# Patient Record
Sex: FEMALE | Race: BLACK OR AFRICAN AMERICAN | NOT HISPANIC OR LATINO | Employment: OTHER | URBAN - NONMETROPOLITAN AREA
[De-identification: names, ages, dates, MRNs, and addresses within clinical notes are randomized per-mention and may not be internally consistent; named-entity substitution may affect disease eponyms.]

---

## 2017-04-19 ENCOUNTER — OFFICE VISIT (OUTPATIENT)
Dept: ORTHOPEDIC SURGERY | Facility: CLINIC | Age: 67
End: 2017-04-19

## 2017-04-19 ENCOUNTER — PREP FOR SURGERY (OUTPATIENT)
Dept: ORTHOPEDIC SURGERY | Facility: CLINIC | Age: 67
End: 2017-04-19

## 2017-04-19 ENCOUNTER — APPOINTMENT (OUTPATIENT)
Dept: PREADMISSION TESTING | Facility: HOSPITAL | Age: 67
End: 2017-04-19

## 2017-04-19 VITALS
BODY MASS INDEX: 32.82 KG/M2 | DIASTOLIC BLOOD PRESSURE: 85 MMHG | SYSTOLIC BLOOD PRESSURE: 127 MMHG | HEART RATE: 86 BPM | WEIGHT: 197 LBS | HEIGHT: 65 IN

## 2017-04-19 DIAGNOSIS — S42.352A CLOSED DISPLACED COMMINUTED FRACTURE OF SHAFT OF LEFT HUMERUS, INITIAL ENCOUNTER: ICD-10-CM

## 2017-04-19 DIAGNOSIS — S42.352A CLOSED DISPLACED COMMINUTED FRACTURE OF SHAFT OF LEFT HUMERUS, INITIAL ENCOUNTER: Primary | ICD-10-CM

## 2017-04-19 PROBLEM — I10 HYPERTENSION: Status: ACTIVE | Noted: 2017-04-19

## 2017-04-19 PROBLEM — M19.90 ARTHRITIS: Status: ACTIVE | Noted: 2017-04-19

## 2017-04-19 PROBLEM — E11.9 DIABETES MELLITUS (HCC): Status: ACTIVE | Noted: 2017-04-19

## 2017-04-19 PROBLEM — M54.50 LOW BACK PAIN: Status: ACTIVE | Noted: 2017-04-19

## 2017-04-19 PROBLEM — E78.00 HIGH CHOLESTEROL: Status: ACTIVE | Noted: 2017-04-19

## 2017-04-19 LAB
ANION GAP SERPL CALCULATED.3IONS-SCNC: 6.8 MMOL/L (ref 3.6–11.2)
BASOPHILS # BLD AUTO: 0.05 10*3/MM3 (ref 0–0.3)
BASOPHILS NFR BLD AUTO: 0.6 % (ref 0–2)
BUN BLD-MCNC: 13 MG/DL (ref 7–21)
BUN/CREAT SERPL: 11.6 (ref 7–25)
CALCIUM SPEC-SCNC: 10.1 MG/DL (ref 7.7–10)
CHLORIDE SERPL-SCNC: 105 MMOL/L (ref 99–112)
CO2 SERPL-SCNC: 24.2 MMOL/L (ref 24.3–31.9)
CREAT BLD-MCNC: 1.12 MG/DL (ref 0.43–1.29)
DEPRECATED RDW RBC AUTO: 50.5 FL (ref 37–54)
EOSINOPHIL # BLD AUTO: 0.02 10*3/MM3 (ref 0–0.7)
EOSINOPHIL NFR BLD AUTO: 0.2 % (ref 0–7)
ERYTHROCYTE [DISTWIDTH] IN BLOOD BY AUTOMATED COUNT: 13.6 % (ref 11.5–14.5)
GFR SERPL CREATININE-BSD FRML MDRD: 59 ML/MIN/1.73
GLUCOSE BLD-MCNC: 150 MG/DL (ref 70–110)
HCT VFR BLD AUTO: 36 % (ref 37–47)
HGB BLD-MCNC: 11.3 G/DL (ref 12–16)
IMM GRANULOCYTES # BLD: 0.03 10*3/MM3 (ref 0–0.03)
IMM GRANULOCYTES NFR BLD: 0.4 % (ref 0–0.5)
LYMPHOCYTES # BLD AUTO: 1.23 10*3/MM3 (ref 1–3)
LYMPHOCYTES NFR BLD AUTO: 14.4 % (ref 16–46)
MCH RBC QN AUTO: 32.7 PG (ref 27–33)
MCHC RBC AUTO-ENTMCNC: 31.4 G/DL (ref 33–37)
MCV RBC AUTO: 104 FL (ref 80–94)
MONOCYTES # BLD AUTO: 0.82 10*3/MM3 (ref 0.1–0.9)
MONOCYTES NFR BLD AUTO: 9.6 % (ref 0–12)
NEUTROPHILS # BLD AUTO: 6.4 10*3/MM3 (ref 1.4–6.5)
NEUTROPHILS NFR BLD AUTO: 74.8 % (ref 40–75)
OSMOLALITY SERPL CALC.SUM OF ELEC: 274.9 MOSM/KG (ref 273–305)
PLATELET # BLD AUTO: 219 10*3/MM3 (ref 130–400)
PMV BLD AUTO: 10.8 FL (ref 6–10)
POTASSIUM BLD-SCNC: 4 MMOL/L (ref 3.5–5.3)
RBC # BLD AUTO: 3.46 10*6/MM3 (ref 4.2–5.4)
SODIUM BLD-SCNC: 136 MMOL/L (ref 135–153)
WBC NRBC COR # BLD: 8.55 10*3/MM3 (ref 4.5–12.5)

## 2017-04-19 PROCEDURE — 93005 ELECTROCARDIOGRAM TRACING: CPT | Performed by: ORTHOPAEDIC SURGERY

## 2017-04-19 PROCEDURE — 99203 OFFICE O/P NEW LOW 30 MIN: CPT | Performed by: ORTHOPAEDIC SURGERY

## 2017-04-19 PROCEDURE — 93010 ELECTROCARDIOGRAM REPORT: CPT | Performed by: INTERNAL MEDICINE

## 2017-04-19 PROCEDURE — 80048 BASIC METABOLIC PNL TOTAL CA: CPT

## 2017-04-19 PROCEDURE — 85025 COMPLETE CBC W/AUTO DIFF WBC: CPT

## 2017-04-19 PROCEDURE — 36415 COLL VENOUS BLD VENIPUNCTURE: CPT

## 2017-04-19 RX ORDER — SODIUM CHLORIDE 0.9 % (FLUSH) 0.9 %
1-10 SYRINGE (ML) INJECTION AS NEEDED
Status: CANCELLED | OUTPATIENT
Start: 2017-04-19

## 2017-04-19 RX ORDER — SODIUM CHLORIDE, SODIUM LACTATE, POTASSIUM CHLORIDE, CALCIUM CHLORIDE 600; 310; 30; 20 MG/100ML; MG/100ML; MG/100ML; MG/100ML
100 INJECTION, SOLUTION INTRAVENOUS CONTINUOUS
Status: CANCELLED | OUTPATIENT
Start: 2017-04-19

## 2017-04-19 NOTE — PROGRESS NOTES
History and Physical    Patient: Nancy Berman  YOB: 1950  Date of encounter: 04/19/2017      History of Present Illness:   Nancy Berman is a 67 y.o. female who is referred here today by [facility] emergency room for evaluation of acute injury to her left shoulder.  She states yesterday she fell at home landing on her left shoulder.  She denies any associated syncope, chest pain, or palpitations with the fall.  She states she simply lost her footing and fell.  Immediately after the fall she had significant pain and proceeded to the emergency room.  X-rays were taken while at ER then she was placed in a sling.  She denies paresthesias to the hand.    PMH:   Patient Active Problem List   Diagnosis   • Hypertension   • High cholesterol   • Diabetes mellitus   • Low back pain   • Arthritis     Past Medical History:   Diagnosis Date   • Arthritis    • Cancer     left Kidney   • Diabetes mellitus    • High cholesterol    • Hypertension    • Low back pain          PSH:  Past Surgical History:   Procedure Laterality Date   • BLADDER SURGERY     • HYSTERECTOMY     • KIDNEY SURGERY Left     Nephrectomy for cancerous mass       Allergies:     Allergies   Allergen Reactions   • Sulfa Antibiotics        Medications:     Current Outpatient Prescriptions:   •  acetaminophen (TYLENOL) 500 MG tablet, Take 500 mg by mouth every 6 (six) hours as needed for mild pain (1-3)., Disp: , Rfl:   •  amLODIPine (NORVASC) 10 MG tablet, Take 10 mg by mouth daily., Disp: , Rfl:   •  aspirin 325 MG tablet, Take 325 mg by mouth daily., Disp: , Rfl:   •  diclofenac (VOLTAREN) 1 % gel gel, Apply 4 g topically 4 (four) times a day as needed., Disp: , Rfl:   •  gabapentin (NEURONTIN) 600 MG tablet, Take 600 mg by mouth 3 (three) times a day., Disp: , Rfl:   •  lisinopril (PRINIVIL,ZESTRIL) 20 MG tablet, Take 20 mg by mouth daily., Disp: , Rfl:   •  metFORMIN XR (GLUCOPHAGE-XR) 500 MG 24 hr tablet, Take 500 mg by mouth daily  "with breakfast., Disp: , Rfl:   •  pioglitazone (ACTOS) 45 MG tablet, Take 45 mg by mouth daily., Disp: , Rfl:     Social History:     Social History     Occupational History   • Not on file.     Social History Main Topics   • Smoking status: Former Smoker   • Smokeless tobacco: Never Used   • Alcohol use Yes      Comment: social   • Drug use: Defer   • Sexual activity: Defer      Social History     Social History Narrative       Family History:     Family History   Problem Relation Age of Onset   • Parkinsonism Mother    • Heart disease Father    • Diabetes Father    • Cancer Brother        Review of Systems:   Review of Systems   Constitutional: Negative.    HENT: Negative.    Eyes: Negative.    Respiratory: Negative.    Cardiovascular: Negative.    Gastrointestinal: Negative.    Genitourinary: Negative.    Musculoskeletal:        Pertinent positives in HPI    Skin: Negative.    Neurological: Negative.    Hematological: Negative.    Psychiatric/Behavioral: Negative.        Physical Exam:   General Appearance:    67 y.o. female  cooperative, in no acute distress.  Alert and oriented x 3,                   Vitals:    04/19/17 0953   BP: 127/85   Pulse: 86   Weight: 197 lb (89.4 kg)   Height: 65\" (165.1 cm)          HEENT: Unremarkable      Neck: Supple without lymphadenopathy.      Chest: Clear to ascultation bilaterally. Normal respiratory effort.      Heart: Regular rate and rhythm. No murmurs noted.      Skin:  Warm and dry without any rash.      Musculoskeletal:     Upper Extremities: Examination reveals her left shoulder in a sling.  She does have moderate swelling and ecchymosis with tenderness along the proximal humeral shaft.  Her range of motion is not tested secondary to known fracture.  Her neurovascular      status is intact.    Lower Extremities: Unremarkable.       Radiology:     AP and lateral views of the shoulder and humerus were reviewed revealing a completely displaced comminuted proximal humeral " shaft fracture    Assessment    ICD-10-CM ICD-9-CM   1. Closed displaced comminuted fracture of shaft of left humerus, initial encounter S42.352A 812.21       Plan:  A 67-year-old female with acute injury to the left humerus.  X-rays were reviewed today revealing a comminuted displaced proximal humeral shaft fracture.  We discussed options including surgical intervention versus conservative treatment.  After a lengthy discussion with her and her daughter's she decided to proceed with surgical intervention with IM rodding of the left humerus.  We discussed the risks, benefits, and future outcomes of surgery.  She accepts these risks and is agreeable to surgery.  We will place her on the OR schedule for tomorrow April 20, 2017.    Written by, Miya LUCAS, acting as a scribe for Dr. Montse WINTER, Jose Willard MD, personally performed the services described in this documentation as scribed by the above named individual in my presence, and it is both accurate and complete.  4/19/2017  12:42 PM     Cc: ANGELA Clark    This document was signed by Jose Willard M.D.  April 19, 2017 12:43 PM

## 2017-04-19 NOTE — DISCHARGE INSTRUCTIONS
Call Dr. Willard's office for surgery arrival time -- surgery date is 4/20/17    TAKE the following medications the morning of surgery:  All heart or blood pressure medications    HOLD all diabetic medications the morning of surgery as ordered by physician.    Please discontinue all blood thinners and anticoagulants (except aspirin) prior to surgery as per your surgeon and cardiologist instructions.  Aspirin may be continued up to the day prior to surgery.     General Instructions:  · Do not eat or drink after midnight: includes water, mints, or gum. You may brush your teeth.  Dental appliances that are removable must be taken out day of surgery.  · Do not smoke, chew tobacco, or drink alcohol.  · Bring medications in original bottles, any inhalers and if applicable your C-PAP/BI-PAP machine.  · Bring any papers given to you in the doctor's office.  · Wear clean comfortable clothes and socks.  · Do not wear contact lenses or make-up. Bring a case for your glasses if applicable.  · Bring crutches or walker if applicable.  · Leave all other valuables and jewelry at home.    If you were given a blood bank ID arm band remember to bring it with you the day of surgery.    Preventing a Surgical Site Infection:  Shower on the morning of surgery using a fresh bar of anti-bacterial soap (such as Dial) and clean washcloth. Dry with a clean towel and dress in clean clothing.  For 2 to 3 days before surgery, avoid shaving with a razor near where you will have surgery because the razor can irritate skin and make it easier to develop an infection. Ask your surgeon if you will be receiving antibiotics prior to surgery.  Make sure you, your family, and all healthcare providers clear their hands with soap and water or an alcohol-based hand  before caring for you or your wound.  If at all possible, quit smoking as many days before surgery as you can.    Day of surgery:  Upon arrival, a Pre-op nurse and Anesthesiologist will  review your health history, obtain vital signs, and answer questions you may have. The only belongings needed at this time will be your home medications and if applicable your C-PAP/BI-PAP machine. If you are staying overnight your family can leave the rest of your belongings in the car and bring them to your room later. A Pre-op nurse will start an IV and you may receive medication in preparation for surgery, including something to help you relax. Your family will be able to see you in the Pre-op area. While you are in surgery your family should notify the waiting room  if they leave the waiting room area and provide a contact phone number.    Please be aware that surgery does come with discomfort. We want to make every effort to control your discomfort so please discuss any uncontrolled symptoms with your nurse. Your doctor will most likely have prescribed pain medications.    If you are going home after surgery you will receive individualized written care instructions before being discharged. A responsible adult must drive you to and from the hospital on the day of surgery and stay with you for 24 hours.    If you are staying overnight following surgery, you will be transported to your hospital room following the recovery period.  [facility] has all private rooms.    If you have any questions please call Pre-Admission Testing at [phone].  Deductibles and co-payments are collected on the day of service. Please be prepared to pay the required co-pay, deductible or deposit on the day of service as defined by your plan.

## 2017-04-19 NOTE — H&P
History and Physical    Patient: Nancy Berman  YOB: 1950  Date of encounter: 04/19/2017      History of Present Illness:   Nancy Berman is a 67 y.o. female who is referred here today by [facility] emergency room for evaluation of acute injury to her left shoulder.  She states yesterday she fell at home landing on her left shoulder.  She denies any associated syncope, chest pain, or palpitations with the fall.  She states she simply lost her footing and fell.  Immediately after the fall she had significant pain and proceeded to the emergency room.  X-rays were taken while at ER then she was placed in a sling.  She denies paresthesias to the hand.    PMH:   Patient Active Problem List   Diagnosis   • Hypertension   • High cholesterol   • Diabetes mellitus   • Low back pain   • Arthritis     Past Medical History:   Diagnosis Date   • Arthritis    • Cancer     left Kidney   • Diabetes mellitus    • High cholesterol    • Hypertension    • Low back pain          PSH:  Past Surgical History:   Procedure Laterality Date   • BLADDER SURGERY     • HYSTERECTOMY     • KIDNEY SURGERY Left     Nephrectomy for cancerous mass       Allergies:     Allergies   Allergen Reactions   • Sulfa Antibiotics        Medications:     Current Outpatient Prescriptions:   •  acetaminophen (TYLENOL) 500 MG tablet, Take 500 mg by mouth every 6 (six) hours as needed for mild pain (1-3)., Disp: , Rfl:   •  amLODIPine (NORVASC) 10 MG tablet, Take 10 mg by mouth daily., Disp: , Rfl:   •  aspirin 325 MG tablet, Take 325 mg by mouth daily., Disp: , Rfl:   •  diclofenac (VOLTAREN) 1 % gel gel, Apply 4 g topically 4 (four) times a day as needed., Disp: , Rfl:   •  gabapentin (NEURONTIN) 600 MG tablet, Take 600 mg by mouth 3 (three) times a day., Disp: , Rfl:   •  lisinopril (PRINIVIL,ZESTRIL) 20 MG tablet, Take 20 mg by mouth daily., Disp: , Rfl:   •  metFORMIN XR (GLUCOPHAGE-XR) 500 MG 24 hr tablet, Take 500 mg by mouth daily  "with breakfast., Disp: , Rfl:   •  pioglitazone (ACTOS) 45 MG tablet, Take 45 mg by mouth daily., Disp: , Rfl:     Social History:     Social History     Occupational History   • Not on file.     Social History Main Topics   • Smoking status: Former Smoker   • Smokeless tobacco: Never Used   • Alcohol use Yes      Comment: social   • Drug use: Defer   • Sexual activity: Defer      Social History     Social History Narrative       Family History:     Family History   Problem Relation Age of Onset   • Parkinsonism Mother    • Heart disease Father    • Diabetes Father    • Cancer Brother        Review of Systems:   Review of Systems   Constitutional: Negative.    HENT: Negative.    Eyes: Negative.    Respiratory: Negative.    Cardiovascular: Negative.    Gastrointestinal: Negative.    Genitourinary: Negative.    Musculoskeletal:        Pertinent positives in HPI    Skin: Negative.    Neurological: Negative.    Hematological: Negative.    Psychiatric/Behavioral: Negative.        Physical Exam:   General Appearance:    67 y.o. female  cooperative, in no acute distress.  Alert and oriented x 3,                   Vitals:    04/19/17 0953   BP: 127/85   Pulse: 86   Weight: 197 lb (89.4 kg)   Height: 65\" (165.1 cm)          HEENT: Unremarkable      Neck: Supple without lymphadenopathy.      Chest: Clear to ascultation bilaterally. Normal respiratory effort.      Heart: Regular rate and rhythm. No murmurs noted.      Skin:  Warm and dry without any rash.      Musculoskeletal:     Upper Extremities: Examination reveals her left shoulder in a sling.  She does have moderate swelling and ecchymosis with tenderness along the proximal humeral shaft.  Her range of motion is not tested secondary to known fracture.  Her neurovascular      status is intact.    Lower Extremities: Unremarkable.       Radiology:     AP and lateral views of the shoulder and humerus were reviewed revealing a completely displaced comminuted proximal humeral " shaft fracture    Assessment    ICD-10-CM ICD-9-CM   1. Closed displaced comminuted fracture of shaft of left humerus, initial encounter S42.352A 812.21       Plan:  A 67-year-old female with acute injury to the left humerus.  X-rays were reviewed today revealing a comminuted displaced proximal humeral shaft fracture.  We discussed options including surgical intervention versus conservative treatment.  After a lengthy discussion with her and her daughter's she decided to proceed with surgical intervention with IM rodding of the left humerus.  We discussed the risks, benefits, and future outcomes of surgery.  She accepts these risks and is agreeable to surgery.  We will place her on the OR schedule for tomorrow April 20, 2017.    Written by, Miya LUCAS, acting as a scribe for Dr. Montse WINTER, Jose Willard MD, personally performed the services described in this documentation as scribed by the above named individual in my presence, and it is both accurate and complete.  4/19/2017  12:42 PM     Cc: ANGELA Clark    This document was signed by Jose Willard M.D.  April 19, 2017 12:43 PM

## 2017-04-20 ENCOUNTER — ANESTHESIA (OUTPATIENT)
Dept: PERIOP | Facility: HOSPITAL | Age: 67
End: 2017-04-20

## 2017-04-20 ENCOUNTER — APPOINTMENT (OUTPATIENT)
Dept: GENERAL RADIOLOGY | Facility: HOSPITAL | Age: 67
End: 2017-04-20

## 2017-04-20 ENCOUNTER — HOSPITAL ENCOUNTER (OUTPATIENT)
Facility: HOSPITAL | Age: 67
Discharge: HOME | End: 2017-04-22
Attending: ORTHOPAEDIC SURGERY | Admitting: ORTHOPAEDIC SURGERY

## 2017-04-20 ENCOUNTER — ANESTHESIA EVENT (OUTPATIENT)
Dept: PERIOP | Facility: HOSPITAL | Age: 67
End: 2017-04-20

## 2017-04-20 DIAGNOSIS — S42.352A CLOSED DISPLACED COMMINUTED FRACTURE OF SHAFT OF LEFT HUMERUS, INITIAL ENCOUNTER: ICD-10-CM

## 2017-04-20 LAB
GLUCOSE BLDC GLUCOMTR-MCNC: 156 MG/DL (ref 70–130)
GLUCOSE BLDC GLUCOMTR-MCNC: 160 MG/DL (ref 70–130)

## 2017-04-20 PROCEDURE — 94799 UNLISTED PULMONARY SVC/PX: CPT

## 2017-04-20 PROCEDURE — 24516 TX HUMRL SHAFT FX IMED IMPLT: CPT | Performed by: ORTHOPAEDIC SURGERY

## 2017-04-20 PROCEDURE — 25010000002 FENTANYL CITRATE (PF) 100 MCG/2ML SOLUTION: Performed by: NURSE ANESTHETIST, CERTIFIED REGISTERED

## 2017-04-20 PROCEDURE — 76000 FLUOROSCOPY <1 HR PHYS/QHP: CPT | Performed by: RADIOLOGY

## 2017-04-20 PROCEDURE — G0378 HOSPITAL OBSERVATION PER HR: HCPCS

## 2017-04-20 PROCEDURE — 25010000002 HYDROMORPHONE PER 4 MG: Performed by: NURSE ANESTHETIST, CERTIFIED REGISTERED

## 2017-04-20 PROCEDURE — C1713 ANCHOR/SCREW BN/BN,TIS/BN: HCPCS | Performed by: ORTHOPAEDIC SURGERY

## 2017-04-20 PROCEDURE — 25010000003 CEFAZOLIN PER 500 MG: Performed by: ORTHOPAEDIC SURGERY

## 2017-04-20 PROCEDURE — 73060 X-RAY EXAM OF HUMERUS: CPT | Performed by: RADIOLOGY

## 2017-04-20 PROCEDURE — 25010000002 ONDANSETRON PER 1 MG: Performed by: NURSE ANESTHETIST, CERTIFIED REGISTERED

## 2017-04-20 PROCEDURE — 73060 X-RAY EXAM OF HUMERUS: CPT

## 2017-04-20 PROCEDURE — 25010000002 HYDROMORPHONE PER 4 MG: Performed by: ORTHOPAEDIC SURGERY

## 2017-04-20 PROCEDURE — 76000 FLUOROSCOPY <1 HR PHYS/QHP: CPT

## 2017-04-20 PROCEDURE — 25010000002 HYDROMORPHONE PER 4 MG: Performed by: ANESTHESIOLOGY

## 2017-04-20 PROCEDURE — 82962 GLUCOSE BLOOD TEST: CPT

## 2017-04-20 PROCEDURE — 25010000002 PROPOFOL 10 MG/ML EMULSION: Performed by: NURSE ANESTHETIST, CERTIFIED REGISTERED

## 2017-04-20 PROCEDURE — 25010000002 MIDAZOLAM PER 1 MG: Performed by: NURSE ANESTHETIST, CERTIFIED REGISTERED

## 2017-04-20 DEVICE — IMPLANTABLE DEVICE: Type: IMPLANTABLE DEVICE | Site: HUMERUS | Status: FUNCTIONAL

## 2017-04-20 DEVICE — SCRW LK STRDRV TI 4X30MM: Type: IMPLANTABLE DEVICE | Site: HUMERUS | Status: FUNCTIONAL

## 2017-04-20 RX ORDER — PROPOFOL 10 MG/ML
VIAL (ML) INTRAVENOUS AS NEEDED
Status: DISCONTINUED | OUTPATIENT
Start: 2017-04-20 | End: 2017-04-20 | Stop reason: SURG

## 2017-04-20 RX ORDER — PIOGLITAZONEHYDROCHLORIDE 15 MG/1
45 TABLET ORAL DAILY
Status: DISCONTINUED | OUTPATIENT
Start: 2017-04-20 | End: 2017-04-21

## 2017-04-20 RX ORDER — SODIUM CHLORIDE, SODIUM LACTATE, POTASSIUM CHLORIDE, CALCIUM CHLORIDE 600; 310; 30; 20 MG/100ML; MG/100ML; MG/100ML; MG/100ML
100 INJECTION, SOLUTION INTRAVENOUS CONTINUOUS
Status: DISCONTINUED | OUTPATIENT
Start: 2017-04-20 | End: 2017-04-22 | Stop reason: HOSPADM

## 2017-04-20 RX ORDER — SODIUM CHLORIDE 0.9 % (FLUSH) 0.9 %
1-10 SYRINGE (ML) INJECTION AS NEEDED
Status: DISCONTINUED | OUTPATIENT
Start: 2017-04-20 | End: 2017-04-20 | Stop reason: HOSPADM

## 2017-04-20 RX ORDER — HYDROCODONE BITARTRATE AND ACETAMINOPHEN 7.5; 325 MG/1; MG/1
1 TABLET ORAL EVERY 6 HOURS PRN
COMMUNITY
End: 2017-05-10 | Stop reason: SDUPTHER

## 2017-04-20 RX ORDER — SODIUM CHLORIDE 0.9 % (FLUSH) 0.9 %
1-10 SYRINGE (ML) INJECTION AS NEEDED
Status: DISCONTINUED | OUTPATIENT
Start: 2017-04-20 | End: 2017-04-22 | Stop reason: HOSPADM

## 2017-04-20 RX ORDER — MEPERIDINE HYDROCHLORIDE 25 MG/ML
12.5 INJECTION INTRAMUSCULAR; INTRAVENOUS; SUBCUTANEOUS
Status: DISCONTINUED | OUTPATIENT
Start: 2017-04-20 | End: 2017-04-20 | Stop reason: HOSPADM

## 2017-04-20 RX ORDER — HYDROCODONE BITARTRATE AND ACETAMINOPHEN 7.5; 325 MG/1; MG/1
1 TABLET ORAL EVERY 6 HOURS PRN
Status: DISCONTINUED | OUTPATIENT
Start: 2017-04-20 | End: 2017-04-21

## 2017-04-20 RX ORDER — ONDANSETRON 2 MG/ML
4 INJECTION INTRAMUSCULAR; INTRAVENOUS ONCE AS NEEDED
Status: DISCONTINUED | OUTPATIENT
Start: 2017-04-20 | End: 2017-04-20 | Stop reason: HOSPADM

## 2017-04-20 RX ORDER — IPRATROPIUM BROMIDE AND ALBUTEROL SULFATE 2.5; .5 MG/3ML; MG/3ML
3 SOLUTION RESPIRATORY (INHALATION) ONCE AS NEEDED
Status: DISCONTINUED | OUTPATIENT
Start: 2017-04-20 | End: 2017-04-20 | Stop reason: HOSPADM

## 2017-04-20 RX ORDER — FAMOTIDINE 10 MG/ML
INJECTION, SOLUTION INTRAVENOUS AS NEEDED
Status: DISCONTINUED | OUTPATIENT
Start: 2017-04-20 | End: 2017-04-20 | Stop reason: SURG

## 2017-04-20 RX ORDER — ASPIRIN 325 MG
325 TABLET ORAL DAILY
Status: DISCONTINUED | OUTPATIENT
Start: 2017-04-20 | End: 2017-04-22 | Stop reason: HOSPADM

## 2017-04-20 RX ORDER — ACETAMINOPHEN 325 MG/1
500 TABLET ORAL EVERY 6 HOURS PRN
Status: DISCONTINUED | OUTPATIENT
Start: 2017-04-20 | End: 2017-04-22 | Stop reason: HOSPADM

## 2017-04-20 RX ORDER — SODIUM CHLORIDE, SODIUM LACTATE, POTASSIUM CHLORIDE, CALCIUM CHLORIDE 600; 310; 30; 20 MG/100ML; MG/100ML; MG/100ML; MG/100ML
125 INJECTION, SOLUTION INTRAVENOUS CONTINUOUS
Status: DISCONTINUED | OUTPATIENT
Start: 2017-04-20 | End: 2017-04-21

## 2017-04-20 RX ORDER — NALOXONE HCL 0.4 MG/ML
0.4 VIAL (ML) INJECTION
Status: DISCONTINUED | OUTPATIENT
Start: 2017-04-20 | End: 2017-04-22 | Stop reason: HOSPADM

## 2017-04-20 RX ORDER — AMLODIPINE BESYLATE 10 MG/1
10 TABLET ORAL DAILY
Status: DISCONTINUED | OUTPATIENT
Start: 2017-04-20 | End: 2017-04-21

## 2017-04-20 RX ORDER — BUPIVACAINE HYDROCHLORIDE 5 MG/ML
INJECTION, SOLUTION PERINEURAL AS NEEDED
Status: DISCONTINUED | OUTPATIENT
Start: 2017-04-20 | End: 2017-04-20 | Stop reason: HOSPADM

## 2017-04-20 RX ORDER — ACETAMINOPHEN 325 MG/1
1000 TABLET ORAL ONCE
Status: DISCONTINUED | OUTPATIENT
Start: 2017-04-20 | End: 2017-04-20 | Stop reason: HOSPADM

## 2017-04-20 RX ORDER — FENTANYL CITRATE 50 UG/ML
INJECTION, SOLUTION INTRAMUSCULAR; INTRAVENOUS AS NEEDED
Status: DISCONTINUED | OUTPATIENT
Start: 2017-04-20 | End: 2017-04-20 | Stop reason: SURG

## 2017-04-20 RX ORDER — LIDOCAINE HYDROCHLORIDE 20 MG/ML
INJECTION, SOLUTION INFILTRATION; PERINEURAL AS NEEDED
Status: DISCONTINUED | OUTPATIENT
Start: 2017-04-20 | End: 2017-04-20 | Stop reason: SURG

## 2017-04-20 RX ORDER — DOCUSATE SODIUM 100 MG/1
100 CAPSULE, LIQUID FILLED ORAL 2 TIMES DAILY
Status: DISCONTINUED | OUTPATIENT
Start: 2017-04-20 | End: 2017-04-22 | Stop reason: HOSPADM

## 2017-04-20 RX ORDER — ONDANSETRON 2 MG/ML
INJECTION INTRAMUSCULAR; INTRAVENOUS AS NEEDED
Status: DISCONTINUED | OUTPATIENT
Start: 2017-04-20 | End: 2017-04-20 | Stop reason: SURG

## 2017-04-20 RX ORDER — FENTANYL CITRATE 50 UG/ML
50 INJECTION, SOLUTION INTRAMUSCULAR; INTRAVENOUS
Status: DISCONTINUED | OUTPATIENT
Start: 2017-04-20 | End: 2017-04-20 | Stop reason: HOSPADM

## 2017-04-20 RX ORDER — OXYCODONE HYDROCHLORIDE AND ACETAMINOPHEN 5; 325 MG/1; MG/1
1 TABLET ORAL EVERY 4 HOURS PRN
Status: DISCONTINUED | OUTPATIENT
Start: 2017-04-20 | End: 2017-04-20 | Stop reason: HOSPADM

## 2017-04-20 RX ORDER — HYDROMORPHONE HCL 110MG/55ML
PATIENT CONTROLLED ANALGESIA SYRINGE INTRAVENOUS AS NEEDED
Status: DISCONTINUED | OUTPATIENT
Start: 2017-04-20 | End: 2017-04-20 | Stop reason: SURG

## 2017-04-20 RX ORDER — MIDAZOLAM HYDROCHLORIDE 1 MG/ML
INJECTION INTRAMUSCULAR; INTRAVENOUS AS NEEDED
Status: DISCONTINUED | OUTPATIENT
Start: 2017-04-20 | End: 2017-04-20 | Stop reason: SURG

## 2017-04-20 RX ORDER — LISINOPRIL 10 MG/1
20 TABLET ORAL DAILY
Status: DISCONTINUED | OUTPATIENT
Start: 2017-04-20 | End: 2017-04-21

## 2017-04-20 RX ORDER — OXYCODONE HYDROCHLORIDE AND ACETAMINOPHEN 5; 325 MG/1; MG/1
1 TABLET ORAL ONCE AS NEEDED
Status: DISCONTINUED | OUTPATIENT
Start: 2017-04-20 | End: 2017-04-20 | Stop reason: HOSPADM

## 2017-04-20 RX ORDER — HYDROMORPHONE HYDROCHLORIDE 1 MG/ML
0.5 INJECTION, SOLUTION INTRAMUSCULAR; INTRAVENOUS; SUBCUTANEOUS EVERY 4 HOURS PRN
Status: DISCONTINUED | OUTPATIENT
Start: 2017-04-20 | End: 2017-04-22 | Stop reason: HOSPADM

## 2017-04-20 RX ADMIN — FENTANYL CITRATE 100 MCG: 50 INJECTION INTRAMUSCULAR; INTRAVENOUS at 12:55

## 2017-04-20 RX ADMIN — EPHEDRINE SULFATE 12.5 MG: 50 INJECTION INTRAMUSCULAR; INTRAVENOUS; SUBCUTANEOUS at 14:50

## 2017-04-20 RX ADMIN — SODIUM CHLORIDE, POTASSIUM CHLORIDE, SODIUM LACTATE AND CALCIUM CHLORIDE: 600; 310; 30; 20 INJECTION, SOLUTION INTRAVENOUS at 12:26

## 2017-04-20 RX ADMIN — HYDROMORPHONE HYDROCHLORIDE 1 MG: 1 INJECTION, SOLUTION INTRAMUSCULAR; INTRAVENOUS; SUBCUTANEOUS at 10:09

## 2017-04-20 RX ADMIN — EPHEDRINE SULFATE 25 MG: 50 INJECTION INTRAMUSCULAR; INTRAVENOUS; SUBCUTANEOUS at 12:41

## 2017-04-20 RX ADMIN — ASPIRIN 325 MG: 325 TABLET ORAL at 19:37

## 2017-04-20 RX ADMIN — AMLODIPINE BESYLATE 10 MG: 10 TABLET ORAL at 19:37

## 2017-04-20 RX ADMIN — LISINOPRIL 20 MG: 10 TABLET ORAL at 19:37

## 2017-04-20 RX ADMIN — FAMOTIDINE 20 MG: 10 INJECTION, SOLUTION INTRAVENOUS at 13:05

## 2017-04-20 RX ADMIN — HYDROMORPHONE HYDROCHLORIDE 0.5 MG: 1 INJECTION, SOLUTION INTRAMUSCULAR; INTRAVENOUS; SUBCUTANEOUS at 21:45

## 2017-04-20 RX ADMIN — MIDAZOLAM HYDROCHLORIDE 2 MG: 1 INJECTION, SOLUTION INTRAMUSCULAR; INTRAVENOUS at 12:25

## 2017-04-20 RX ADMIN — LIDOCAINE HYDROCHLORIDE 5 ML: 20 INJECTION, SOLUTION INFILTRATION; PERINEURAL at 12:33

## 2017-04-20 RX ADMIN — HYDROMORPHONE HYDROCHLORIDE 1 MG: 2 INJECTION, SOLUTION INTRAMUSCULAR; INTRAVENOUS; SUBCUTANEOUS at 13:25

## 2017-04-20 RX ADMIN — SODIUM CHLORIDE, POTASSIUM CHLORIDE, SODIUM LACTATE AND CALCIUM CHLORIDE 125 ML/HR: 600; 310; 30; 20 INJECTION, SOLUTION INTRAVENOUS at 10:08

## 2017-04-20 RX ADMIN — DOCUSATE SODIUM 100 MG: 100 CAPSULE, LIQUID FILLED ORAL at 19:37

## 2017-04-20 RX ADMIN — FENTANYL CITRATE 50 MCG: 50 INJECTION INTRAMUSCULAR; INTRAVENOUS at 16:08

## 2017-04-20 RX ADMIN — ONDANSETRON 4 MG: 2 INJECTION, SOLUTION INTRAMUSCULAR; INTRAVENOUS at 13:05

## 2017-04-20 RX ADMIN — SODIUM CHLORIDE, POTASSIUM CHLORIDE, SODIUM LACTATE AND CALCIUM CHLORIDE: 600; 310; 30; 20 INJECTION, SOLUTION INTRAVENOUS at 14:59

## 2017-04-20 RX ADMIN — OXYCODONE HYDROCHLORIDE AND ACETAMINOPHEN 1 TABLET: 5; 325 TABLET ORAL at 10:12

## 2017-04-20 RX ADMIN — FENTANYL CITRATE 100 MCG: 50 INJECTION INTRAMUSCULAR; INTRAVENOUS at 12:33

## 2017-04-20 RX ADMIN — HYDROMORPHONE HYDROCHLORIDE 1 MG: 2 INJECTION, SOLUTION INTRAMUSCULAR; INTRAVENOUS; SUBCUTANEOUS at 15:00

## 2017-04-20 RX ADMIN — PIOGLITAZONE 45 MG: 15 TABLET ORAL at 19:37

## 2017-04-20 RX ADMIN — HYDROCODONE BITARTRATE AND ACETAMINOPHEN 1 TABLET: 7.5; 325 TABLET ORAL at 19:06

## 2017-04-20 RX ADMIN — CEFAZOLIN SODIUM 2 G: 2 SOLUTION INTRAVENOUS at 12:25

## 2017-04-20 RX ADMIN — SODIUM CHLORIDE, POTASSIUM CHLORIDE, SODIUM LACTATE AND CALCIUM CHLORIDE 100 ML/HR: 600; 310; 30; 20 INJECTION, SOLUTION INTRAVENOUS at 23:23

## 2017-04-20 RX ADMIN — PROPOFOL 150 MG: 10 INJECTION, EMULSION INTRAVENOUS at 12:33

## 2017-04-20 NOTE — ANESTHESIA PREPROCEDURE EVALUATION
Anesthesia Evaluation     Patient summary reviewed and Nursing notes reviewed   no history of anesthetic complications:     Airway   Mallampati: II  TM distance: >3 FB  Neck ROM: full  no difficulty expected  Dental - normal exam     Pulmonary - negative pulmonary ROS and normal exam   Cardiovascular - normal exam  Exercise tolerance: good (4-7 METS)    NYHA Classification: II    (+) hypertension,       Neuro/Psych- negative ROS  GI/Hepatic/Renal/Endo    (+) obesity,  diabetes mellitus,     Musculoskeletal     (+) arthralgias,   Abdominal  - normal exam    Bowel sounds: normal.   Substance History - negative use     OB/GYN negative ob/gyn ROS         Other   (+) arthritis                                 Anesthesia Plan    ASA 3     general     intravenous induction   Anesthetic plan and risks discussed with patient.    Plan discussed with CRNA.

## 2017-04-20 NOTE — ANESTHESIA POSTPROCEDURE EVALUATION
Patient: Nancy Berman    Procedure Summary     Date Anesthesia Start Anesthesia Stop Room / Location    04/20/17 1226 1558 [facility] OR 03 / [facility]       Procedure Diagnosis Surgeon Provider    LEFT HUMERUS INTRAMEDULLARY NAIL/BHAVANA INSERTION (Left Arm Upper) Closed displaced comminuted fracture of shaft of left humerus, initial encounter  (Closed displaced comminuted fracture of shaft of left humerus, initial encounter [S42.352A]) MD Curt Hester,           Anesthesia Type: general  Last vitals  /78 (04/20/17 1628)    Temp 97.4 °F (36.3 °C) (04/20/17 1628)    Pulse 102 (04/20/17 1628)   Resp 16 (04/20/17 1628)    SpO2 99 % (04/20/17 1628)      Post Anesthesia Care and Evaluation    Patient location during evaluation: PHASE II  Patient participation: complete - patient participated  Level of consciousness: awake and alert  Pain score: 1  Pain management: adequate  Airway patency: patent  Anesthetic complications: No anesthetic complications  PONV Status: controlled  Cardiovascular status: acceptable  Respiratory status: acceptable  Hydration status: acceptable

## 2017-04-20 NOTE — ANESTHESIA PROCEDURE NOTES
Airway  Urgency: elective    Date/Time: 4/20/2017 12:34 PM  Airway not difficult    General Information and Staff    Patient location during procedure: OR  CRNA: LUANN MADDEN    Indications and Patient Condition  Indications for airway management: airway protection    Preoxygenated: yes  MILS maintained throughout  Mask difficulty assessment: 0 - not attempted    Final Airway Details  Final airway type: supraglottic airway      Successful airway: unique  Size 4    Number of attempts at approach: 1

## 2017-04-21 ENCOUNTER — APPOINTMENT (OUTPATIENT)
Dept: CT IMAGING | Facility: HOSPITAL | Age: 67
End: 2017-04-21

## 2017-04-21 LAB
ALBUMIN SERPL-MCNC: 4.2 G/DL (ref 3.4–4.8)
ALBUMIN/GLOB SERPL: 1.4 G/DL (ref 1.5–2.5)
ALP SERPL-CCNC: 92 U/L (ref 35–104)
ALT SERPL W P-5'-P-CCNC: 13 U/L (ref 10–36)
ANION GAP SERPL CALCULATED.3IONS-SCNC: 9.7 MMOL/L (ref 3.6–11.2)
AST SERPL-CCNC: 34 U/L (ref 10–30)
BASOPHILS # BLD AUTO: 0.02 10*3/MM3 (ref 0–0.3)
BASOPHILS NFR BLD AUTO: 0.2 % (ref 0–2)
BILIRUB SERPL-MCNC: 0.8 MG/DL (ref 0.2–1.8)
BUN BLD-MCNC: 11 MG/DL (ref 7–21)
BUN/CREAT SERPL: 8.5 (ref 7–25)
CALCIUM SPEC-SCNC: 9.1 MG/DL (ref 7.7–10)
CHLORIDE SERPL-SCNC: 101 MMOL/L (ref 99–112)
CO2 SERPL-SCNC: 20.3 MMOL/L (ref 24.3–31.9)
CORTIS SERPL-MCNC: 28.5 MCG/DL
CREAT BLD-MCNC: 1.29 MG/DL (ref 0.43–1.29)
DEPRECATED RDW RBC AUTO: 50.3 FL (ref 37–54)
EOSINOPHIL # BLD AUTO: 0.02 10*3/MM3 (ref 0–0.7)
EOSINOPHIL NFR BLD AUTO: 0.2 % (ref 0–7)
ERYTHROCYTE [DISTWIDTH] IN BLOOD BY AUTOMATED COUNT: 13.5 % (ref 11.5–14.5)
FOLATE SERPL-MCNC: 12.75 NG/ML (ref 5.4–20)
GFR SERPL CREATININE-BSD FRML MDRD: 50 ML/MIN/1.73
GLOBULIN UR ELPH-MCNC: 3.1 GM/DL
GLUCOSE BLD-MCNC: 149 MG/DL (ref 70–110)
GLUCOSE BLDC GLUCOMTR-MCNC: 129 MG/DL (ref 70–130)
GLUCOSE BLDC GLUCOMTR-MCNC: 134 MG/DL (ref 70–130)
GLUCOSE BLDC GLUCOMTR-MCNC: 137 MG/DL (ref 70–130)
GLUCOSE BLDC GLUCOMTR-MCNC: 149 MG/DL (ref 70–130)
HBA1C MFR BLD: 6 % (ref 4.5–5.7)
HCT VFR BLD AUTO: 26.9 % (ref 37–47)
HCT VFR BLD AUTO: 29.4 % (ref 37–47)
HGB BLD-MCNC: 8.6 G/DL (ref 12–16)
HGB BLD-MCNC: 9.5 G/DL (ref 12–16)
IMM GRANULOCYTES # BLD: 0.02 10*3/MM3 (ref 0–0.03)
IMM GRANULOCYTES NFR BLD: 0.2 % (ref 0–0.5)
LYMPHOCYTES # BLD AUTO: 1.84 10*3/MM3 (ref 1–3)
LYMPHOCYTES NFR BLD AUTO: 19 % (ref 16–46)
MCH RBC QN AUTO: 32.7 PG (ref 27–33)
MCHC RBC AUTO-ENTMCNC: 32 G/DL (ref 33–37)
MCV RBC AUTO: 102.3 FL (ref 80–94)
MONOCYTES # BLD AUTO: 1.3 10*3/MM3 (ref 0.1–0.9)
MONOCYTES NFR BLD AUTO: 13.4 % (ref 0–12)
NEUTROPHILS # BLD AUTO: 6.5 10*3/MM3 (ref 1.4–6.5)
NEUTROPHILS NFR BLD AUTO: 67 % (ref 40–75)
OSMOLALITY SERPL CALC.SUM OF ELEC: 264.9 MOSM/KG (ref 273–305)
PLATELET # BLD AUTO: 213 10*3/MM3 (ref 130–400)
PMV BLD AUTO: 10.8 FL (ref 6–10)
POTASSIUM BLD-SCNC: 3.7 MMOL/L (ref 3.5–5.3)
PROT SERPL-MCNC: 7.3 G/DL (ref 6–8)
RBC # BLD AUTO: 2.63 10*6/MM3 (ref 4.2–5.4)
SODIUM BLD-SCNC: 131 MMOL/L (ref 135–153)
VIT B12 BLD-MCNC: 255 PG/ML (ref 211–911)
WBC NRBC COR # BLD: 9.7 10*3/MM3 (ref 4.5–12.5)

## 2017-04-21 PROCEDURE — 93005 ELECTROCARDIOGRAM TRACING: CPT | Performed by: PHYSICIAN ASSISTANT

## 2017-04-21 PROCEDURE — 82962 GLUCOSE BLOOD TEST: CPT

## 2017-04-21 PROCEDURE — 85025 COMPLETE CBC W/AUTO DIFF WBC: CPT | Performed by: ORTHOPAEDIC SURGERY

## 2017-04-21 PROCEDURE — 80053 COMPREHEN METABOLIC PANEL: CPT | Performed by: PHYSICIAN ASSISTANT

## 2017-04-21 PROCEDURE — 70450 CT HEAD/BRAIN W/O DYE: CPT

## 2017-04-21 PROCEDURE — 82746 ASSAY OF FOLIC ACID SERUM: CPT | Performed by: PHYSICIAN ASSISTANT

## 2017-04-21 PROCEDURE — 70450 CT HEAD/BRAIN W/O DYE: CPT | Performed by: RADIOLOGY

## 2017-04-21 PROCEDURE — 82533 TOTAL CORTISOL: CPT | Performed by: INTERNAL MEDICINE

## 2017-04-21 PROCEDURE — 83036 HEMOGLOBIN GLYCOSYLATED A1C: CPT | Performed by: PHYSICIAN ASSISTANT

## 2017-04-21 PROCEDURE — G0378 HOSPITAL OBSERVATION PER HR: HCPCS

## 2017-04-21 PROCEDURE — 93010 ELECTROCARDIOGRAM REPORT: CPT | Performed by: INTERNAL MEDICINE

## 2017-04-21 PROCEDURE — 85018 HEMOGLOBIN: CPT | Performed by: INTERNAL MEDICINE

## 2017-04-21 PROCEDURE — 99024 POSTOP FOLLOW-UP VISIT: CPT | Performed by: PHYSICIAN ASSISTANT

## 2017-04-21 PROCEDURE — 99220 PR INITIAL OBSERVATION CARE/DAY 70 MINUTES: CPT | Performed by: INTERNAL MEDICINE

## 2017-04-21 PROCEDURE — 85014 HEMATOCRIT: CPT | Performed by: INTERNAL MEDICINE

## 2017-04-21 PROCEDURE — 82607 VITAMIN B-12: CPT | Performed by: PHYSICIAN ASSISTANT

## 2017-04-21 RX ORDER — NYSTATIN 100000 [USP'U]/G
POWDER TOPICAL EVERY 12 HOURS SCHEDULED
Status: DISCONTINUED | OUTPATIENT
Start: 2017-04-21 | End: 2017-04-22 | Stop reason: HOSPADM

## 2017-04-21 RX ORDER — DEXTROSE MONOHYDRATE 25 G/50ML
25 INJECTION, SOLUTION INTRAVENOUS
Status: DISCONTINUED | OUTPATIENT
Start: 2017-04-21 | End: 2017-04-22 | Stop reason: HOSPADM

## 2017-04-21 RX ORDER — NICOTINE POLACRILEX 4 MG
15 LOZENGE BUCCAL
Status: DISCONTINUED | OUTPATIENT
Start: 2017-04-21 | End: 2017-04-22 | Stop reason: HOSPADM

## 2017-04-21 RX ORDER — HYDROCODONE BITARTRATE AND ACETAMINOPHEN 7.5; 325 MG/1; MG/1
1 TABLET ORAL EVERY 4 HOURS PRN
Status: DISCONTINUED | OUTPATIENT
Start: 2017-04-21 | End: 2017-04-22 | Stop reason: HOSPADM

## 2017-04-21 RX ADMIN — SODIUM CHLORIDE 250 ML: 9 INJECTION, SOLUTION INTRAVENOUS at 17:37

## 2017-04-21 RX ADMIN — NYSTATIN: 100000 POWDER TOPICAL at 21:48

## 2017-04-21 RX ADMIN — NYSTATIN: 100000 POWDER TOPICAL at 13:31

## 2017-04-21 RX ADMIN — DOCUSATE SODIUM 100 MG: 100 CAPSULE, LIQUID FILLED ORAL at 09:07

## 2017-04-21 RX ADMIN — SODIUM CHLORIDE, POTASSIUM CHLORIDE, SODIUM LACTATE AND CALCIUM CHLORIDE 100 ML/HR: 600; 310; 30; 20 INJECTION, SOLUTION INTRAVENOUS at 21:48

## 2017-04-21 RX ADMIN — ACETAMINOPHEN 487.5 MG: 325 TABLET ORAL at 11:19

## 2017-04-21 RX ADMIN — HYDROCODONE BITARTRATE AND ACETAMINOPHEN 1 TABLET: 7.5; 325 TABLET ORAL at 13:28

## 2017-04-21 RX ADMIN — HYDROCODONE BITARTRATE AND ACETAMINOPHEN 1 TABLET: 7.5; 325 TABLET ORAL at 01:34

## 2017-04-21 RX ADMIN — PIOGLITAZONE 45 MG: 15 TABLET ORAL at 09:07

## 2017-04-21 RX ADMIN — HYDROCODONE BITARTRATE AND ACETAMINOPHEN 1 TABLET: 7.5; 325 TABLET ORAL at 07:39

## 2017-04-21 RX ADMIN — SODIUM CHLORIDE, POTASSIUM CHLORIDE, SODIUM LACTATE AND CALCIUM CHLORIDE 100 ML/HR: 600; 310; 30; 20 INJECTION, SOLUTION INTRAVENOUS at 18:40

## 2017-04-21 RX ADMIN — AMLODIPINE BESYLATE 10 MG: 10 TABLET ORAL at 09:07

## 2017-04-21 RX ADMIN — SODIUM CHLORIDE, POTASSIUM CHLORIDE, SODIUM LACTATE AND CALCIUM CHLORIDE 100 ML/HR: 600; 310; 30; 20 INJECTION, SOLUTION INTRAVENOUS at 11:07

## 2017-04-21 RX ADMIN — HYDROCODONE BITARTRATE AND ACETAMINOPHEN 1 TABLET: 7.5; 325 TABLET ORAL at 09:07

## 2017-04-21 RX ADMIN — DOCUSATE SODIUM 100 MG: 100 CAPSULE, LIQUID FILLED ORAL at 17:33

## 2017-04-21 RX ADMIN — LISINOPRIL 20 MG: 10 TABLET ORAL at 09:07

## 2017-04-21 RX ADMIN — ASPIRIN 325 MG: 325 TABLET ORAL at 09:07

## 2017-04-22 VITALS
OXYGEN SATURATION: 97 % | WEIGHT: 197 LBS | TEMPERATURE: 97.9 F | HEIGHT: 66 IN | RESPIRATION RATE: 18 BRPM | SYSTOLIC BLOOD PRESSURE: 120 MMHG | BODY MASS INDEX: 31.66 KG/M2 | DIASTOLIC BLOOD PRESSURE: 62 MMHG | HEART RATE: 110 BPM

## 2017-04-22 LAB
ALBUMIN SERPL-MCNC: 3.9 G/DL (ref 3.4–4.8)
ALBUMIN/GLOB SERPL: 1.3 G/DL (ref 1.5–2.5)
ALP SERPL-CCNC: 86 U/L (ref 35–104)
ALT SERPL W P-5'-P-CCNC: 16 U/L (ref 10–36)
ANION GAP SERPL CALCULATED.3IONS-SCNC: 6.7 MMOL/L (ref 3.6–11.2)
AST SERPL-CCNC: 48 U/L (ref 10–30)
BASOPHILS # BLD AUTO: 0.02 10*3/MM3 (ref 0–0.3)
BASOPHILS NFR BLD AUTO: 0.2 % (ref 0–2)
BILIRUB SERPL-MCNC: 0.7 MG/DL (ref 0.2–1.8)
BUN BLD-MCNC: 12 MG/DL (ref 7–21)
BUN/CREAT SERPL: 11.4 (ref 7–25)
CALCIUM SPEC-SCNC: 8.9 MG/DL (ref 7.7–10)
CHLORIDE SERPL-SCNC: 103 MMOL/L (ref 99–112)
CO2 SERPL-SCNC: 24.3 MMOL/L (ref 24.3–31.9)
CREAT BLD-MCNC: 1.05 MG/DL (ref 0.43–1.29)
DEPRECATED RDW RBC AUTO: 51.2 FL (ref 37–54)
EOSINOPHIL # BLD AUTO: 0.04 10*3/MM3 (ref 0–0.7)
EOSINOPHIL NFR BLD AUTO: 0.5 % (ref 0–7)
ERYTHROCYTE [DISTWIDTH] IN BLOOD BY AUTOMATED COUNT: 13.5 % (ref 11.5–14.5)
FERRITIN SERPL-MCNC: 541 NG/ML (ref 10–290.3)
FOLATE SERPL-MCNC: 10.62 NG/ML (ref 5.4–20)
GFR SERPL CREATININE-BSD FRML MDRD: 64 ML/MIN/1.73
GLOBULIN UR ELPH-MCNC: 2.9 GM/DL
GLUCOSE BLD-MCNC: 147 MG/DL (ref 70–110)
GLUCOSE BLDC GLUCOMTR-MCNC: 140 MG/DL (ref 70–130)
GLUCOSE BLDC GLUCOMTR-MCNC: 183 MG/DL (ref 70–130)
HCT VFR BLD AUTO: 26 % (ref 37–47)
HGB BLD-MCNC: 8.4 G/DL (ref 12–16)
IMM GRANULOCYTES # BLD: 0.02 10*3/MM3 (ref 0–0.03)
IMM GRANULOCYTES NFR BLD: 0.2 % (ref 0–0.5)
IRON 24H UR-MRATE: 7 MCG/DL (ref 49–151)
IRON SATN MFR SERPL: 4 % (ref 15–50)
LYMPHOCYTES # BLD AUTO: 1.15 10*3/MM3 (ref 1–3)
LYMPHOCYTES NFR BLD AUTO: 13.6 % (ref 16–46)
MCH RBC QN AUTO: 33.2 PG (ref 27–33)
MCHC RBC AUTO-ENTMCNC: 32.3 G/DL (ref 33–37)
MCV RBC AUTO: 102.8 FL (ref 80–94)
MONOCYTES # BLD AUTO: 1.16 10*3/MM3 (ref 0.1–0.9)
MONOCYTES NFR BLD AUTO: 13.7 % (ref 0–12)
NEUTROPHILS # BLD AUTO: 6.05 10*3/MM3 (ref 1.4–6.5)
NEUTROPHILS NFR BLD AUTO: 71.8 % (ref 40–75)
OSMOLALITY SERPL CALC.SUM OF ELEC: 270.7 MOSM/KG (ref 273–305)
PLATELET # BLD AUTO: 221 10*3/MM3 (ref 130–400)
PMV BLD AUTO: 10.9 FL (ref 6–10)
POTASSIUM BLD-SCNC: 3.8 MMOL/L (ref 3.5–5.3)
PROT SERPL-MCNC: 6.8 G/DL (ref 6–8)
RBC # BLD AUTO: 2.53 10*6/MM3 (ref 4.2–5.4)
RETICS #: 0.06 10*6/MM3 (ref 0.02–0.13)
RETICS/RBC NFR AUTO: 2.28 % (ref 0.5–2)
SODIUM BLD-SCNC: 134 MMOL/L (ref 135–153)
TIBC SERPL-MCNC: 196 MCG/DL (ref 241–421)
VIT B12 BLD-MCNC: 214 PG/ML (ref 211–911)
WBC NRBC COR # BLD: 8.44 10*3/MM3 (ref 4.5–12.5)

## 2017-04-22 PROCEDURE — 82746 ASSAY OF FOLIC ACID SERUM: CPT | Performed by: INTERNAL MEDICINE

## 2017-04-22 PROCEDURE — G0378 HOSPITAL OBSERVATION PER HR: HCPCS

## 2017-04-22 PROCEDURE — 82962 GLUCOSE BLOOD TEST: CPT

## 2017-04-22 PROCEDURE — 85045 AUTOMATED RETICULOCYTE COUNT: CPT | Performed by: INTERNAL MEDICINE

## 2017-04-22 PROCEDURE — 80053 COMPREHEN METABOLIC PANEL: CPT | Performed by: PHYSICIAN ASSISTANT

## 2017-04-22 PROCEDURE — 83550 IRON BINDING TEST: CPT | Performed by: INTERNAL MEDICINE

## 2017-04-22 PROCEDURE — 82728 ASSAY OF FERRITIN: CPT | Performed by: INTERNAL MEDICINE

## 2017-04-22 PROCEDURE — 25010000002 CYANOCOBALAMIN PER 1000 MCG: Performed by: INTERNAL MEDICINE

## 2017-04-22 PROCEDURE — 85025 COMPLETE CBC W/AUTO DIFF WBC: CPT | Performed by: PHYSICIAN ASSISTANT

## 2017-04-22 PROCEDURE — 99225 PR SBSQ OBSERVATION CARE/DAY 25 MINUTES: CPT | Performed by: INTERNAL MEDICINE

## 2017-04-22 PROCEDURE — 83540 ASSAY OF IRON: CPT | Performed by: INTERNAL MEDICINE

## 2017-04-22 PROCEDURE — 82607 VITAMIN B-12: CPT | Performed by: INTERNAL MEDICINE

## 2017-04-22 PROCEDURE — 99024 POSTOP FOLLOW-UP VISIT: CPT | Performed by: PHYSICIAN ASSISTANT

## 2017-04-22 RX ORDER — CYANOCOBALAMIN 1000 UG/ML
1000 INJECTION, SOLUTION INTRAMUSCULAR; SUBCUTANEOUS ONCE
Status: COMPLETED | OUTPATIENT
Start: 2017-04-22 | End: 2017-04-22

## 2017-04-22 RX ADMIN — SODIUM CHLORIDE, POTASSIUM CHLORIDE, SODIUM LACTATE AND CALCIUM CHLORIDE 100 ML/HR: 600; 310; 30; 20 INJECTION, SOLUTION INTRAVENOUS at 05:15

## 2017-04-22 RX ADMIN — CYANOCOBALAMIN 1000 MCG: 1000 INJECTION, SOLUTION INTRAMUSCULAR at 12:35

## 2017-04-22 RX ADMIN — ASPIRIN 325 MG: 325 TABLET ORAL at 09:31

## 2017-04-22 RX ADMIN — HYDROCODONE BITARTRATE AND ACETAMINOPHEN 1 TABLET: 7.5; 325 TABLET ORAL at 05:15

## 2017-04-22 RX ADMIN — DOCUSATE SODIUM 100 MG: 100 CAPSULE, LIQUID FILLED ORAL at 09:31

## 2017-04-22 RX ADMIN — NYSTATIN: 100000 POWDER TOPICAL at 09:31

## 2017-04-22 RX ADMIN — HYDROCODONE BITARTRATE AND ACETAMINOPHEN 1 TABLET: 7.5; 325 TABLET ORAL at 09:31

## 2017-04-25 ENCOUNTER — TELEPHONE (OUTPATIENT)
Dept: ORTHOPEDIC SURGERY | Facility: CLINIC | Age: 67
End: 2017-04-25

## 2017-04-25 NOTE — TELEPHONE ENCOUNTER
Patient called stating she in severe pain, the medication given to her after surgery is not helping, she was given Norco 7.5/325 1 po q6h. Asked if using ice to help with the pain patient stated yes. Patient has been taking the Norco 7.5/325 1 po q4h.  She was a PROCEDURE PERFORMED: Intramedullary rodding locked proximally, left humeral shaft fracture.on 04/20/17.  EVY report        Spoke Dr. Murphy, he instructed to take the Norco 2 po q4-6 hours and to use ice.   Notified patient at phone number [phone], she verbalized understanding.

## 2017-04-26 DIAGNOSIS — S42.352A CLOSED DISPLACED COMMINUTED FRACTURE OF SHAFT OF LEFT HUMERUS, INITIAL ENCOUNTER: Primary | ICD-10-CM

## 2017-05-01 ENCOUNTER — OFFICE VISIT (OUTPATIENT)
Dept: ORTHOPEDIC SURGERY | Facility: CLINIC | Age: 67
End: 2017-05-01

## 2017-05-01 ENCOUNTER — HOSPITAL ENCOUNTER (OUTPATIENT)
Dept: GENERAL RADIOLOGY | Facility: HOSPITAL | Age: 67
Discharge: HOME | End: 2017-05-01
Attending: ORTHOPAEDIC SURGERY | Admitting: ORTHOPAEDIC SURGERY

## 2017-05-01 VITALS — WEIGHT: 197 LBS | HEIGHT: 66 IN | BODY MASS INDEX: 31.66 KG/M2

## 2017-05-01 DIAGNOSIS — S42.352D CLOSED DISPLACED COMMINUTED FRACTURE OF SHAFT OF LEFT HUMERUS WITH ROUTINE HEALING, SUBSEQUENT ENCOUNTER: Primary | ICD-10-CM

## 2017-05-01 DIAGNOSIS — S42.352A CLOSED DISPLACED COMMINUTED FRACTURE OF SHAFT OF LEFT HUMERUS, INITIAL ENCOUNTER: ICD-10-CM

## 2017-05-01 PROCEDURE — 99024 POSTOP FOLLOW-UP VISIT: CPT | Performed by: ORTHOPAEDIC SURGERY

## 2017-05-01 PROCEDURE — 73060 X-RAY EXAM OF HUMERUS: CPT

## 2017-05-01 PROCEDURE — 73060 X-RAY EXAM OF HUMERUS: CPT | Performed by: RADIOLOGY

## 2017-05-09 ENCOUNTER — TELEPHONE (OUTPATIENT)
Dept: ORTHOPEDIC SURGERY | Facility: CLINIC | Age: 67
End: 2017-05-09

## 2017-05-10 ENCOUNTER — OFFICE VISIT (OUTPATIENT)
Dept: ORTHOPEDIC SURGERY | Facility: CLINIC | Age: 67
End: 2017-05-10

## 2017-05-10 ENCOUNTER — HOSPITAL ENCOUNTER (OUTPATIENT)
Dept: GENERAL RADIOLOGY | Facility: HOSPITAL | Age: 67
Discharge: HOME | End: 2017-05-10
Attending: ORTHOPAEDIC SURGERY | Admitting: ORTHOPAEDIC SURGERY

## 2017-05-10 VITALS — WEIGHT: 197 LBS | BODY MASS INDEX: 31.66 KG/M2 | HEIGHT: 66 IN

## 2017-05-10 DIAGNOSIS — S42.215D CLOSED NONDISPLACED FRACTURE OF SURGICAL NECK OF LEFT HUMERUS WITH ROUTINE HEALING, UNSPECIFIED FRACTURE MORPHOLOGY, SUBSEQUENT ENCOUNTER: Primary | ICD-10-CM

## 2017-05-10 DIAGNOSIS — S42.352D CLOSED DISPLACED COMMINUTED FRACTURE OF SHAFT OF LEFT HUMERUS WITH ROUTINE HEALING, SUBSEQUENT ENCOUNTER: Primary | ICD-10-CM

## 2017-05-10 PROCEDURE — 73060 X-RAY EXAM OF HUMERUS: CPT | Performed by: RADIOLOGY

## 2017-05-10 PROCEDURE — 99024 POSTOP FOLLOW-UP VISIT: CPT | Performed by: ORTHOPAEDIC SURGERY

## 2017-05-10 PROCEDURE — 73060 X-RAY EXAM OF HUMERUS: CPT

## 2017-05-10 RX ORDER — HYDROCODONE BITARTRATE AND ACETAMINOPHEN 7.5; 325 MG/1; MG/1
1 TABLET ORAL EVERY 6 HOURS PRN
Qty: 30 TABLET | Refills: 0
Start: 2017-04-27 | End: 2017-06-21

## 2017-05-23 RX ORDER — HYDROCODONE BITARTRATE AND ACETAMINOPHEN 7.5; 325 MG/1; MG/1
1 TABLET ORAL EVERY 6 HOURS PRN
Qty: 60 TABLET | Refills: 0 | Status: CANCELLED
Start: 2017-05-23

## 2017-05-25 DIAGNOSIS — S42.352D CLOSED DISPLACED COMMINUTED FRACTURE OF SHAFT OF LEFT HUMERUS WITH ROUTINE HEALING, SUBSEQUENT ENCOUNTER: Primary | ICD-10-CM

## 2017-05-31 ENCOUNTER — OFFICE VISIT (OUTPATIENT)
Dept: ORTHOPEDIC SURGERY | Facility: CLINIC | Age: 67
End: 2017-05-31

## 2017-05-31 ENCOUNTER — HOSPITAL ENCOUNTER (OUTPATIENT)
Dept: GENERAL RADIOLOGY | Facility: HOSPITAL | Age: 67
Discharge: HOME | End: 2017-05-31
Attending: ORTHOPAEDIC SURGERY | Admitting: ORTHOPAEDIC SURGERY

## 2017-05-31 VITALS — HEIGHT: 66 IN | BODY MASS INDEX: 31.66 KG/M2 | WEIGHT: 197 LBS

## 2017-05-31 DIAGNOSIS — S42.352D CLOSED DISPLACED COMMINUTED FRACTURE OF SHAFT OF LEFT HUMERUS WITH ROUTINE HEALING, SUBSEQUENT ENCOUNTER: ICD-10-CM

## 2017-05-31 DIAGNOSIS — S42.352D CLOSED DISPLACED COMMINUTED FRACTURE OF SHAFT OF LEFT HUMERUS WITH ROUTINE HEALING, SUBSEQUENT ENCOUNTER: Primary | ICD-10-CM

## 2017-05-31 PROCEDURE — 73060 X-RAY EXAM OF HUMERUS: CPT

## 2017-05-31 PROCEDURE — 73060 X-RAY EXAM OF HUMERUS: CPT | Performed by: RADIOLOGY

## 2017-05-31 PROCEDURE — 99024 POSTOP FOLLOW-UP VISIT: CPT | Performed by: ORTHOPAEDIC SURGERY

## 2017-06-05 RX ORDER — HYDROCODONE BITARTRATE AND ACETAMINOPHEN 7.5; 325 MG/1; MG/1
1 TABLET ORAL EVERY 6 HOURS PRN
Qty: 40 TABLET | Refills: 0
Start: 2017-06-05 | End: 2017-06-21 | Stop reason: SDUPTHER

## 2017-06-14 DIAGNOSIS — S42.352D CLOSED DISPLACED COMMINUTED FRACTURE OF SHAFT OF LEFT HUMERUS WITH ROUTINE HEALING, SUBSEQUENT ENCOUNTER: Primary | ICD-10-CM

## 2017-06-21 ENCOUNTER — HOSPITAL ENCOUNTER (OUTPATIENT)
Dept: GENERAL RADIOLOGY | Facility: HOSPITAL | Age: 67
Discharge: HOME | End: 2017-06-21
Attending: ORTHOPAEDIC SURGERY | Admitting: ORTHOPAEDIC SURGERY

## 2017-06-21 ENCOUNTER — OFFICE VISIT (OUTPATIENT)
Dept: ORTHOPEDIC SURGERY | Facility: CLINIC | Age: 67
End: 2017-06-21

## 2017-06-21 VITALS — HEIGHT: 66 IN | WEIGHT: 197 LBS | BODY MASS INDEX: 31.66 KG/M2

## 2017-06-21 DIAGNOSIS — S42.352D CLOSED DISPLACED COMMINUTED FRACTURE OF SHAFT OF LEFT HUMERUS WITH ROUTINE HEALING, SUBSEQUENT ENCOUNTER: Primary | ICD-10-CM

## 2017-06-21 DIAGNOSIS — S42.352D CLOSED DISPLACED COMMINUTED FRACTURE OF SHAFT OF LEFT HUMERUS WITH ROUTINE HEALING, SUBSEQUENT ENCOUNTER: ICD-10-CM

## 2017-06-21 PROCEDURE — 73060 X-RAY EXAM OF HUMERUS: CPT

## 2017-06-21 PROCEDURE — 99024 POSTOP FOLLOW-UP VISIT: CPT | Performed by: ORTHOPAEDIC SURGERY

## 2017-06-21 PROCEDURE — 73060 X-RAY EXAM OF HUMERUS: CPT | Performed by: RADIOLOGY

## 2017-06-21 RX ORDER — HYDROCODONE BITARTRATE AND ACETAMINOPHEN 7.5; 325 MG/1; MG/1
1 TABLET ORAL EVERY 6 HOURS PRN
Qty: 40 TABLET | Refills: 0
Start: 2017-06-21 | End: 2017-11-27 | Stop reason: SDUPTHER

## 2017-06-21 NOTE — PROGRESS NOTES
Nancy Berman   :1950    Date of encounter:2017        HPI:  Nancy Berman is a 67 y.o. female who returns here today 2 months status post IM rodding of the left humerus.  She states that she is removing the sling some and working on gentle range of motion of the elbow and wrist.  She still having difficulty with full extension of the elbow.  She also states that she still has pain in the shoulder with any little movement.  Although she has pain present it is improving since previous visit.  She denies paresthesias.      Exam:   on examination she has no significant swelling or ecchymosis.  She still has some mild tenderness on palpation along the mid shaft of the humerus.  She has stiffness in her shoulder is and elbow.  She had a lacks about 15 of full extension of the elbow.  Her neurovascular status is intact.    Radiology:   2 views of the left shoulder were reviewed revealing the intramedullary brendan in the humerus with unchanged alignment of the comminuted fracture of the humerus.  There is evidence of early callus formation around the fracture site.    Assessment    ICD-10-CM ICD-9-CM   1. Closed displaced comminuted fracture of shaft of left humerus with routine healing, subsequent encounter S42.352D V54.11       Plan:   a 67-year-old female status post IM rodding of the left humerus.  She is now 2 months postop.  X-rays today reveal no change in alignment with evidence of early callus formation around the fracture.  We will get her started in formal physical therapy working on gentle range of motion at the elbow and shoulder.  We will have her return back in 6 weeks for repeat x-rays and evaluation.    Written by, Miya LUCAS, acting as a scribe for Dr. Montse MILLER

## 2017-07-27 DIAGNOSIS — S42.352D CLOSED DISPLACED COMMINUTED FRACTURE OF SHAFT OF LEFT HUMERUS WITH ROUTINE HEALING, SUBSEQUENT ENCOUNTER: Primary | ICD-10-CM

## 2017-08-02 ENCOUNTER — OFFICE VISIT (OUTPATIENT)
Dept: ORTHOPEDIC SURGERY | Facility: CLINIC | Age: 67
End: 2017-08-02

## 2017-08-02 ENCOUNTER — HOSPITAL ENCOUNTER (OUTPATIENT)
Dept: GENERAL RADIOLOGY | Facility: HOSPITAL | Age: 67
Discharge: HOME | End: 2017-08-02
Attending: ORTHOPAEDIC SURGERY | Admitting: ORTHOPAEDIC SURGERY

## 2017-08-02 VITALS — BODY MASS INDEX: 31.66 KG/M2 | HEIGHT: 66 IN | WEIGHT: 197 LBS

## 2017-08-02 DIAGNOSIS — S42.352D CLOSED DISPLACED COMMINUTED FRACTURE OF SHAFT OF LEFT HUMERUS WITH ROUTINE HEALING, SUBSEQUENT ENCOUNTER: ICD-10-CM

## 2017-08-02 DIAGNOSIS — S42.352D CLOSED DISPLACED COMMINUTED FRACTURE OF SHAFT OF LEFT HUMERUS WITH ROUTINE HEALING, SUBSEQUENT ENCOUNTER: Primary | ICD-10-CM

## 2017-08-02 PROCEDURE — 73060 X-RAY EXAM OF HUMERUS: CPT | Performed by: RADIOLOGY

## 2017-08-02 PROCEDURE — 73060 X-RAY EXAM OF HUMERUS: CPT

## 2017-08-02 PROCEDURE — 99213 OFFICE O/P EST LOW 20 MIN: CPT | Performed by: ORTHOPAEDIC SURGERY

## 2017-08-02 NOTE — PROGRESS NOTES
Nancy Berman   :1950    Date of encounter:2017        HPI:  Nancy Berman is a 67 y.o. female who returns here today status post IM rodding of the left humerus.  Date of surgery was 2017.  She states she's been participating in physical therapy for the last 3 weeks.  She states she is beginning to see some improvement with her range of motion in her elbow    And her shoulder.  She states her shoulder is still stiff but it is slowly improving.  She states pain is not as severe as it was at her last visit.  She denies paresthesias.    Exam:   Examination of the left shoulder reveals no significant swelling or ecchymosis.  She still has mild tenderness proximal humerus.  She has normal range of motion now on her elbow and wrist.  She can forward flex the shoulder to about 70°.  Her neurovascular status is intact.    Radiology:   2 views of the left shoulder were reviewed revealing intramedullary brendan in the humerus with unchanged alignment of the comminuted fracture of the humerus.  There is evidence of callus formation and healing    Assessment    ICD-10-CM ICD-9-CM   1. Closed displaced comminuted fracture of shaft of left humerus with routine healing, subsequent encounter S42.352D V54.11       Plan:   A 67-year-old female 3-1/2 months status post intramedullary rodding of the left humerus.  X-rays today reveal no change in alignment with evidence of healing.  She is beginning to improve clinically with pain and motion.  We will continue physical therapy for an additional 6 weeks and she'll return back in 6 her follow-up    Written by, Myia LUCAS, acting as a scribe for Dr. Willard, as    CC Julee MILLER

## 2017-08-30 ENCOUNTER — TELEPHONE (OUTPATIENT)
Dept: ORTHOPEDIC SURGERY | Facility: CLINIC | Age: 67
End: 2017-08-30

## 2017-08-30 NOTE — TELEPHONE ENCOUNTER
Patient call requesting a few more pain medication for the pain in her left arm. She was an IM rodding left humerus on 4/20/17, she is attending PT and is still have some pain. Last Rx was Norco 7.5/325 #40 on 08/02/17     Spoke with Dr. Willard, he has written an Rx for Norco 7.5/325 1 po q6h prn for pain #30 NR. Patient has been notified the proscription is ready for .

## 2017-09-26 DIAGNOSIS — S42.352D CLOSED DISPLACED COMMINUTED FRACTURE OF SHAFT OF LEFT HUMERUS WITH ROUTINE HEALING, SUBSEQUENT ENCOUNTER: Primary | ICD-10-CM

## 2017-09-27 ENCOUNTER — OFFICE VISIT (OUTPATIENT)
Dept: ORTHOPEDIC SURGERY | Facility: CLINIC | Age: 67
End: 2017-09-27

## 2017-09-27 ENCOUNTER — HOSPITAL ENCOUNTER (OUTPATIENT)
Dept: GENERAL RADIOLOGY | Facility: HOSPITAL | Age: 67
Discharge: HOME | End: 2017-09-27
Attending: ORTHOPAEDIC SURGERY | Admitting: ORTHOPAEDIC SURGERY

## 2017-09-27 DIAGNOSIS — S42.352D CLOSED DISPLACED COMMINUTED FRACTURE OF SHAFT OF LEFT HUMERUS WITH ROUTINE HEALING, SUBSEQUENT ENCOUNTER: ICD-10-CM

## 2017-09-27 DIAGNOSIS — S42.352D CLOSED DISPLACED COMMINUTED FRACTURE OF SHAFT OF LEFT HUMERUS WITH ROUTINE HEALING, SUBSEQUENT ENCOUNTER: Primary | ICD-10-CM

## 2017-09-27 PROCEDURE — 99213 OFFICE O/P EST LOW 20 MIN: CPT | Performed by: ORTHOPAEDIC SURGERY

## 2017-09-27 PROCEDURE — 73060 X-RAY EXAM OF HUMERUS: CPT

## 2017-09-27 PROCEDURE — 73060 X-RAY EXAM OF HUMERUS: CPT | Performed by: RADIOLOGY

## 2017-09-27 NOTE — PROGRESS NOTES
Nancy Berman   :1950    Date of encounter:2017        HPI:  Nancy Berman is a 67 y.o. female who returns here today for follow-up of left humerus intramedullary brendan.  Date of surgery was 17.  She's been participating in physical therapy and doing well.  She states she had take off for approximate 2 weeks due to illness but is hopeful to get back started this week.  She states she has begun to see some improvement with her motion and pain with therapy.  She still complains of pain with movement but it is slowly improving..  She denies paresthesias.    PMH:   Patient Active Problem List   Diagnosis   • Hypertension   • High cholesterol   • Diabetes mellitus   • Low back pain   • Arthritis   • Closed displaced comminuted fracture of shaft of left humerus       Exam:  General Appearance:    67 y.o. female  cooperative, in no acute distress.  Alert and oriented x 3,                 There were no vitals filed for this visit.     Examination of the left shoulder reveals well-healed incision.  She has forward flexion and abduction to about 90°.  She still lacks a few degrees of full extension of the elbow.  Her strength is improving.  Her neurovascular status is intact.    Radiology:   2 views of the left shoulder were reviewed revealing intramedullary rodding good position with no change in alignment with continued evidence of healing of the humeral shaft fracture.    Assessment  No diagnosis found.    Plan:   A 67-year-old female 5 months status post IM brendan of the left humerus for approximately humeral shaft fracture.  X-rays today reveal no change in alignment with continued evidence of callus formation.  She is improving with physical therapy we do advise that she continue this.  We will have her return for follow-up in 2 months with repeat x-rays.  She was also given Norco 7.5 #30 for pain.    Written by, Miya LUCAS, acting as a scribe for Dr. Montse MILLER

## 2017-11-20 DIAGNOSIS — S42.352D CLOSED DISPLACED COMMINUTED FRACTURE OF SHAFT OF LEFT HUMERUS WITH ROUTINE HEALING, SUBSEQUENT ENCOUNTER: Primary | ICD-10-CM

## 2017-11-27 ENCOUNTER — OFFICE VISIT (OUTPATIENT)
Dept: ORTHOPEDIC SURGERY | Facility: CLINIC | Age: 67
End: 2017-11-27

## 2017-11-27 ENCOUNTER — HOSPITAL ENCOUNTER (OUTPATIENT)
Dept: GENERAL RADIOLOGY | Facility: HOSPITAL | Age: 67
Discharge: HOME | End: 2017-11-27
Attending: ORTHOPAEDIC SURGERY | Admitting: ORTHOPAEDIC SURGERY

## 2017-11-27 VITALS — HEIGHT: 66 IN | BODY MASS INDEX: 30.86 KG/M2 | WEIGHT: 192 LBS

## 2017-11-27 DIAGNOSIS — S42.352D CLOSED DISPLACED COMMINUTED FRACTURE OF SHAFT OF LEFT HUMERUS WITH ROUTINE HEALING, SUBSEQUENT ENCOUNTER: Primary | ICD-10-CM

## 2017-11-27 DIAGNOSIS — S42.352D CLOSED DISPLACED COMMINUTED FRACTURE OF SHAFT OF LEFT HUMERUS WITH ROUTINE HEALING, SUBSEQUENT ENCOUNTER: ICD-10-CM

## 2017-11-27 PROCEDURE — 73060 X-RAY EXAM OF HUMERUS: CPT | Performed by: RADIOLOGY

## 2017-11-27 PROCEDURE — 99213 OFFICE O/P EST LOW 20 MIN: CPT | Performed by: ORTHOPAEDIC SURGERY

## 2017-11-27 PROCEDURE — 73060 X-RAY EXAM OF HUMERUS: CPT

## 2017-11-27 RX ORDER — HYDROCODONE BITARTRATE AND ACETAMINOPHEN 7.5; 325 MG/1; MG/1
16 TABLET ORAL EVERY 6 HOURS PRN
Qty: 16 TABLET | Refills: 0
Start: 2017-11-27 | End: 2018-07-25 | Stop reason: ALTCHOICE

## 2017-11-27 NOTE — PROGRESS NOTES
"Nancy Berman   :1950    Date of encounter:2017        HPI:  Nancy Berman is a 67 y.o. female seen in follow-up if left humerus fracture sustained 2017, now 6 months post ORIF.  She continues to improve and her functional level has increased but she has some discomfort at times especially with sleeping.  If she lies on her left arm, she has pain and cramping.    PMH:   Patient Active Problem List   Diagnosis   • Hypertension   • High cholesterol   • Diabetes mellitus   • Low back pain   • Arthritis   • Closed displaced comminuted fracture of shaft of left humerus       Exam:  General Appearance:    67 y.o. female  cooperative, in no acute distress.  Alert and oriented x 3,        Left Shoulder: Examination today reveals no significant swelling.  She demonstrates full elbow extension, pronation and supination.  She has intact function of radial median and ulnar nerves.  Shoulder evaluation shows limited abduction to 90° with mild discomfort.             Vitals:    17 0906   Weight: 192 lb (87.1 kg)   Height: 66\" (167.6 cm)          Radiology:  Consolidated fracture of the proximal humerus with intramedullary rodding, good position without evidence of loosening.      Assessment:  67-year-old female with humerus fracture left 6 months ago.  She continues to improve but continues to have some discomfort in her arm.  We will continue to observe.  She will follow-up in 4 months time with repeat x-rays upon return.  She is given Norco 16 one by mouth every 6 when necessary for her nighttime pain.    ICD-10-CM ICD-9-CM   1. Closed displaced comminuted fracture of shaft of left humerus with routine healing, subsequent encounter S42.352D V54.11       Plan:  Today she is given Norco 7.5/325mg #16 with no refills. She will follow-up in 4 months.    This document was signed by Jose Willard MD.  20179:51 AM    Cc: ANGELA Clark                             "

## 2018-03-23 DIAGNOSIS — S42.352D CLOSED DISPLACED COMMINUTED FRACTURE OF SHAFT OF LEFT HUMERUS WITH ROUTINE HEALING, SUBSEQUENT ENCOUNTER: Primary | ICD-10-CM

## 2018-03-26 ENCOUNTER — HOSPITAL ENCOUNTER (OUTPATIENT)
Dept: GENERAL RADIOLOGY | Facility: HOSPITAL | Age: 68
Discharge: HOME | End: 2018-03-26
Attending: ORTHOPAEDIC SURGERY

## 2018-07-25 ENCOUNTER — CONSULT (OUTPATIENT)
Dept: ONCOLOGY | Facility: CLINIC | Age: 68
End: 2018-07-25

## 2018-07-25 VITALS
BODY MASS INDEX: 29.29 KG/M2 | DIASTOLIC BLOOD PRESSURE: 96 MMHG | OXYGEN SATURATION: 98 % | HEART RATE: 72 BPM | HEIGHT: 65 IN | TEMPERATURE: 98.3 F | WEIGHT: 175.8 LBS | RESPIRATION RATE: 18 BRPM | SYSTOLIC BLOOD PRESSURE: 167 MMHG

## 2018-07-25 DIAGNOSIS — C64.9 CARCINOMA OF KIDNEY, UNSPECIFIED LATERALITY (HCC): Primary | ICD-10-CM

## 2018-07-25 PROCEDURE — 99205 OFFICE O/P NEW HI 60 MIN: CPT | Performed by: INTERNAL MEDICINE

## 2018-07-25 RX ORDER — OXYCODONE AND ACETAMINOPHEN 7.5; 325 MG/1; MG/1
1 TABLET ORAL EVERY 6 HOURS PRN
Qty: 60 TABLET | Refills: 0 | Status: SHIPPED | OUTPATIENT
Start: 2018-07-25 | End: 2018-08-23 | Stop reason: SDUPTHER

## 2018-07-25 NOTE — PROGRESS NOTES
"  Name:  Nancy Berman  :  1950  Date:  2018     REFERRING PROVIDER  ANGELA Clark    PRIMARY CARE PROVIDER  ANGELA Clark    REASON FOR CONSULTATION  1. Carcinoma of kidney, unspecified laterality (CMS/HCC)      CHIEF COMPLAINT  Slightly increased back pain compared to many months ago.    Dear Ms. Lerner,    HISTORY OF PRESENT ILLNESS:   Thank you for referring Ms. Berman to our medical oncology clinic. As you are aware, she is a pleasant, 68 y.o.,  female with a history of hypertension, diabetes and chronic kidney disease who was initially diagnosed with, and underwent an uncomplicated resection of, a left-sided kidney cancer in . Adjuvant therapy was not indicated. Ultimately, she had been doing very well from the standpoint of this disease ever since then (for the past ten years). However, in early summer 2018, as part of a routine follow up evaluation for her chronic kidney disease (of her remaining, right kidney), you ordered a renal ultrasound. Unexpectedly, this study identified a retroperitoneal mass; and subsequent CT scans (performed in late 2018) showed \"nodular foci in the retroperitoneum\", a \"heterogeneous lesion inferior in the right lobe of the liver\" and \"small, tiny peripheral densities in the lungs most noticeable on the right\", consistent with more widespread and metastatic disease. She underwent a CT-guided biopsy of a retroperitoneal mass at  on 2018, and the pathology results were consistent with recurrent, renal cell carcinoma. She is now referred to our clinic for further evaluation and management.    INTERIM HISTORY:  Ms. Berman presents to clinic today for initial consultation accompanied by her daughter. They confirm the above history. She has a history of longstanding back pain (which, for years, has had a tendency to worsen when she stands up to wash dishes and forces her to sit down periodically to rest) and " fatigue, both of which may have slowly been getting a little worse recently. However, for the most part, she does not feel much different now than she did last year.    Past Medical History:   Diagnosis Date   • Arthritis    • Cancer (CMS/HCC)     left Kidney   • Chronic kidney disease, stage III (moderate)    • Diabetes mellitus (CMS/HCC)    • High cholesterol    • Hypertension    • Low back pain    • Spinal stenosis        Past Surgical History:   Procedure Laterality Date   • BLADDER SURGERY     • HYSTERECTOMY     • KIDNEY SURGERY Left     Nephrectomy for cancerous mass   • [place] OPEN BHAVANA FIXATN HUMERAL SHAFT FX Left 4/20/2017    Procedure: LEFT HUMERUS INTRAMEDULLARY NAIL/BHAVANA INSERTION;  Surgeon: Jose Willard MD;  Location: [facility];  Service: Orthopedics       Social History     Social History   • Marital status: Legally      Spouse name: N/A   • Number of children: N/A   • Years of education: N/A     Occupational History   • Not on file.     Social History Main Topics   • Smoking status: Never Smoker   • Smokeless tobacco: Never Used   • Alcohol use Yes      Comment: social   • Drug use: Unknown   • Sexual activity: Defer     Other Topics Concern   • Not on file     Social History Narrative   • No narrative on file       Family History   Problem Relation Age of Onset   • Parkinsonism Mother    • Heart disease Father    • Diabetes Father    • Cancer Brother        Allergies   Allergen Reactions   • Sulfa Antibiotics        Current Outpatient Prescriptions   Medication Sig Dispense Refill   • aspirin 325 MG tablet Take 325 mg by mouth daily.     • gabapentin (NEURONTIN) 600 MG tablet Take 600 mg by mouth 3 (three) times a day.     • metFORMIN XR (GLUCOPHAGE-XR) 500 MG 24 hr tablet Take 500 mg by mouth daily with breakfast.     • oxyCODONE-acetaminophen (PERCOCET) 7.5-325 MG per tablet Take 1 tablet by mouth Every 6 (Six) Hours As Needed for Moderate Pain . 60 tablet 0     No current  "facility-administered medications for this visit.      REVIEW OF SYSTEMS  CONSTITUTIONAL:  No fever, chills or night sweats. Chronic fatigue, stable.  EYES:  No blurry vision, diplopia or other vision changes.  ENT:  No hearing loss, nosebleeds or sore throat.  CARDIOVASCULAR:  No palpitations, arrhythmia, syncopal episodes or edema.  PULMONARY:  No hemoptysis, wheezing, chronic cough or shortness of breath.  GASTROINTESTINAL:  No nausea or vomiting.  No constipation or diarrhea.  No abdominal pain.  GENITOURINARY:  No hematuria, kidney stones or frequent urination.  MUSCULOSKELETAL:  Chronic back pain, perhaps slightly worse in recent months.  INTEGUMENTARY: No rashes or pruritus.  ENDOCRINE:  No excessive thirst or hot flashes.  HEMATOLOGIC:  No history of free bleeding, spontaneous bleeding or clotting.  IMMUNOLOGIC:  No allergies or frequent infections.  NEUROLOGIC: No numbness, tingling, seizures or weakness.  PSYCHIATRIC:  Some increased anxiety over her recent diagnosis with recurrent, metastatic cancer.    PHYSICAL EXAMINATION  /96   Pulse 72   Temp 98.3 °F (36.8 °C) (Oral)   Resp 18   Ht 165.1 cm (65\")   Wt 79.7 kg (175 lb 12.8 oz)   SpO2 98%   BMI 29.25 kg/m²     GENERAL:  A well-developed, well-nourished,  female in no acute distress.  HEENT:  Pupils equally round and reactive to light.  Extraocular muscles intact.  CARDIOVASCULAR:  Regular rate and rhythm.  No murmurs, gallops or rubs.  LUNGS:  Clear to auscultation bilaterally.  ABDOMEN:  Soft, nontender, nondistended with positive bowel sounds.  EXTREMITIES:  No clubbing, cyanosis or edema bilaterally.  SKIN:  No rashes or petechiae.  NEURO:  Cranial nerves grossly intact.  No focal deficits.  PSYCH:  Alert and oriented x3.    LABORATORY    Lab Results   Component Value Date    WBC 8.44 04/22/2017    HGB 8.4 (L) 04/22/2017    HCT 26.0 (L) 04/22/2017    .8 (H) 04/22/2017     04/22/2017    NEUTROABS 6.05 " 04/22/2017       Lab Results   Component Value Date     (L) 04/22/2017    K 3.8 04/22/2017     04/22/2017    CO2 24.3 04/22/2017    BUN 12 04/22/2017    CREATININE 1.05 04/22/2017    GLUCOSE 147 (H) 04/22/2017    CALCIUM 8.9 04/22/2017    AST 48 (H) 04/22/2017    ALT 16 04/22/2017    ALKPHOS 86 04/22/2017    BILITOT 0.7 04/22/2017    PROTEINTOT 6.8 04/22/2017    ALBUMIN 3.90 04/22/2017     IMAGING  CT chest, abdomen and pelvis with contrast (06/30/2018, at ):  Impression:  1) Small, tiny peripheral densities in the lungs most noticeable on the right.  2) Heterogeneous lesion inferior in the right lobe of the liver.  3) Heterogeneous enhancing nodular foci in the retroperitoneum.  4) Vascular calcifications with moderate coronary artery disease.  5) Moderate degenerative changes in the spine with moderate spinal canal stenosis.    PATHOLOGY  Right retroperitoneal mass (07/12/2018):  Positive for malignancy; consistent with recurrent renal cell carcinoma. PAX8 is strongly positive in tumor cells, and Inhibin is negative.    Right kidney, radical nephrectomy (11/05/2008, per pathology report; outside slides):  Renal cell carcinoma, clear cell type. Unremarkable adrenal gland.    IMPRESSION AND PLAN  Ms. Berman is a 68 y.o.,  female with:  1. Renal cell carcinoma: Initially diagnosed in 2008 and status post a left-sided nephrectomy, with no signs/symptoms of recurrent disease for the next ten years. Unfortunately, as of June 2018, this is no longer the case. CT scans performed at  on 06/30/2018 have identified multiple foci in the retroperitoneum (along with possible lung lesions), consistent with (and CT-guided FNA confirmed) recurrent, and now metastatic, disease. I had a long discussion with the patient and her daughter in clinic today regarding this diagnosis and, in general terms, its prognosis. In short, they are aware that this disease is no longer curable; but that, particularly  given her good performance status, it is treatable. While there are multiple, systemic options available to us (including PO TKIs, such as pazopanib, and IV immunotherapy, such as qmonthly nivolumab), I suggested that it would also be reasonable to defer starting any specific treatment immediately. Renal cell carcinoma is typically slow growing, and this appears to especially be the case here. She did not relapse for ten years, the CT scans performed in late June 2018 at  showed a (at least at that time) relatively low tumor burden; and she is currently still minimally (if at all) symptomatic. After a long discussion regarding these options, it was agreed that we will start to expectantly monitor her disease for clear signs of significant progression at this time. In general, the longer an elderly patient with multiple comobidities can avoid the potential side effects of palliative treatment, the better. We will see her back in clinic in one month with a repeat CBC, CMP and noncontrasted CT scans. The patient is aware to notify is between now and then if she develops new or worsening symptoms, such as shortness of breath or increasing back pain, as starting treatment (PO pazopanib will likely be offered first) can be reconsidered at any time.  2. Back pain: A chronic issue which issue #1 may be starting to exacerbate. Our clinic has agreed to provide her with her pain medicine from this point. Prn [place] has been minimally effective. A Rx for Percocet 7.5/325 mg q6hr prn was provided today. Continue to monitor.  The patient and her daughter were in agreement with these plans.    ACO Quality Measures:  a) The patient does not currently use tobacco products (she quit smoking a long time ago).  b) The patient's BMI is above normal parameters. Plan includes a referral to primary care.  c) The current outpatient and discharge medications have been reconciled for the patient.    It is a pleasure to participate in Ms.  Berman's care. Please do not hesitate to call with any questions or concerns that you may have.    A total of 60 minutes were spent coordinating this patient’s care in clinic today; more than 50% of this time was face-to-face with the patient and her daughter, reviewing her medical history, discussing the diagnosis and counseling on the current evaluation, treatment and followup plan. All questions were answered to their satisfaction.    FOLLOW UP  Rx for Percocet 7.5 mg PO q6hr prn disp #60 provided today. Return to clinic in 1 month with a CBC, CMP and repeat CTs of the chest, abdomen and pelvis without contrast.        This document was electronically signed by GABY Graham MD July 25, 2018 4:29 PM      CC: ANGELA Clark

## 2018-08-16 ENCOUNTER — HOSPITAL ENCOUNTER (OUTPATIENT)
Dept: CT IMAGING | Facility: HOSPITAL | Age: 68
Discharge: HOME | End: 2018-08-16
Attending: INTERNAL MEDICINE | Admitting: INTERNAL MEDICINE

## 2018-08-16 ENCOUNTER — HOSPITAL ENCOUNTER (OUTPATIENT)
Dept: CT IMAGING | Facility: HOSPITAL | Age: 68
Discharge: HOME | End: 2018-08-16
Attending: INTERNAL MEDICINE

## 2018-08-16 DIAGNOSIS — C64.9 CARCINOMA OF KIDNEY, UNSPECIFIED LATERALITY (HCC): ICD-10-CM

## 2018-08-16 PROCEDURE — 74176 CT ABD & PELVIS W/O CONTRAST: CPT | Performed by: RADIOLOGY

## 2018-08-16 PROCEDURE — 71250 CT THORAX DX C-: CPT

## 2018-08-16 PROCEDURE — 74176 CT ABD & PELVIS W/O CONTRAST: CPT

## 2018-08-16 PROCEDURE — 71250 CT THORAX DX C-: CPT | Performed by: RADIOLOGY

## 2018-08-23 ENCOUNTER — DOCUMENTATION (OUTPATIENT)
Dept: ONCOLOGY | Facility: HOSPITAL | Age: 68
End: 2018-08-23

## 2018-08-23 ENCOUNTER — OFFICE VISIT (OUTPATIENT)
Dept: ONCOLOGY | Facility: CLINIC | Age: 68
End: 2018-08-23

## 2018-08-23 ENCOUNTER — LAB (OUTPATIENT)
Dept: ONCOLOGY | Facility: CLINIC | Age: 68
End: 2018-08-23

## 2018-08-23 VITALS
TEMPERATURE: 97.9 F | OXYGEN SATURATION: 98 % | WEIGHT: 179 LBS | BODY MASS INDEX: 29.79 KG/M2 | DIASTOLIC BLOOD PRESSURE: 95 MMHG | HEART RATE: 97 BPM | SYSTOLIC BLOOD PRESSURE: 139 MMHG | RESPIRATION RATE: 18 BRPM

## 2018-08-23 DIAGNOSIS — C64.9 CARCINOMA OF KIDNEY, UNSPECIFIED LATERALITY (HCC): Primary | ICD-10-CM

## 2018-08-23 DIAGNOSIS — C64.9 CARCINOMA OF KIDNEY, UNSPECIFIED LATERALITY (HCC): ICD-10-CM

## 2018-08-23 LAB
BASOPHILS # BLD AUTO: 0.04 10*3/MM3 (ref 0–0.3)
BASOPHILS NFR BLD AUTO: 0.7 % (ref 0–2)
DEPRECATED RDW RBC AUTO: 51.7 FL (ref 37–54)
EOSINOPHIL # BLD AUTO: 0.05 10*3/MM3 (ref 0–0.7)
EOSINOPHIL NFR BLD AUTO: 0.9 % (ref 0–7)
ERYTHROCYTE [DISTWIDTH] IN BLOOD BY AUTOMATED COUNT: 14.7 % (ref 11.5–14.5)
HCT VFR BLD AUTO: 43.4 % (ref 37–47)
HGB BLD-MCNC: 13.8 G/DL (ref 12–16)
IMM GRANULOCYTES # BLD: 0.01 10*3/MM3 (ref 0–0.03)
IMM GRANULOCYTES NFR BLD: 0.2 % (ref 0–0.5)
LYMPHOCYTES # BLD AUTO: 2.05 10*3/MM3 (ref 1–3)
LYMPHOCYTES NFR BLD AUTO: 35.7 % (ref 16–46)
MCH RBC QN AUTO: 31.2 PG (ref 27–33)
MCHC RBC AUTO-ENTMCNC: 31.8 G/DL (ref 33–37)
MCV RBC AUTO: 98.2 FL (ref 80–94)
MONOCYTES # BLD AUTO: 0.49 10*3/MM3 (ref 0.1–0.9)
MONOCYTES NFR BLD AUTO: 8.5 % (ref 0–12)
NEUTROPHILS # BLD AUTO: 3.1 10*3/MM3 (ref 1.4–6.5)
NEUTROPHILS NFR BLD AUTO: 54 % (ref 40–75)
PLATELET # BLD AUTO: 248 10*3/MM3 (ref 130–400)
PMV BLD AUTO: 12.1 FL (ref 6–10)
RBC # BLD AUTO: 4.42 10*6/MM3 (ref 4.2–5.4)
WBC NRBC COR # BLD: 5.74 10*3/MM3 (ref 4.5–12.5)

## 2018-08-23 PROCEDURE — 85025 COMPLETE CBC W/AUTO DIFF WBC: CPT | Performed by: INTERNAL MEDICINE

## 2018-08-23 PROCEDURE — 99214 OFFICE O/P EST MOD 30 MIN: CPT | Performed by: INTERNAL MEDICINE

## 2018-08-23 RX ORDER — OXYCODONE AND ACETAMINOPHEN 7.5; 325 MG/1; MG/1
1 TABLET ORAL EVERY 6 HOURS PRN
Qty: 60 TABLET | Refills: 0 | Status: SHIPPED | OUTPATIENT
Start: 2018-08-23 | End: 2018-10-04 | Stop reason: SDUPTHER

## 2018-08-23 NOTE — PROGRESS NOTES
SS received referral for SS to arrange Rolator with wheels.  SS contacted [facility] [phone] per Sara to arrange Rolator with wheels.  SS faxed [phone] face sheet, MD order, and Dictation note.  Pt and daughter to stop on their way home to  [place].  Copy of order to be placed in chart.  SS will follow.

## 2018-08-23 NOTE — PROGRESS NOTES
"  Name:  Nancy Berman  :  1950  Date:  2018     REFERRING PROVIDER  ANGELA Clark    PRIMARY CARE PROVIDER  Julee Arechiga APRN    REASON FOR FOLLOWUP  1. Carcinoma of kidney, unspecified laterality (CMS/HCC)      CHIEF COMPLAINT  Slightly increased back pain compared to earlier this year, currently manageable with prn Percocet.    Dear Ms. Lerner,    HISTORY OF PRESENT ILLNESS:   I saw Ms. Berman in follow up today in our medical oncology clinic. As you are aware, she is a pleasant, 68 y.o.,  female with a history of hypertension, diabetes and chronic kidney disease who was initially diagnosed with, and underwent an uncomplicated resection of, a left-sided kidney cancer in . Adjuvant therapy was not indicated. Ultimately, she had been doing very well from the standpoint of this disease ever since then (for the past ten years). However, in early summer 2018, as part of a routine follow up evaluation for her chronic kidney disease (of her remaining, right kidney), you ordered a renal ultrasound. Unexpectedly, this study identified a retroperitoneal mass; and subsequent CT scans (performed in late 2018) showed \"nodular foci in the retroperitoneum\", a \"heterogeneous lesion inferior in the right lobe of the liver\" and \"small, tiny peripheral densities in the lungs most noticeable on the right\", consistent with more widespread and metastatic disease. She underwent a CT-guided biopsy of a retroperitoneal mass at  on 2018, and the pathology results were consistent with recurrent, renal cell carcinoma. She was subsequently referred to our clinic for further evaluation and management.    INTERIM HISTORY:  Ms. Berman returns to clinic today for follow up accompanied by her two daughters. She has a history of longstanding back pain (which, for years, has had a tendency to worsen when she stands up to wash dishes and forces her to sit down periodically " to rest) and fatigue, both of which may have slowly been getting a little worse recently. However, for the most part, she does not feel much different now than she did last year.    Past Medical History:   Diagnosis Date   • Arthritis    • Cancer (CMS/HCC)     left Kidney   • Chronic kidney disease, stage III (moderate)    • Diabetes mellitus (CMS/HCC)    • High cholesterol    • Hypertension    • Low back pain    • Spinal stenosis        Past Surgical History:   Procedure Laterality Date   • BLADDER SURGERY     • HYSTERECTOMY     • KIDNEY SURGERY Left     Nephrectomy for cancerous mass   • [place] OPEN BHAVANA FIXATN HUMERAL SHAFT FX Left 4/20/2017    Procedure: LEFT HUMERUS INTRAMEDULLARY NAIL/BHAVANA INSERTION;  Surgeon: Jose Willard MD;  Location: [facility];  Service: Orthopedics       Social History     Social History   • Marital status: Legally      Spouse name: N/A   • Number of children: N/A   • Years of education: N/A     Occupational History   • Not on file.     Social History Main Topics   • Smoking status: Never Smoker   • Smokeless tobacco: Never Used   • Alcohol use Yes      Comment: social   • Drug use: Unknown   • Sexual activity: Defer     Other Topics Concern   • Not on file     Social History Narrative   • No narrative on file       Family History   Problem Relation Age of Onset   • Parkinsonism Mother    • Heart disease Father    • Diabetes Father    • Cancer Brother        Allergies   Allergen Reactions   • Sulfa Antibiotics        Current Outpatient Prescriptions   Medication Sig Dispense Refill   • aspirin 325 MG tablet Take 325 mg by mouth daily.     • gabapentin (NEURONTIN) 600 MG tablet Take 600 mg by mouth 3 (three) times a day.     • metFORMIN XR (GLUCOPHAGE-XR) 500 MG 24 hr tablet Take 500 mg by mouth daily with breakfast.     • oxyCODONE-acetaminophen (PERCOCET) 7.5-325 MG per tablet Take 1 tablet by mouth Every 6 (Six) Hours As Needed for Moderate Pain . 60 tablet 0     No current  facility-administered medications for this visit.      REVIEW OF SYSTEMS  CONSTITUTIONAL:  No fever, chills or night sweats. Chronic fatigue, stable.  EYES:  No blurry vision, diplopia or other vision changes.  ENT:  No hearing loss, nosebleeds or sore throat.  CARDIOVASCULAR:  No palpitations, arrhythmia, syncopal episodes or edema.  PULMONARY:  No hemoptysis, wheezing, chronic cough or shortness of breath.  GASTROINTESTINAL:  No nausea or vomiting.  No constipation or diarrhea. No abdominal pain.  GENITOURINARY:  No hematuria, kidney stones or frequent urination.  MUSCULOSKELETAL:  Chronic back pain, radiating slightly into her right flank, perhaps slightly worse in recent months.  INTEGUMENTARY: No rashes or pruritus.  ENDOCRINE:  No excessive thirst or hot flashes.  HEMATOLOGIC:  No history of free bleeding, spontaneous bleeding or clotting.  IMMUNOLOGIC:  No allergies or frequent infections.  NEUROLOGIC: No numbness, tingling, seizures or weakness.  PSYCHIATRIC:  Some increased anxiety over her recent diagnosis with recurrent, metastatic cancer.    PHYSICAL EXAMINATION  /95   Pulse 97   Temp 97.9 °F (36.6 °C) (Oral)   Resp 18   Wt 81.2 kg (179 lb)   SpO2 98%   BMI 29.79 kg/m²     GENERAL:  A well-developed, well-nourished,  female in no acute distress.  HEENT:  Pupils equally round and reactive to light.  Extraocular muscles intact.  CARDIOVASCULAR:  Regular rate and rhythm.  No murmurs, gallops or rubs.  LUNGS:  Clear to auscultation bilaterally.  ABDOMEN:  Soft, nontender, nondistended with positive bowel sounds.  EXTREMITIES:  No clubbing, cyanosis or edema bilaterally.  SKIN:  No rashes or petechiae.  NEURO:  Cranial nerves grossly intact.  No focal deficits.  PSYCH:  Alert and oriented x3.    LABORATORY    Lab Results   Component Value Date    WBC 5.74 08/23/2018    HGB 13.8 08/23/2018    HCT 43.4 08/23/2018    MCV 98.2 (H) 08/23/2018     08/23/2018    NEUTROABS 3.10  08/23/2018       Lab Results   Component Value Date     (L) 04/22/2017    K 3.8 04/22/2017     04/22/2017    CO2 24.3 04/22/2017    BUN 12 04/22/2017    CREATININE 1.05 04/22/2017    GLUCOSE 147 (H) 04/22/2017    CALCIUM 8.9 04/22/2017    AST 48 (H) 04/22/2017    ALT 16 04/22/2017    ALKPHOS 86 04/22/2017    BILITOT 0.7 04/22/2017    PROTEINTOT 6.8 04/22/2017    ALBUMIN 3.90 04/22/2017     CBC (08/23/2018): WBCs: 5.7; HgB: 13.8; Hct: 43.4; platelets: 248    IMAGING  CT chest, abdomen and pelvis with contrast (06/30/2018, at ):  Impression:  1) Small, tiny peripheral densities in the lungs most noticeable on the right.  2) Heterogeneous lesion inferior in the right lobe of the liver.  3) Heterogeneous enhancing nodular foci in the retroperitoneum.  4) Vascular calcifications with moderate coronary artery disease.  5) Moderate degenerative changes in the spine with moderate spinal canal stenosis.    CT chest without contrast (08/16/2018):  Impression: Negative CT scan of the thorax. Nothing is seen to suggest metastatic disease in the chest.    CT abdomen and pelvis without contrast (08/16/2018):  Impression: Patient status post right nephrectomy. There is a low density mass in the retroperitoneum that would be consistent with metastatic disease to the retroperitoneal lymph nodes possibly involving the inferior vena cava. It measures approximately 3.8 cm in diameter.    PATHOLOGY  Right retroperitoneal mass (07/12/2018):  Positive for malignancy; consistent with recurrent renal cell carcinoma. PAX8 is strongly positive in tumor cells, and Inhibin is negative.    Right kidney, radical nephrectomy (11/05/2008, per pathology report; outside slides):  Renal cell carcinoma, clear cell type. Unremarkable adrenal gland.    IMPRESSION AND PLAN  Ms. Berman is a 68 y.o.,  female with:  1. Renal cell carcinoma: Initially diagnosed in 2008 and status post a left-sided nephrectomy, with no  signs/symptoms of recurrent disease for the next ten years. Unfortunately, as of June 2018, this is no longer the case. CT scans performed at  on 06/30/2018 identified multiple foci in the retroperitoneum (along with possible lung lesions), consistent with (and CT-guided FNA confirmed) recurrent, and now metastatic, disease. I had another long discussion with the patient and her daughters in clinic today regarding this diagnosis and, in general terms, its prognosis. In short, they are aware that this disease is no longer curable; but that, particularly given her good performance status, it is treatable. While there are multiple, systemic options available to us (including PO TKIs, such as pazopanib, and IV immunotherapy, such as qmonthly nivolumab), it was (and still is) also reasonable to defer starting any specific treatment immediately. Renal cell carcinoma is typically slow growing, and this appears to especially be the case here. She did not relapse for ten years, the CT scans performed in late June 2018 at  showed a relatively low tumor burden (and this continues to be the case on the most recent repeat imaging, CT scans performed on 08/16/2018 and also summarized above); and she was (and remains) minimally (if at all) symptomatic. After another long discussion regarding these options, it remains agreed that we will continue to expectantly monitor her disease for clear signs of significant progression at this time. In general, the longer an elderly patient with multiple comobidities can avoid the potential side effects of palliative treatment, the better. We will see her back in clinic in six weeks with a repeat CBC, CMP and NM bone scan. We will repeat CT scans in three months. The patient is aware to notify is between now and then if she develops new or worsening symptoms, such as shortness of breath or increasing back pain, as starting treatment (PO pazopanib will likely be offered first) can be  reconsidered at any time.  2. Back pain: A chronic issue which issue #1 may be starting to exacerbate. Our clinic has agreed to provide her with her pain medicine from this point. Prn [place] had been minimally effective, but Percocet 7.5/325 mg q6hr prn has been beneficial. A refill of the latter was provided today. Continue to monitor.  The patient and her daughters were in agreement with these plans.    ACO Quality Measures:  a) The patient does not currently use tobacco products (she quit smoking a long time ago).  b) The patient's BMI is above normal parameters. Plan includes a referral to primary care.  c) The current outpatient and discharge medications have been reconciled for the patient.    It is a pleasure to participate in Ms. Berman's care. Please do not hesitate to call with any questions or concerns that you may have.    A total of 30 minutes were spent coordinating this patient’s care in clinic today; more than 50% of this time was face-to-face with the patient and her daughters, reviewing her interim medical history, discussing the results of the recent repeat CT scans and counseling on the current treatment and followup plan. All questions were answered to their satisfaction.    FOLLOW UP  Refill of Percocet 7.5 mg PO q6hr prn disp #60 provided today. Return to clinic in 6 weeks with a CBC, CMP and NM bone scan. Repeat CTs of the chest, abdomen and pelvis without contrast in 3 months.        This document was electronically signed by GABY Graham MD August 23, 2018 12:21 PM      CC: ANGELA Clark

## 2018-09-27 ENCOUNTER — HOSPITAL ENCOUNTER (OUTPATIENT)
Dept: NUCLEAR MEDICINE | Facility: HOSPITAL | Age: 68
Discharge: HOME | End: 2018-09-27
Attending: INTERNAL MEDICINE

## 2018-09-27 DIAGNOSIS — C64.9 RENAL CELL CARCINOMA, UNSPECIFIED LATERALITY (HCC): Primary | ICD-10-CM

## 2018-09-27 DIAGNOSIS — C64.9 CARCINOMA OF KIDNEY, UNSPECIFIED LATERALITY (HCC): ICD-10-CM

## 2018-09-27 PROCEDURE — 78306 BONE IMAGING WHOLE BODY: CPT

## 2018-09-27 PROCEDURE — A9561 TC99M OXIDRONATE: HCPCS | Performed by: INTERNAL MEDICINE

## 2018-09-27 PROCEDURE — 0 TECHNETIUM OXIDRONATE KIT: Performed by: INTERNAL MEDICINE

## 2018-09-27 PROCEDURE — 78306 BONE IMAGING WHOLE BODY: CPT | Performed by: RADIOLOGY

## 2018-09-27 RX ADMIN — TECHNETIUM TC 99M OXIDRONATE 1 DOSE: 3.15 INJECTION, POWDER, LYOPHILIZED, FOR SOLUTION INTRAVENOUS at 09:54

## 2018-10-04 ENCOUNTER — LAB (OUTPATIENT)
Dept: ONCOLOGY | Facility: CLINIC | Age: 68
End: 2018-10-04

## 2018-10-04 ENCOUNTER — OFFICE VISIT (OUTPATIENT)
Dept: ONCOLOGY | Facility: CLINIC | Age: 68
End: 2018-10-04

## 2018-10-04 VITALS
WEIGHT: 179.4 LBS | SYSTOLIC BLOOD PRESSURE: 174 MMHG | HEART RATE: 67 BPM | DIASTOLIC BLOOD PRESSURE: 91 MMHG | BODY MASS INDEX: 29.85 KG/M2 | RESPIRATION RATE: 18 BRPM | TEMPERATURE: 97.9 F | OXYGEN SATURATION: 99 %

## 2018-10-04 DIAGNOSIS — C64.9 CARCINOMA OF KIDNEY, UNSPECIFIED LATERALITY (HCC): Primary | ICD-10-CM

## 2018-10-04 DIAGNOSIS — C64.9 RENAL CELL CARCINOMA, UNSPECIFIED LATERALITY (HCC): ICD-10-CM

## 2018-10-04 PROCEDURE — 99214 OFFICE O/P EST MOD 30 MIN: CPT | Performed by: INTERNAL MEDICINE

## 2018-10-04 RX ORDER — OXYCODONE AND ACETAMINOPHEN 7.5; 325 MG/1; MG/1
1 TABLET ORAL EVERY 6 HOURS PRN
Qty: 60 TABLET | Refills: 0 | Status: SHIPPED | OUTPATIENT
Start: 2018-10-04 | End: 2018-10-25 | Stop reason: SDUPTHER

## 2018-10-04 NOTE — PROGRESS NOTES
"  Name:  Nancy Berman  :  1950  Date:  10/4/2018     REFERRING PROVIDER  ANGELA Clark    PRIMARY CARE PROVIDER  Julee Arechiga APRN    REASON FOR FOLLOWUP  1. Carcinoma of kidney, unspecified laterality (CMS/HCC)      CHIEF COMPLAINT  Slightly increased back pain compared to earlier this year, currently still manageable with prn Percocet (although she has been out of them for the past week or so).    Dear Ms. Lerner,    HISTORY OF PRESENT ILLNESS:   I saw Ms. Berman in follow up today in our medical oncology clinic. As you are aware, she is a pleasant, 68 y.o.,  female with a history of hypertension, diabetes and chronic kidney disease who was initially diagnosed with, and underwent an uncomplicated resection of, a left-sided kidney cancer in . Adjuvant therapy was not indicated. Ultimately, she had been doing very well from the standpoint of this disease ever since then (for the past ten years). However, in early summer 2018, as part of a routine follow up evaluation for her chronic kidney disease (of her remaining, right kidney), you ordered a renal ultrasound. Unexpectedly, this study identified a retroperitoneal mass; and subsequent CT scans (performed in late 2018) showed \"nodular foci in the retroperitoneum\", a \"heterogeneous lesion inferior in the right lobe of the liver\" and \"small, tiny peripheral densities in the lungs most noticeable on the right\", consistent with more widespread and metastatic disease. She underwent a CT-guided biopsy of a retroperitoneal mass at  on 2018, and the pathology results were consistent with recurrent, renal cell carcinoma. She was subsequently referred to our clinic for further evaluation and management.    INTERIM HISTORY:  Ms. Berman returns to clinic today for follow up accompanied by one of her daughters. She has a history of longstanding back pain (which, for years, has had a tendency to worsen when " she stands up to wash dishes and forces her to sit down periodically to rest) and fatigue, both of which may have slowly been getting a little worse recently. However, for the most part, she does not feel much different now than she did last year. She has no new or other specific complaints today.    Past Medical History:   Diagnosis Date   • Arthritis    • Cancer (CMS/HCC)     left Kidney   • Chronic kidney disease, stage III (moderate) (CMS/HCC)    • Diabetes mellitus (CMS/HCC)    • High cholesterol    • Hypertension    • Low back pain    • Spinal stenosis        Past Surgical History:   Procedure Laterality Date   • BLADDER SURGERY     • HYSTERECTOMY     • KIDNEY SURGERY Left     Nephrectomy for cancerous mass   • MT OPEN BHAVANA FIXATN HUMERAL SHAFT FX Left 4/20/2017    Procedure: LEFT HUMERUS INTRAMEDULLARY NAIL/BHAVANA INSERTION;  Surgeon: Jose Willard MD;  Location: [facility];  Service: Orthopedics       Social History     Social History   • Marital status: Legally      Spouse name: N/A   • Number of children: N/A   • Years of education: N/A     Occupational History   • Not on file.     Social History Main Topics   • Smoking status: Never Smoker   • Smokeless tobacco: Never Used   • Alcohol use Yes      Comment: social   • Drug use: Unknown   • Sexual activity: Defer     Other Topics Concern   • Not on file     Social History Narrative   • No narrative on file       Family History   Problem Relation Age of Onset   • Parkinsonism Mother    • Heart disease Father    • Diabetes Father    • Cancer Brother        Allergies   Allergen Reactions   • Sulfa Antibiotics        Current Outpatient Prescriptions   Medication Sig Dispense Refill   • aspirin 325 MG tablet Take 325 mg by mouth daily.     • gabapentin (NEURONTIN) 600 MG tablet Take 600 mg by mouth 3 (three) times a day.     • metFORMIN XR (GLUCOPHAGE-XR) 500 MG 24 hr tablet Take 500 mg by mouth daily with breakfast.     • oxyCODONE-acetaminophen  (PERCOCET) 7.5-325 MG per tablet Take 1 tablet by mouth Every 6 (Six) Hours As Needed for Moderate Pain . 60 tablet 0     No current facility-administered medications for this visit.      REVIEW OF SYSTEMS  CONSTITUTIONAL:  No fever, chills or night sweats. Chronic fatigue, stable.  EYES:  No blurry vision, diplopia or other vision changes.  ENT:  No hearing loss, nosebleeds or sore throat.  CARDIOVASCULAR:  No palpitations, arrhythmia, syncopal episodes or edema.  PULMONARY:  No hemoptysis, wheezing, chronic cough or shortness of breath.  GASTROINTESTINAL:  No nausea or vomiting.  No constipation or diarrhea. No abdominal pain.  GENITOURINARY:  No hematuria, kidney stones or frequent urination.  MUSCULOSKELETAL:  Chronic back pain, radiating slightly into her right flank, perhaps slightly worse in recent months.  INTEGUMENTARY: No rashes or pruritus.  ENDOCRINE:  No excessive thirst or hot flashes.  HEMATOLOGIC:  No history of free bleeding, spontaneous bleeding or clotting.  IMMUNOLOGIC:  No allergies or frequent infections.  NEUROLOGIC: No numbness, tingling, seizures or weakness.  PSYCHIATRIC:  Some increased anxiety over her recent diagnosis with recurrent, metastatic cancer.    PHYSICAL EXAMINATION  /91   Pulse 67   Temp 97.9 °F (36.6 °C) (Oral)   Resp 18   Wt 81.4 kg (179 lb 6.4 oz)   SpO2 99%   BMI 29.85 kg/m²     GENERAL:  A well-developed, well-nourished, elderly,  female in no acute distress.  HEENT:  Pupils equally round and reactive to light.  Extraocular muscles intact.  CARDIOVASCULAR:  Regular rate and rhythm.  No murmurs, gallops or rubs.  LUNGS:  Clear to auscultation bilaterally.  ABDOMEN:  Soft, nontender, nondistended with positive bowel sounds.  EXTREMITIES:  No clubbing, cyanosis or edema bilaterally.  SKIN:  No rashes or petechiae.  NEURO:  Cranial nerves grossly intact.  No focal deficits.  PSYCH:  Alert and oriented x3.    LABORATORY    Lab Results   Component  Value Date    WBC 5.74 08/23/2018    HGB 13.8 08/23/2018    HCT 43.4 08/23/2018    MCV 98.2 (H) 08/23/2018     08/23/2018    NEUTROABS 3.10 08/23/2018       Lab Results   Component Value Date     (L) 04/22/2017    K 3.8 04/22/2017     04/22/2017    CO2 24.3 04/22/2017    BUN 12 04/22/2017    CREATININE 1.05 04/22/2017    GLUCOSE 147 (H) 04/22/2017    CALCIUM 8.9 04/22/2017    AST 48 (H) 04/22/2017    ALT 16 04/22/2017    ALKPHOS 86 04/22/2017    BILITOT 0.7 04/22/2017    PROTEINTOT 6.8 04/22/2017    ALBUMIN 3.90 04/22/2017     CBC (08/23/2018): WBCs: 5.7; HgB: 13.8; Hct: 43.4; platelets: 248    IMAGING  CT chest, abdomen and pelvis with contrast (06/30/2018, at ):  Impression:  1) Small, tiny peripheral densities in the lungs most noticeable on the right.  2) Heterogeneous lesion inferior in the right lobe of the liver.  3) Heterogeneous enhancing nodular foci in the retroperitoneum.  4) Vascular calcifications with moderate coronary artery disease.  5) Moderate degenerative changes in the spine with moderate spinal canal stenosis.    CT chest without contrast (08/16/2018):  Impression: Negative CT scan of the thorax. Nothing is seen to suggest metastatic disease in the chest.    CT abdomen and pelvis without contrast (08/16/2018):  Impression: Patient status post right nephrectomy. There is a low density mass in the retroperitoneum that would be consistent with metastatic disease to the retroperitoneal lymph nodes possibly involving the inferior vena cava. It measures approximately 3.8 cm in diameter.    NM bone scan, whole body (09/27/2018):  Impression: There are no findings to suggest metastatic disease or to explain the patient's symptoms.    PATHOLOGY  Right retroperitoneal mass (07/12/2018):  Positive for malignancy; consistent with recurrent renal cell carcinoma. PAX8 is strongly positive in tumor cells, and Inhibin is negative.    Right kidney, radical nephrectomy (11/05/2008, per  pathology report; outside slides):  Renal cell carcinoma, clear cell type. Unremarkable adrenal gland.    IMPRESSION AND PLAN  Ms. Berman is a 68 y.o.,  female with:  1. Renal cell carcinoma: Initially diagnosed in 2008 and status post a left-sided nephrectomy, with no signs/symptoms of recurrent disease for the next ten years. Unfortunately, as of June 2018, this is no longer the case. CT scans performed at  on 06/30/2018 identified multiple foci in the retroperitoneum (along with possible lung lesions), consistent with (and CT-guided FNA confirmed) recurrent, and now metastatic, disease. I have had several long discussions with the patient and her daughters since the time of her initial presentation in our clinic (on 07/25/2018) regarding this diagnosis and, in general terms, its prognosis. In short, they remain aware that this disease is no longer curable; but that, particularly given her good performance status, it is treatable. While there are multiple, systemic options available to us (including PO TKIs, such as pazopanib, and IV immunotherapy, such as qmonthly nivolumab), it was (and still is) also reasonable to defer starting any specific treatment immediately. Renal cell carcinoma is typically slow growing, and this appears to especially be the case here. She did not relapse for ten years, the CT scans performed in late June 2018 at  showed a relatively low tumor burden (and this continues to be the case on the most recent repeat imaging, CT scans performed on 08/16/2018 and a NM bone scan performed on 09/27/2018, all summarized above; the latter shows no evidence of skeletal mets); and she was (and remains) minimally (if at all) symptomatic. It remains agreed that we will continue to expectantly monitor her disease for clear signs of significant progression at this time. In general, the longer an elderly patient with multiple comobidities can avoid the potential side effects of  palliative treatment, the better. We will see her back in clinic in six weeks with a repeat CBC, CMP and CT scans. The patient is aware to notify us between now and then if she develops new or worsening symptoms, such as shortness of breath or increasing back pain, as starting treatment (PO pazopanib will likely be offered first) can be reconsidered at any time.  2. Back pain: A chronic issue which the retroperitoneal foci from issue #1 may be exacerbating. Our clinic has agreed to provide her with her pain medicine from this point. Prn [place] had been minimally effective, but Percocet 7.5/325 mg q6hr prn has been beneficial. A refill of the latter was provided today. Continue to monitor.  The patient and her daughter were in agreement with these plans.    [facility] Quality Measures:  a) The patient does not currently use tobacco products (she quit smoking a long time ago).  b) The patient's BMI is above normal parameters. Plan includes a referral to primary care.  c) The current outpatient and discharge medications have been reconciled for the patient.    It is a pleasure to participate in Ms. Berman's care. Please do not hesitate to call with any questions or concerns that you may have.    A total of 30 minutes were spent coordinating this patient’s care in clinic today; more than 50% of this time was face-to-face with the patient and her daughter, reviewing her interim medical history, discussing the results of the recent NM bone scan and counseling on the current treatment and followup plan. All questions were answered to their satisfaction.    FOLLOW UP  Refill of Percocet 7.5 mg PO q6hr prn disp #60 provided today. Return to clinic in 6 weeks with a CBC, CMP and CTs of the chest, abdomen and pelvis without contrast.        This document was electronically signed by GABY Graham MD October 4, 2018 2:58 PM      CC: ANGELA Clark

## 2018-10-25 ENCOUNTER — TELEPHONE (OUTPATIENT)
Dept: ONCOLOGY | Facility: HOSPITAL | Age: 68
End: 2018-10-25

## 2018-10-25 RX ORDER — OXYCODONE AND ACETAMINOPHEN 7.5; 325 MG/1; MG/1
1 TABLET ORAL EVERY 6 HOURS PRN
Qty: 120 TABLET | Refills: 0 | Status: SHIPPED | OUTPATIENT
Start: 2018-10-25 | End: 2018-11-20 | Stop reason: SDUPTHER

## 2018-10-25 NOTE — TELEPHONE ENCOUNTER
----- Message from GABY Graham MD sent at 10/25/2018  9:34 AM EDT -----  Contact: [phone]  Yes give her 120 thanks    ----- Message -----  From: Any Sal RN  Sent: 10/25/2018   9:28 AM  To: GABY Graham MD    Hey, looks like you wrote for the Percocet 7.5's 1 tab q6hr #60 on 10/4/18.  Do you want a repeat of the exact same script?  Want to increase the quantity?        ----- Message -----  From: Palmira Trevino MA  Sent: 10/25/2018   9:06 AM  To:  [facility]    Nancy is needing a prescription for her pain medication.    Thank you

## 2018-10-25 NOTE — TELEPHONE ENCOUNTER
Percocet prescription refill approved by Dr. Graham (with increased quantity), and printed prescription placed at  window for .  Called back to patient and informed her of script adjustment, and that it is ready for .  Pt verbalized understanding.

## 2018-11-15 ENCOUNTER — APPOINTMENT (OUTPATIENT)
Dept: CT IMAGING | Facility: HOSPITAL | Age: 68
End: 2018-11-15
Attending: INTERNAL MEDICINE

## 2018-11-20 ENCOUNTER — HOSPITAL ENCOUNTER (OUTPATIENT)
Dept: CT IMAGING | Facility: HOSPITAL | Age: 68
Discharge: HOME | End: 2018-11-20
Attending: INTERNAL MEDICINE

## 2018-11-20 ENCOUNTER — LAB (OUTPATIENT)
Dept: ONCOLOGY | Facility: CLINIC | Age: 68
End: 2018-11-20

## 2018-11-20 ENCOUNTER — OFFICE VISIT (OUTPATIENT)
Dept: ONCOLOGY | Facility: CLINIC | Age: 68
End: 2018-11-20

## 2018-11-20 ENCOUNTER — HOSPITAL ENCOUNTER (OUTPATIENT)
Dept: CT IMAGING | Facility: HOSPITAL | Age: 68
Discharge: HOME | End: 2018-11-20
Attending: INTERNAL MEDICINE | Admitting: INTERNAL MEDICINE

## 2018-11-20 VITALS
BODY MASS INDEX: 29.79 KG/M2 | OXYGEN SATURATION: 98 % | DIASTOLIC BLOOD PRESSURE: 95 MMHG | WEIGHT: 179 LBS | HEART RATE: 83 BPM | RESPIRATION RATE: 18 BRPM | SYSTOLIC BLOOD PRESSURE: 142 MMHG | TEMPERATURE: 97.5 F

## 2018-11-20 DIAGNOSIS — C64.9 CARCINOMA OF KIDNEY, UNSPECIFIED LATERALITY (HCC): ICD-10-CM

## 2018-11-20 DIAGNOSIS — C64.9 CARCINOMA OF KIDNEY, UNSPECIFIED LATERALITY (HCC): Primary | ICD-10-CM

## 2018-11-20 LAB
ALBUMIN SERPL-MCNC: 4.5 G/DL (ref 3.4–4.8)
ALBUMIN/GLOB SERPL: 1.3 G/DL (ref 1.5–2.5)
ALP SERPL-CCNC: 88 U/L (ref 35–104)
ALT SERPL W P-5'-P-CCNC: 3 U/L (ref 10–36)
ANION GAP SERPL CALCULATED.3IONS-SCNC: 12.7 MMOL/L (ref 3.6–11.2)
AST SERPL-CCNC: 17 U/L (ref 10–30)
BASOPHILS # BLD AUTO: 0.03 10*3/MM3 (ref 0–0.3)
BASOPHILS NFR BLD AUTO: 0.6 % (ref 0–2)
BILIRUB SERPL-MCNC: 0.5 MG/DL (ref 0.2–1.8)
BUN BLD-MCNC: 19 MG/DL (ref 7–21)
BUN/CREAT SERPL: 14.6 (ref 7–25)
CALCIUM SPEC-SCNC: 9.7 MG/DL (ref 7.7–10)
CHLORIDE SERPL-SCNC: 106 MMOL/L (ref 99–112)
CO2 SERPL-SCNC: 19.3 MMOL/L (ref 24.3–31.9)
CREAT BLD-MCNC: 1.3 MG/DL (ref 0.43–1.29)
DEPRECATED RDW RBC AUTO: 54.4 FL (ref 37–54)
EOSINOPHIL # BLD AUTO: 0.03 10*3/MM3 (ref 0–0.7)
EOSINOPHIL NFR BLD AUTO: 0.6 % (ref 0–7)
ERYTHROCYTE [DISTWIDTH] IN BLOOD BY AUTOMATED COUNT: 15.3 % (ref 11.5–14.5)
GFR SERPL CREATININE-BSD FRML MDRD: 50 ML/MIN/1.73
GLOBULIN UR ELPH-MCNC: 3.5 GM/DL
GLUCOSE BLD-MCNC: 106 MG/DL (ref 70–110)
HCT VFR BLD AUTO: 40.4 % (ref 37–47)
HGB BLD-MCNC: 13 G/DL (ref 12–16)
IMM GRANULOCYTES # BLD: 0.01 10*3/MM3 (ref 0–0.03)
IMM GRANULOCYTES NFR BLD: 0.2 % (ref 0–0.5)
LYMPHOCYTES # BLD AUTO: 2.04 10*3/MM3 (ref 1–3)
LYMPHOCYTES NFR BLD AUTO: 38.3 % (ref 16–46)
MCH RBC QN AUTO: 31.2 PG (ref 27–33)
MCHC RBC AUTO-ENTMCNC: 32.2 G/DL (ref 33–37)
MCV RBC AUTO: 96.9 FL (ref 80–94)
MONOCYTES # BLD AUTO: 0.55 10*3/MM3 (ref 0.1–0.9)
MONOCYTES NFR BLD AUTO: 10.3 % (ref 0–12)
NEUTROPHILS # BLD AUTO: 2.67 10*3/MM3 (ref 1.4–6.5)
NEUTROPHILS NFR BLD AUTO: 50 % (ref 40–75)
OSMOLALITY SERPL CALC.SUM OF ELEC: 278.4 MOSM/KG (ref 273–305)
PLATELET # BLD AUTO: 243 10*3/MM3 (ref 130–400)
PMV BLD AUTO: 11.5 FL (ref 6–10)
POTASSIUM BLD-SCNC: 4.1 MMOL/L (ref 3.5–5.3)
PROT SERPL-MCNC: 8 G/DL (ref 6–8)
RBC # BLD AUTO: 4.17 10*6/MM3 (ref 4.2–5.4)
SODIUM BLD-SCNC: 138 MMOL/L (ref 135–153)
WBC NRBC COR # BLD: 5.33 10*3/MM3 (ref 4.5–12.5)

## 2018-11-20 PROCEDURE — 71250 CT THORAX DX C-: CPT | Performed by: RADIOLOGY

## 2018-11-20 PROCEDURE — 80053 COMPREHEN METABOLIC PANEL: CPT | Performed by: INTERNAL MEDICINE

## 2018-11-20 PROCEDURE — 71250 CT THORAX DX C-: CPT

## 2018-11-20 PROCEDURE — 74176 CT ABD & PELVIS W/O CONTRAST: CPT

## 2018-11-20 PROCEDURE — 85025 COMPLETE CBC W/AUTO DIFF WBC: CPT | Performed by: INTERNAL MEDICINE

## 2018-11-20 PROCEDURE — 99214 OFFICE O/P EST MOD 30 MIN: CPT | Performed by: INTERNAL MEDICINE

## 2018-11-20 PROCEDURE — 74176 CT ABD & PELVIS W/O CONTRAST: CPT | Performed by: RADIOLOGY

## 2018-11-20 RX ORDER — OXYCODONE AND ACETAMINOPHEN 7.5; 325 MG/1; MG/1
1 TABLET ORAL EVERY 6 HOURS PRN
Qty: 120 TABLET | Refills: 0 | Status: SHIPPED | OUTPATIENT
Start: 2018-11-20 | End: 2018-12-26 | Stop reason: SDUPTHER

## 2018-11-20 NOTE — PROGRESS NOTES
"  Name:  Nancy Berman  :  1950  Date:  2018     REFERRING PROVIDER  ANGELA Clark    PRIMARY CARE PROVIDER  Julee Arechiga APRN    REASON FOR FOLLOWUP  1. Carcinoma of kidney, unspecified laterality (CMS/HCC)      CHIEF COMPLAINT  Slightly increased back pain compared to earlier this year, currently still manageable with prn Percocet.    Dear Ms. Lerner,    HISTORY OF PRESENT ILLNESS:   I saw Ms. Berman in follow up today in our medical oncology clinic. As you are aware, she is a pleasant, 68 y.o.,  female with a history of hypertension, diabetes and chronic kidney disease who was initially diagnosed with, and underwent an uncomplicated resection of, a left-sided kidney cancer in . Adjuvant therapy was not indicated. Ultimately, she had been doing very well from the standpoint of this disease ever since then (for the past ten years). However, in early summer 2018, as part of a routine follow up evaluation for her chronic kidney disease (of her remaining, right kidney), you ordered a renal ultrasound. Unexpectedly, this study identified a retroperitoneal mass; and subsequent CT scans (performed in late 2018) showed \"nodular foci in the retroperitoneum\", a \"heterogeneous lesion inferior in the right lobe of the liver\" and \"small, tiny peripheral densities in the lungs most noticeable on the right\", consistent with more widespread and metastatic disease. She underwent a CT-guided biopsy of a retroperitoneal mass at  on 2018, and the pathology results were consistent with recurrent, renal cell carcinoma. She was subsequently referred to our clinic for further evaluation and management.    INTERIM HISTORY:  Ms. Berman returns to clinic today for follow up accompanied by one of her daughters. She has a history of longstanding back pain (which, for years, has had a tendency to worsen when she stands up to wash dishes and forces her to sit down " periodically to rest) and fatigue, both of which may have slowly been getting a little worse recently. Her back pain remains well controlled on her current schedule of prn Percocet. For the most part, she does not feel much different now than she has for years. She again has no new or other specific complaints today.    Past Medical History:   Diagnosis Date   • Arthritis    • Cancer (CMS/HCC)     left Kidney   • Chronic kidney disease, stage III (moderate) (CMS/HCC)    • Diabetes mellitus (CMS/HCC)    • High cholesterol    • Hypertension    • Low back pain    • Spinal stenosis        Past Surgical History:   Procedure Laterality Date   • BLADDER SURGERY     • HYSTERECTOMY     • KIDNEY SURGERY Left     Nephrectomy for cancerous mass       Social History     Socioeconomic History   • Marital status: Legally      Spouse name: Not on file   • Number of children: Not on file   • Years of education: Not on file   • Highest education level: Not on file   Social Needs   • Financial resource strain: Not on file   • Food insecurity - worry: Not on file   • Food insecurity - inability: Not on file   • Transportation needs - medical: Not on file   • Transportation needs - non-medical: Not on file   Occupational History   • Not on file   Tobacco Use   • Smoking status: Never Smoker   • Smokeless tobacco: Never Used   Substance and Sexual Activity   • Alcohol use: Yes     Comment: social   • Drug use: Defer   • Sexual activity: Defer   Other Topics Concern   • Not on file   Social History Narrative   • Not on file       Family History   Problem Relation Age of Onset   • Parkinsonism Mother    • Heart disease Father    • Diabetes Father    • Cancer Brother        Allergies   Allergen Reactions   • Sulfa Antibiotics        Current Outpatient Medications   Medication Sig Dispense Refill   • aspirin 325 MG tablet Take 325 mg by mouth daily.     • gabapentin (NEURONTIN) 600 MG tablet Take 600 mg by mouth 3 (three) times a  day.     • metFORMIN XR (GLUCOPHAGE-XR) 500 MG 24 hr tablet Take 500 mg by mouth daily with breakfast.     • oxyCODONE-acetaminophen (PERCOCET) 7.5-325 MG per tablet Take 1 tablet by mouth Every 6 (Six) Hours As Needed for Moderate Pain . 120 tablet 0     No current facility-administered medications for this visit.      REVIEW OF SYSTEMS  CONSTITUTIONAL:  No fever, chills or night sweats. Chronic fatigue, stable.  EYES:  No blurry vision, diplopia or other vision changes.  ENT:  No hearing loss, nosebleeds or sore throat.  CARDIOVASCULAR:  No palpitations, arrhythmia, syncopal episodes or edema.  PULMONARY:  No hemoptysis, wheezing, chronic cough or shortness of breath.  GASTROINTESTINAL:  No nausea or vomiting.  No constipation or diarrhea. No abdominal pain.  GENITOURINARY:  No hematuria, kidney stones or frequent urination.  MUSCULOSKELETAL:  Chronic back pain, radiating slightly into her right flank, currently still well controlled, as per the HPI above.  INTEGUMENTARY: No rashes or pruritus.  ENDOCRINE:  No excessive thirst or hot flashes.  HEMATOLOGIC:  No history of free bleeding, spontaneous bleeding or clotting.  IMMUNOLOGIC:  No allergies or frequent infections.  NEUROLOGIC: No numbness, tingling, seizures or weakness.  PSYCHIATRIC:  Some increased anxiety over her recent diagnosis with recurrent, metastatic cancer, recently better.    PHYSICAL EXAMINATION  /95   Pulse 83   Temp 97.5 °F (36.4 °C) (Oral)   Resp 18   Wt 81.2 kg (179 lb)   SpO2 98%   BMI 29.79 kg/m²     GENERAL:  A well-developed, well-nourished, elderly,  female in no acute distress.  HEENT:  Pupils equally round and reactive to light.  Extraocular muscles intact.  CARDIOVASCULAR:  Regular rate and rhythm.  No murmurs, gallops or rubs.  LUNGS:  Clear to auscultation bilaterally.  ABDOMEN:  Soft, nontender, nondistended with positive bowel sounds.  EXTREMITIES:  No clubbing, cyanosis or edema bilaterally.  SKIN:   No rashes or petechiae.  NEURO:  Cranial nerves grossly intact.  No focal deficits.  PSYCH:  Alert and oriented x3.    LABORATORY    Lab Results   Component Value Date    WBC 5.33 11/20/2018    HGB 13.0 11/20/2018    HCT 40.4 11/20/2018    MCV 96.9 (H) 11/20/2018     11/20/2018    NEUTROABS 2.67 11/20/2018       Lab Results   Component Value Date     (L) 04/22/2017    K 3.8 04/22/2017     04/22/2017    CO2 24.3 04/22/2017    BUN 12 04/22/2017    CREATININE 1.05 04/22/2017    GLUCOSE 147 (H) 04/22/2017    CALCIUM 8.9 04/22/2017    AST 48 (H) 04/22/2017    ALT 16 04/22/2017    ALKPHOS 86 04/22/2017    BILITOT 0.7 04/22/2017    PROTEINTOT 6.8 04/22/2017    ALBUMIN 3.90 04/22/2017     CBC (11/20/2018): WBCs: 5.3; HgB: 13.0; Hct: 40.4; platelets: 243; MCV: 96.9  CBC (08/23/2018): WBCs: 5.7; HgB: 13.8; Hct: 43.4; platelets: 248    IMAGING  CT chest, abdomen and pelvis with contrast (06/30/2018, at ):  Impression:  1) Small, tiny peripheral densities in the lungs most noticeable on the right.  2) Heterogeneous lesion inferior in the right lobe of the liver.  3) Heterogeneous enhancing nodular foci in the retroperitoneum.  4) Vascular calcifications with moderate coronary artery disease.  5) Moderate degenerative changes in the spine with moderate spinal canal stenosis.    CT chest without contrast (08/16/2018):  Impression: Negative CT scan of the thorax. Nothing is seen to suggest metastatic disease in the chest.    CT abdomen and pelvis without contrast (08/16/2018):  Impression: Patient status post right nephrectomy. There is a low density mass in the retroperitoneum that would be consistent with metastatic disease to the retroperitoneal lymph nodes possibly involving the inferior vena cava. It measures approximately 3.8 cm in diameter.    NM bone scan, whole body (09/27/2018):  Impression: There are no findings to suggest metastatic disease or to explain the patient's symptoms.    CT chest without  contrast (11/20/2018):  Impression: Stable CT scan of the thorax. Nothing seen to suggest metastatic disease. There is fairly heavy coronary artery calcification. There were no pulmonary parenchymal lung nodules or pleural effusions. No enlarged lymph nodes were demonstrated.    CT abdomen and pelvis without contrast (11/20/2018):  Impression:  Patient status post right nephrectomy. There continues to be some abnormal soft tissue density in the retroperitoneum in the pericaval area concerning for retroperitoneal lymphadenopathy. This is completely unchanged, however, from 08/2018 exam.    PATHOLOGY  Right retroperitoneal mass (07/12/2018):  Positive for malignancy; consistent with recurrent renal cell carcinoma. PAX8 is strongly positive in tumor cells, and Inhibin is negative.    Right kidney, radical nephrectomy (11/05/2008, per pathology report; outside slides):  Renal cell carcinoma, clear cell type. Unremarkable adrenal gland.    IMPRESSION AND PLAN  Ms. Berman is a 68 y.o.,  female with:  1. Renal cell carcinoma: Initially diagnosed in 2008 and status post a left-sided nephrectomy, with no signs/symptoms of recurrent disease for the next ten years. Unfortunately, as of June 2018, this was/is no longer the case. CT scans performed at  on 06/30/2018 identified multiple foci in the retroperitoneum (along with possible lung lesions), consistent with (and CT-guided FNA confirmed) recurrent, and now metastatic, disease. I have had several long discussions with the patient and her daughters since the time of her initial presentation in our clinic (on 07/25/2018) regarding this diagnosis and, in general terms, its prognosis. In short, they remain aware that this disease is no longer curable; but that, particularly given her good performance status, it is treatable. While there are multiple, systemic options available to us (including PO TKIs, such as pazopanib, and IV immunotherapy, such as  qmonthly nivolumab), it was (and still is) also reasonable to defer starting any specific treatment immediately. Renal cell carcinoma is typically slow growing, and this appears to especially be the case here. She did not relapse for ten years, the CT scans performed in late June 2018 at  showed a relatively low, tumor burden (and this continues to be the case on the most recent repeat imaging, with extremely stable disease seen on the CT scans performed on 11/20/2018 and the NM bone scan performed on 09/27/2018, all summarized above; the latter shows no evidence of skeletal mets); and she was (and remains) minimally (if at all) symptomatic. It remains agreed that we will continue to expectantly monitor her disease for clear signs of significant progression at this time. In general, the longer an elderly patient with multiple comobidities can avoid the potential side effects of palliative treatment, the better. We will see her back in clinic in three months with a repeat CBC, CMP and CT scans. The patient is aware to notify us between now and then if she develops new or worsening symptoms, such as shortness of breath or increasing back pain, as starting treatment (PO pazopanib will likely be offered first) can be reconsidered at any time.  2. Back pain: A chronic issue which the retroperitoneal foci from issue #1 may be exacerbating. Our clinic has agreed to provide her with her pain medicine from this point. Prn Mcarthur had been minimally effective, but Percocet 7.5/325 mg q6hr prn has been beneficial. A refill of the latter was provided today. Continue to monitor.  The patient and her daughter were in agreement with these plans.    [facility] Quality Measures:  a) The patient does not currently use tobacco products (she quit smoking a long time ago).  b) The patient's BMI is above normal parameters. Plan includes a referral to primary care.  c) The current outpatient and discharge medications have been reconciled for the  patient.    It is a pleasure to participate in Ms. Berman's care. Please do not hesitate to call with any questions or concerns that you may have.    A total of 30 minutes were spent coordinating this patient’s care in clinic today; more than 50% of this time was face-to-face with the patient and her daughter, reviewing her interim medical history, discussing the results of today's repeat CT scans and counseling on the current treatment and followup plan. All questions were answered to their satisfaction.    FOLLOW UP  Refill of Percocet 7.5 mg PO q6hr prn disp #60 provided today. Return to clinic in 3 months with a CBC, CMP and CTs of the chest, abdomen and pelvis without contrast.        This document was electronically signed by GABY Graham MD November 20, 2018 2:38 PM      CC: ANGELA Clark

## 2018-12-26 RX ORDER — OXYCODONE AND ACETAMINOPHEN 7.5; 325 MG/1; MG/1
1 TABLET ORAL EVERY 6 HOURS PRN
Qty: 120 TABLET | Refills: 0 | Status: SHIPPED | OUTPATIENT
Start: 2018-12-26 | End: 2019-02-08 | Stop reason: SDUPTHER

## 2019-02-08 RX ORDER — OXYCODONE AND ACETAMINOPHEN 7.5; 325 MG/1; MG/1
1 TABLET ORAL EVERY 6 HOURS PRN
Qty: 120 TABLET | Refills: 0 | Status: SHIPPED | OUTPATIENT
Start: 2019-02-08 | End: 2019-03-05 | Stop reason: SDUPTHER

## 2019-02-22 ENCOUNTER — HOSPITAL ENCOUNTER (OUTPATIENT)
Dept: CT IMAGING | Facility: HOSPITAL | Age: 69
Discharge: HOME | End: 2019-02-22
Admitting: INTERNAL MEDICINE

## 2019-02-22 DIAGNOSIS — C64.9 CARCINOMA OF KIDNEY, UNSPECIFIED LATERALITY (HCC): ICD-10-CM

## 2019-02-22 PROCEDURE — 74176 CT ABD & PELVIS W/O CONTRAST: CPT | Performed by: RADIOLOGY

## 2019-02-22 PROCEDURE — 74176 CT ABD & PELVIS W/O CONTRAST: CPT

## 2019-03-05 ENCOUNTER — OFFICE VISIT (OUTPATIENT)
Dept: ONCOLOGY | Facility: CLINIC | Age: 69
End: 2019-03-05

## 2019-03-05 ENCOUNTER — LAB (OUTPATIENT)
Dept: ONCOLOGY | Facility: CLINIC | Age: 69
End: 2019-03-05

## 2019-03-05 VITALS
RESPIRATION RATE: 20 BRPM | TEMPERATURE: 98.1 F | SYSTOLIC BLOOD PRESSURE: 114 MMHG | HEART RATE: 93 BPM | BODY MASS INDEX: 29.64 KG/M2 | WEIGHT: 178.1 LBS | OXYGEN SATURATION: 97 % | DIASTOLIC BLOOD PRESSURE: 82 MMHG

## 2019-03-05 DIAGNOSIS — C64.9 CARCINOMA OF KIDNEY, UNSPECIFIED LATERALITY (HCC): Primary | ICD-10-CM

## 2019-03-05 DIAGNOSIS — C64.9 CARCINOMA OF KIDNEY, UNSPECIFIED LATERALITY (HCC): ICD-10-CM

## 2019-03-05 LAB
ALBUMIN SERPL-MCNC: 4.7 G/DL (ref 3.4–4.8)
ALBUMIN/GLOB SERPL: 1.3 G/DL (ref 1.5–2.5)
ALP SERPL-CCNC: 95 U/L (ref 35–104)
ALT SERPL W P-5'-P-CCNC: 5 U/L (ref 10–36)
ANION GAP SERPL CALCULATED.3IONS-SCNC: 8.7 MMOL/L (ref 3.6–11.2)
AST SERPL-CCNC: 17 U/L (ref 10–30)
BASOPHILS # BLD AUTO: 0.04 10*3/MM3 (ref 0–0.3)
BASOPHILS NFR BLD AUTO: 0.7 % (ref 0–2)
BILIRUB SERPL-MCNC: 0.5 MG/DL (ref 0.2–1.8)
BUN BLD-MCNC: 21 MG/DL (ref 7–21)
BUN/CREAT SERPL: 16 (ref 7–25)
CALCIUM SPEC-SCNC: 9.8 MG/DL (ref 7.7–10)
CHLORIDE SERPL-SCNC: 108 MMOL/L (ref 99–112)
CO2 SERPL-SCNC: 20.3 MMOL/L (ref 24.3–31.9)
CREAT BLD-MCNC: 1.31 MG/DL (ref 0.43–1.29)
DEPRECATED RDW RBC AUTO: 51.8 FL (ref 37–54)
EOSINOPHIL # BLD AUTO: 0.04 10*3/MM3 (ref 0–0.7)
EOSINOPHIL NFR BLD AUTO: 0.7 % (ref 0–7)
ERYTHROCYTE [DISTWIDTH] IN BLOOD BY AUTOMATED COUNT: 14.6 % (ref 11.5–14.5)
GFR SERPL CREATININE-BSD FRML MDRD: 49 ML/MIN/1.73
GLOBULIN UR ELPH-MCNC: 3.5 GM/DL
GLUCOSE BLD-MCNC: 124 MG/DL (ref 70–110)
HCT VFR BLD AUTO: 41.4 % (ref 37–47)
HGB BLD-MCNC: 13.6 G/DL (ref 12–16)
IMM GRANULOCYTES # BLD AUTO: 0.01 10*3/MM3 (ref 0–0.03)
IMM GRANULOCYTES NFR BLD AUTO: 0.2 % (ref 0–0.5)
LYMPHOCYTES # BLD AUTO: 2.76 10*3/MM3 (ref 1–3)
LYMPHOCYTES NFR BLD AUTO: 46.1 % (ref 16–46)
MCH RBC QN AUTO: 32.5 PG (ref 27–33)
MCHC RBC AUTO-ENTMCNC: 32.9 G/DL (ref 33–37)
MCV RBC AUTO: 99 FL (ref 80–94)
MONOCYTES # BLD AUTO: 0.3 10*3/MM3 (ref 0.1–0.9)
MONOCYTES NFR BLD AUTO: 5 % (ref 0–12)
NEUTROPHILS # BLD AUTO: 2.84 10*3/MM3 (ref 1.4–6.5)
NEUTROPHILS NFR BLD AUTO: 47.3 % (ref 40–75)
OSMOLALITY SERPL CALC.SUM OF ELEC: 278.2 MOSM/KG (ref 273–305)
PLATELET # BLD AUTO: 264 10*3/MM3 (ref 130–400)
PMV BLD AUTO: 11 FL (ref 6–10)
POTASSIUM BLD-SCNC: 4 MMOL/L (ref 3.5–5.3)
PROT SERPL-MCNC: 8.2 G/DL (ref 6–8)
RBC # BLD AUTO: 4.18 10*6/MM3 (ref 4.2–5.4)
SODIUM BLD-SCNC: 137 MMOL/L (ref 135–153)
WBC NRBC COR # BLD: 5.99 10*3/MM3 (ref 4.5–12.5)

## 2019-03-05 PROCEDURE — 99214 OFFICE O/P EST MOD 30 MIN: CPT | Performed by: INTERNAL MEDICINE

## 2019-03-05 PROCEDURE — 85025 COMPLETE CBC W/AUTO DIFF WBC: CPT | Performed by: INTERNAL MEDICINE

## 2019-03-05 PROCEDURE — 36415 COLL VENOUS BLD VENIPUNCTURE: CPT

## 2019-03-05 PROCEDURE — 80053 COMPREHEN METABOLIC PANEL: CPT | Performed by: INTERNAL MEDICINE

## 2019-03-05 RX ORDER — CARBIDOPA/LEVODOPA 25MG-250MG
1 TABLET ORAL 3 TIMES DAILY
COMMUNITY

## 2019-03-05 RX ORDER — OXYCODONE AND ACETAMINOPHEN 7.5; 325 MG/1; MG/1
1 TABLET ORAL EVERY 6 HOURS PRN
Qty: 120 TABLET | Refills: 0 | Status: SHIPPED | OUTPATIENT
Start: 2019-03-05 | End: 2019-05-08 | Stop reason: SDUPTHER

## 2019-03-05 RX ORDER — AMLODIPINE BESYLATE 5 MG/1
5 TABLET ORAL DAILY
COMMUNITY

## 2019-03-05 NOTE — PROGRESS NOTES
"  Name:  Nancy Berman  :  1950  Date:  3/5/2019     REFERRING PROVIDER  ANGELA Clark    PRIMARY CARE PROVIDER  Julee Arechiga APRN    REASON FOR FOLLOWUP  1. Carcinoma of kidney, unspecified laterality (CMS/HCC)      CHIEF COMPLAINT  Increased back pain compared to last year, currently still manageable with prn Percocet.    Dear Ms. Michelsnut,    HISTORY OF PRESENT ILLNESS:   I saw Ms. Berman in follow up today in our medical oncology clinic. As you are aware, she is a pleasant, 69 y.o.,  female with a history of hypertension, diabetes and chronic kidney disease who was initially diagnosed with, and underwent an uncomplicated resection of, a left-sided kidney cancer in . Adjuvant therapy was not indicated. Ultimately, she had been doing very well from the standpoint of this disease ever since then (for the past ten years). However, in early summer 2018, as part of a routine follow up evaluation for her chronic kidney disease (of her remaining, right kidney), you ordered a renal ultrasound. Unexpectedly, this study identified a retroperitoneal mass; and subsequent CT scans (performed in late 2018) showed \"nodular foci in the retroperitoneum\", a \"heterogeneous lesion inferior in the right lobe of the liver\" and \"small, tiny peripheral densities in the lungs most noticeable on the right\", consistent with more widespread and metastatic disease. She underwent a CT-guided biopsy of a retroperitoneal mass at  on 2018, and the pathology results were consistent with recurrent, renal cell carcinoma. She was subsequently referred to our clinic for further evaluation and management.    INTERIM HISTORY:  Ms. Berman returns to clinic today for follow up by herself. She has a history of longstanding back pain (which, for years, has had a tendency to worsen when she stands up to wash dishes and forces her to sit down periodically to rest) and fatigue, both of which " may have slowly been getting a little worse recently. Her back pain remains controlled on her current schedule of prn Percocet. For the most part, she does not feel much different now than she has for years. She again has no new or other specific complaints today.    Past Medical History:   Diagnosis Date   • Arthritis    • Cancer (CMS/HCC)     left Kidney   • Chronic kidney disease, stage III (moderate) (CMS/HCC)    • Diabetes mellitus (CMS/HCC)    • High cholesterol    • Hypertension    • Low back pain    • Spinal stenosis        Past Surgical History:   Procedure Laterality Date   • BLADDER SURGERY     • HYSTERECTOMY     • KIDNEY SURGERY Left     Nephrectomy for cancerous mass   • PA OPEN BHAVANA FIXATN HUMERAL SHAFT FX Left 4/20/2017    Procedure: LEFT HUMERUS INTRAMEDULLARY NAIL/BHAVANA INSERTION;  Surgeon: Jose Willard MD;  Location: [facility];  Service: Orthopedics       Social History     Socioeconomic History   • Marital status: Legally      Spouse name: Not on file   • Number of children: Not on file   • Years of education: Not on file   • Highest education level: Not on file   Social Needs   • Financial resource strain: Not on file   • Food insecurity - worry: Not on file   • Food insecurity - inability: Not on file   • Transportation needs - medical: Not on file   • Transportation needs - non-medical: Not on file   Occupational History   • Not on file   Tobacco Use   • Smoking status: Never Smoker   • Smokeless tobacco: Never Used   Substance and Sexual Activity   • Alcohol use: Yes     Comment: social   • Drug use: Defer   • Sexual activity: Defer   Other Topics Concern   • Not on file   Social History Narrative   • Not on file       Family History   Problem Relation Age of Onset   • Parkinsonism Mother    • Heart disease Father    • Diabetes Father    • Cancer Brother        Allergies   Allergen Reactions   • Sulfa Antibiotics        Current Outpatient Medications   Medication Sig Dispense  Refill   • amLODIPine (NORVASC) 5 MG tablet Take 5 mg by mouth Daily.     • aspirin 325 MG tablet Take 325 mg by mouth daily.     • carbidopa-levodopa (SINEMET)  MG per tablet Take 1 tablet by mouth 3 (Three) Times a Day.     • gabapentin (NEURONTIN) 600 MG tablet Take 600 mg by mouth 3 (three) times a day.     • metFORMIN XR (GLUCOPHAGE-XR) 500 MG 24 hr tablet Take 500 mg by mouth daily with breakfast.     • oxyCODONE-acetaminophen (PERCOCET) 7.5-325 MG per tablet Take 1 tablet by mouth Every 6 (Six) Hours As Needed for Moderate Pain . 120 tablet 0     No current facility-administered medications for this visit.      REVIEW OF SYSTEMS  CONSTITUTIONAL:  No fever, chills or night sweats. Chronic fatigue, stable.  EYES:  No blurry vision, diplopia or other vision changes.  ENT:  No hearing loss, nosebleeds or sore throat.  CARDIOVASCULAR:  No palpitations, arrhythmia, syncopal episodes or edema.  PULMONARY:  No hemoptysis, wheezing, chronic cough or shortness of breath.  GASTROINTESTINAL:  No nausea or vomiting.  No constipation or diarrhea. No abdominal pain.  GENITOURINARY:  No hematuria, kidney stones or frequent urination.  MUSCULOSKELETAL:  Chronic back pain, radiating slightly into her right flank, currently still well controlled, as per the HPI above.  INTEGUMENTARY: No rashes or pruritus.  ENDOCRINE:  No excessive thirst or hot flashes.  HEMATOLOGIC:  No history of free bleeding, spontaneous bleeding or clotting.  IMMUNOLOGIC:  No allergies or frequent infections.  NEUROLOGIC: No numbness, tingling, seizures or weakness.  PSYCHIATRIC:  Some increased anxiety over her recent diagnosis with recurrent, metastatic cancer, recently better.    PHYSICAL EXAMINATION  /82   Pulse 93   Temp 98.1 °F (36.7 °C) (Oral)   Resp 20   Wt 80.8 kg (178 lb 1.6 oz)   SpO2 97%   BMI 29.64 kg/m²     GENERAL:  A well-developed, well-nourished, elderly,  female in no acute distress.  HEENT:  Pupils  equally round and reactive to light.  Extraocular muscles intact.  CARDIOVASCULAR:  Regular rate and rhythm.  No murmurs, gallops or rubs.  LUNGS:  Clear to auscultation bilaterally.  ABDOMEN:  Soft, nontender, nondistended with positive bowel sounds.  EXTREMITIES:  No clubbing, cyanosis or edema bilaterally.  SKIN:  No rashes or petechiae.  NEURO:  Cranial nerves grossly intact.  No focal deficits.  PSYCH:  Alert and oriented x3.    LABORATORY    Lab Results   Component Value Date    WBC 5.99 03/05/2019    HGB 13.6 03/05/2019    HCT 41.4 03/05/2019    MCV 99.0 (H) 03/05/2019     03/05/2019    NEUTROABS 2.84 03/05/2019       Lab Results   Component Value Date     11/20/2018    K 4.1 11/20/2018     11/20/2018    CO2 19.3 (L) 11/20/2018    BUN 19 11/20/2018    CREATININE 1.30 (H) 11/20/2018    GLUCOSE 106 11/20/2018    CALCIUM 9.7 11/20/2018    AST 17 11/20/2018    ALT 3 (L) 11/20/2018    ALKPHOS 88 11/20/2018    BILITOT 0.5 11/20/2018    PROTEINTOT 8.0 11/20/2018    ALBUMIN 4.50 11/20/2018     CBC (03/05/2019): WBCs: 5.9; HgB: 13.6; Hct: 41.4; platelets: 264; MCV: 99.0  CBC (11/20/2018): WBCs: 5.3; HgB: 13.0; Hct: 40.4; platelets: 243; MCV: 96.9  CBC (08/23/2018): WBCs: 5.7; HgB: 13.8; Hct: 43.4; platelets: 248    IMAGING  CT chest, abdomen and pelvis with contrast (06/30/2018, at ):  Impression:  1) Small, tiny peripheral densities in the lungs most noticeable on the right.  2) Heterogeneous lesion inferior in the right lobe of the liver.  3) Heterogeneous enhancing nodular foci in the retroperitoneum.  4) Vascular calcifications with moderate coronary artery disease.  5) Moderate degenerative changes in the spine with moderate spinal canal stenosis.    CT chest without contrast (08/16/2018):  Impression: Negative CT scan of the thorax. Nothing is seen to suggest metastatic disease in the chest.    CT abdomen and pelvis without contrast (08/16/2018):  Impression: Patient status post right  nephrectomy. There is a low density mass in the retroperitoneum that would be consistent with metastatic disease to the retroperitoneal lymph nodes possibly involving the inferior vena cava. It measures approximately 3.8 cm in diameter.    NM bone scan, whole body (09/27/2018):  Impression: There are no findings to suggest metastatic disease or to explain the patient's symptoms.    CT chest without contrast (11/20/2018):  Impression: Stable CT scan of the thorax. Nothing seen to suggest metastatic disease. There is fairly heavy coronary artery calcification. There were no pulmonary parenchymal lung nodules or pleural effusions. No enlarged lymph nodes were demonstrated.    CT abdomen and pelvis without contrast (11/20/2018):  Impression:  Patient status post right nephrectomy. There continues to be some abnormal soft tissue density in the retroperitoneum in the pericaval area concerning for retroperitoneal lymphadenopathy. This is completely unchanged, however, from 08/2018 exam.    CT abdomen and pelvis without contrast (03/05/2019):  Impression: Stable appearance of the abdomen including soft tissue density mass in the retroperitoneal region. There continues to be some abnormal nearly 7 cm soft tissue density in the retroperitoneum in the pericaval area that remains stable.    PATHOLOGY  Right retroperitoneal mass (07/12/2018):  Positive for malignancy; consistent with recurrent renal cell carcinoma. PAX8 is strongly positive in tumor cells, and Inhibin is negative.    Right kidney, radical nephrectomy (11/05/2008, per pathology report; outside slides):  Renal cell carcinoma, clear cell type. Unremarkable adrenal gland.    IMPRESSION AND PLAN  Ms. Berman is a 69 y.o.,  female with:  1. Renal cell carcinoma: Initially diagnosed in 2008 and status post a left-sided nephrectomy, with no signs/symptoms of recurrent disease for the next ten years. Unfortunately, as of June 2018, this was/is no longer  the case. CT scans performed at  on 06/30/2018 identified multiple foci in the retroperitoneum (along with possible lung lesions; although lung imaging since then has been less concerning), consistent with (and CT-guided FNA confirmed) recurrent, and now metastatic, disease. I have had several long discussions with the patient and her daughters since the time of her initial presentation in our clinic (on 07/25/2018) regarding this diagnosis and, in general terms, its prognosis. In short, they remain aware that this disease is likely no longer curable; but that, particularly given her good performance status, it is treatable. While there are have always been multiple, systemic options available to us (including PO TKIs, such as pazopanib, and IV immunotherapy, such as qmonthly nivolumab), so far, it has been reasonable to defer starting any specific, systemic treatment immediately. Renal cell carcinoma is typically slow growing, and this appears to especially be the case here. She did not relapse for ten years, the CT scans performed in late June 2018 at  showed a low, tumor burden (and this continues to be the case on the most recent repeat imaging, with extremely stable disease seen on the CT scans performed on both 02/22/2019 and 11/20/2018 and the NM bone scan performed on 09/27/2018, all summarized above; the latter showed no evidence of skeletal mets); and she was (and remains) minimally (if at all) symptomatic. In general, the longer an elderly patient with multiple comobidities can avoid the potential side effects of palliative treatment, the better. However, at this time, given her very stable, oligometastatic disease, it now seems worthwhile sending her back to urology for reevaluation (to consider resecting the retroperitoneal, soft tissue mass?). We will see her back in clinic in six weeks with a CBC and CMP.  2. Back pain: A chronic issue which the retroperitoneal foci from issue #1 may be  exacerbating. Our clinic has agreed to provide her with her pain medicine. Prn Norco had been minimally effective, but Percocet 7.5/325 mg q6hr prn has been beneficial. A refill of the latter was provided today. Continue to monitor.  The patient was in agreement with these plans.    [facility] Quality Measures:  a) The patient does not currently use tobacco products (she quit smoking a long time ago).  b) The patient's BMI is above normal parameters. Plan includes a referral to primary care.  c) The current outpatient and discharge medications have been reconciled for the patient.    It is a pleasure to participate in Ms. Berman's care. Please do not hesitate to call with any questions or concerns that you may have.    A total of 30 minutes were spent coordinating this patient’s care in clinic today; more than 50% of this time was face-to-face with the patient, reviewing her interim medical history, discussing the results of the recent repeat CT scans and counseling on the current evaluation, treatment and followup plan. All questions were answered to her satisfaction.    FOLLOW UP  Refill of Percocet 7.5 mg PO q6hr prn disp #60 provided today. Referral made to [facility] to consider resection of stable, retroperitoneal mass. Return to our clinic in 6 weeks with a CBC and CMP.        This document was electronically signed by GABY Graham MD March 5, 2019 3:47 PM      CC: ANGELA Clark MD

## 2019-04-15 ENCOUNTER — TRANSCRIBE ORDERS (OUTPATIENT)
Dept: ADMINISTRATIVE | Facility: HOSPITAL | Age: 69
End: 2019-04-15

## 2019-04-15 DIAGNOSIS — C64.2 KIDNEY CANCER, PRIMARY, WITH METASTASIS FROM KIDNEY TO OTHER SITE, LEFT (HCC): Primary | ICD-10-CM

## 2019-04-25 ENCOUNTER — APPOINTMENT (OUTPATIENT)
Dept: CT IMAGING | Facility: HOSPITAL | Age: 69
End: 2019-04-25

## 2019-05-08 RX ORDER — OXYCODONE AND ACETAMINOPHEN 7.5; 325 MG/1; MG/1
1 TABLET ORAL EVERY 6 HOURS PRN
Qty: 120 TABLET | Refills: 0 | Status: SHIPPED | OUTPATIENT
Start: 2019-05-08 | End: 2019-06-12 | Stop reason: SDUPTHER

## 2019-05-08 NOTE — TELEPHONE ENCOUNTER
Patient called to onc clinic requesting refill on percocet Rx.  Dr. Graham approved refill; printed script made ready for pt  at reception window.  Called to patient to let her know Rx is ready; she stated she would be by to  in the morning tomorrow.

## 2019-05-21 ENCOUNTER — TELEPHONE (OUTPATIENT)
Dept: ONCOLOGY | Facility: HOSPITAL | Age: 69
End: 2019-05-21

## 2019-05-21 NOTE — TELEPHONE ENCOUNTER
----- Message from Linda Camara sent at 5/21/2019 10:37 AM EDT -----  Regarding: REFILL REQUEST  The patient would like a refill of Anastrozole sent to [facility] in [facility].

## 2019-05-21 NOTE — TELEPHONE ENCOUNTER
I have reviewed patient's charts. She isn't on this medicine. I believe this was an error. I did however call the patient to see if she needed anything or had called for a request of a medicine. She said she needs no refills at this time and hadn't called. I encouraged her to call back if she has any questions, problems, or concerns. She verbalized understanding.

## 2019-06-12 ENCOUNTER — TELEPHONE (OUTPATIENT)
Dept: ONCOLOGY | Facility: HOSPITAL | Age: 69
End: 2019-06-12

## 2019-06-12 RX ORDER — OXYCODONE AND ACETAMINOPHEN 7.5; 325 MG/1; MG/1
1 TABLET ORAL EVERY 6 HOURS PRN
Qty: 120 TABLET | Refills: 0 | Status: SHIPPED | OUTPATIENT
Start: 2019-06-12 | End: 2019-07-16 | Stop reason: SDUPTHER

## 2019-06-12 NOTE — TELEPHONE ENCOUNTER
----- Message from Linda Camara sent at 6/12/2019 10:28 AM EDT -----  Regarding: REFILL REQUEST  Contact: [phone]  The patient is requesting a refill of her pain meds

## 2019-06-12 NOTE — TELEPHONE ENCOUNTER
Rx ready for . Attempted to call pt. No answer. No voice mail activated. Left voice mail for Tia (noted she called to request Rx refill), pts emergency contact. Message given that Rx ready for  and reminder of follow up appt on 6/14.

## 2019-06-14 ENCOUNTER — OFFICE VISIT (OUTPATIENT)
Dept: ONCOLOGY | Facility: CLINIC | Age: 69
End: 2019-06-14

## 2019-06-14 VITALS
RESPIRATION RATE: 18 BRPM | HEART RATE: 105 BPM | DIASTOLIC BLOOD PRESSURE: 63 MMHG | OXYGEN SATURATION: 90 % | TEMPERATURE: 97.9 F | SYSTOLIC BLOOD PRESSURE: 84 MMHG

## 2019-06-14 DIAGNOSIS — C64.9 CARCINOMA OF KIDNEY, UNSPECIFIED LATERALITY (HCC): ICD-10-CM

## 2019-06-14 LAB
ALBUMIN SERPL-MCNC: 4.25 G/DL (ref 3.5–5.2)
ALBUMIN/GLOB SERPL: 1.2 G/DL
ALP SERPL-CCNC: 89 U/L (ref 39–117)
ALT SERPL W P-5'-P-CCNC: 17 U/L (ref 1–33)
ANION GAP SERPL CALCULATED.3IONS-SCNC: 16.4 MMOL/L
AST SERPL-CCNC: 22 U/L (ref 1–32)
BASOPHILS # BLD AUTO: 0.03 10*3/MM3 (ref 0–0.2)
BASOPHILS NFR BLD AUTO: 0.6 % (ref 0–1.5)
BILIRUB SERPL-MCNC: 0.4 MG/DL (ref 0.2–1.2)
BUN BLD-MCNC: 15 MG/DL (ref 8–23)
BUN/CREAT SERPL: 12.5 (ref 7–25)
CALCIUM SPEC-SCNC: 9.5 MG/DL (ref 8.6–10.5)
CHLORIDE SERPL-SCNC: 102 MMOL/L (ref 98–107)
CO2 SERPL-SCNC: 21.6 MMOL/L (ref 22–29)
CREAT BLD-MCNC: 1.2 MG/DL (ref 0.57–1)
DEPRECATED RDW RBC AUTO: 49 FL (ref 37–54)
EOSINOPHIL # BLD AUTO: 0.03 10*3/MM3 (ref 0–0.4)
EOSINOPHIL NFR BLD AUTO: 0.6 % (ref 0.3–6.2)
ERYTHROCYTE [DISTWIDTH] IN BLOOD BY AUTOMATED COUNT: 14.8 % (ref 12.3–15.4)
GFR SERPL CREATININE-BSD FRML MDRD: 54 ML/MIN/1.73
GLOBULIN UR ELPH-MCNC: 3.7 GM/DL
GLUCOSE BLD-MCNC: 186 MG/DL (ref 65–99)
HCT VFR BLD AUTO: 38.7 % (ref 34–46.6)
HGB BLD-MCNC: 12.7 G/DL (ref 12–15.9)
IMM GRANULOCYTES # BLD AUTO: 0.01 10*3/MM3 (ref 0–0.05)
IMM GRANULOCYTES NFR BLD AUTO: 0.2 % (ref 0–0.5)
LYMPHOCYTES # BLD AUTO: 1.49 10*3/MM3 (ref 0.7–3.1)
LYMPHOCYTES NFR BLD AUTO: 31.2 % (ref 19.6–45.3)
MCH RBC QN AUTO: 31.4 PG (ref 26.6–33)
MCHC RBC AUTO-ENTMCNC: 32.8 G/DL (ref 31.5–35.7)
MCV RBC AUTO: 95.8 FL (ref 79–97)
MONOCYTES # BLD AUTO: 0.34 10*3/MM3 (ref 0.1–0.9)
MONOCYTES NFR BLD AUTO: 7.1 % (ref 5–12)
NEUTROPHILS # BLD AUTO: 2.87 10*3/MM3 (ref 1.7–7)
NEUTROPHILS NFR BLD AUTO: 60.3 % (ref 42.7–76)
PLATELET # BLD AUTO: 252 10*3/MM3 (ref 140–450)
PMV BLD AUTO: 11.1 FL (ref 6–12)
POTASSIUM BLD-SCNC: 3.8 MMOL/L (ref 3.5–5.2)
PROT SERPL-MCNC: 7.9 G/DL (ref 6–8.5)
RBC # BLD AUTO: 4.04 10*6/MM3 (ref 3.77–5.28)
SODIUM BLD-SCNC: 140 MMOL/L (ref 136–145)
WBC NRBC COR # BLD: 4.77 10*3/MM3 (ref 3.4–10.8)

## 2019-06-14 PROCEDURE — 85025 COMPLETE CBC W/AUTO DIFF WBC: CPT | Performed by: INTERNAL MEDICINE

## 2019-06-14 PROCEDURE — 80053 COMPREHEN METABOLIC PANEL: CPT | Performed by: INTERNAL MEDICINE

## 2019-06-14 PROCEDURE — 99214 OFFICE O/P EST MOD 30 MIN: CPT | Performed by: INTERNAL MEDICINE

## 2019-06-14 NOTE — PROGRESS NOTES
"  Name:  Nancy Berman  :  1950  Date:  2019     REFERRING PROVIDER  ANGELA Clark    PRIMARY CARE PROVIDER  Julee Arechiga APRN    REASON FOR FOLLOWUP  1. Carcinoma of kidney, unspecified laterality (CMS/HCC)      CHIEF COMPLAINT  Increased back pain compared to last year, currently still manageable with prn Percocet (although she has been out of pain medicine for the past two weeks, as she and/or her family have had been unable to make the trip to our office to  a refill until today). Recent UTI symptoms (for which she is planning on starting an antibiotic today).    Dear Ms. Lerner,    HISTORY OF PRESENT ILLNESS:   I saw Ms. Berman in follow up today in our medical oncology clinic. As you are aware, she is a pleasant, 69 y.o.,  female with a history of hypertension, diabetes and chronic kidney disease who was initially diagnosed with, and underwent an uncomplicated resection of, a left-sided kidney cancer in . Adjuvant therapy was not indicated. Ultimately, she had been doing very well from the standpoint of this disease ever since then (for the past ten years). However, in early summer 2018, as part of a routine follow up evaluation for her chronic kidney disease (of her remaining, right kidney), you ordered a renal ultrasound. Unexpectedly, this study identified a retroperitoneal mass; and subsequent CT scans (performed in late 2018) showed \"nodular foci in the retroperitoneum\", a \"heterogeneous lesion inferior in the right lobe of the liver\" and \"small, tiny peripheral densities in the lungs most noticeable on the right\", consistent with more widespread and metastatic disease. She underwent a CT-guided biopsy of a retroperitoneal mass at  on 2018, and the pathology results were consistent with recurrent, renal cell carcinoma. She was subsequently referred to our clinic for further evaluation and management.    INTERIM HISTORY:  Ms. " "Cullen returns to clinic today for follow up accompanied by her daughter. She has a history of longstanding back pain (which, for years, has had a tendency to worsen when she stands up to wash dishes and forces her to sit down periodically to rest) and fatigue, both of which may have slowly been getting a little worse recently. Her back pain remains controlled on her current schedule of prn Percocet (when she is taking it; she has been out of her supply for ~two weeks now). She has had some recent UTI symptoms, for which she went to the ED a couple of days ago and was prescribed an antibiotic (which she plans to start today). Since we last saw her in clinic, she has been evaluated by  urology, and surgery to resect the localized, recurrent retroperitoneal mass is currently planned \"later this summer\".    Past Medical History:   Diagnosis Date   • Arthritis    • Cancer (CMS/HCC)     left Kidney   • Chronic kidney disease, stage III (moderate) (CMS/HCC)    • Diabetes mellitus (CMS/HCC)    • High cholesterol    • Hypertension    • Low back pain    • Spinal stenosis        Past Surgical History:   Procedure Laterality Date   • BLADDER SURGERY     • HYSTERECTOMY     • KIDNEY SURGERY Left     Nephrectomy for cancerous mass   • [place] OPEN BHAVANA FIXATN HUMERAL SHAFT FX Left 4/20/2017    Procedure: LEFT HUMERUS INTRAMEDULLARY NAIL/BHAVANA INSERTION;  Surgeon: Jose Willard MD;  Location: [facility];  Service: Orthopedics       Social History     Socioeconomic History   • Marital status: Legally      Spouse name: Not on file   • Number of children: Not on file   • Years of education: Not on file   • Highest education level: Not on file   Tobacco Use   • Smoking status: Never Smoker   • Smokeless tobacco: Never Used   Substance and Sexual Activity   • Alcohol use: Yes     Comment: social   • Drug use: Defer   • Sexual activity: Defer       Family History   Problem Relation Age of Onset   • Parkinsonism Mother    • " Heart disease Father    • Diabetes Father    • Cancer Brother        Allergies   Allergen Reactions   • Sulfa Antibiotics        Current Outpatient Medications   Medication Sig Dispense Refill   • amLODIPine (NORVASC) 5 MG tablet Take 5 mg by mouth Daily.     • aspirin 325 MG tablet Take 325 mg by mouth daily.     • carbidopa-levodopa (SINEMET)  MG per tablet Take 1 tablet by mouth 3 (Three) Times a Day.     • gabapentin (NEURONTIN) 600 MG tablet Take 600 mg by mouth 3 (three) times a day.     • metFORMIN XR (GLUCOPHAGE-XR) 500 MG 24 hr tablet Take 500 mg by mouth daily with breakfast.     • oxyCODONE-acetaminophen (PERCOCET) 7.5-325 MG per tablet Take 1 tablet by mouth Every 6 (Six) Hours As Needed for Moderate Pain . 120 tablet 0     No current facility-administered medications for this visit.      REVIEW OF SYSTEMS  CONSTITUTIONAL:  No fever, chills or night sweats. Chronic fatigue, stable.  EYES:  No blurry vision, diplopia or other vision changes.  ENT:  No hearing loss, nosebleeds or sore throat.  CARDIOVASCULAR:  No palpitations, arrhythmia, syncopal episodes or edema.  PULMONARY:  No hemoptysis, wheezing, chronic cough or shortness of breath.  GASTROINTESTINAL:  No nausea or vomiting.  No constipation or diarrhea. No abdominal pain.  GENITOURINARY:  No hematuria or kidney stones. Recent burning with urination, for which she is starting an antibiotic today, as per the HPI above.  MUSCULOSKELETAL:  Chronic back pain, radiating slightly into her right flank, currently still well controlled (when on Percocet), as per the HPI above.  INTEGUMENTARY: No rashes or pruritus.  ENDOCRINE:  No excessive thirst or hot flashes.  HEMATOLOGIC:  No history of free bleeding, spontaneous bleeding or clotting.  IMMUNOLOGIC:  No allergies or frequent infections.  NEUROLOGIC: No numbness, tingling, seizures or weakness.  PSYCHIATRIC:  Some increased anxiety over her recent diagnosis with recurrent, metastatic cancer,  recently better. Some recent depressive symptoms (since her back pain has been worse these past two weeks once she ran out of Percocet).    PHYSICAL EXAMINATION  BP (!) 84/63   Pulse 105   Temp 97.9 °F (36.6 °C) (Oral)   Resp 18   SpO2 90%     GENERAL:  A well-developed, well-nourished, elderly,  female in no acute distress.  HEENT:  Pupils equally round and reactive to light. Extraocular muscles intact.  CARDIOVASCULAR:  Regular rate and rhythm.  No murmurs, gallops or rubs.  LUNGS:  Clear to auscultation bilaterally.  ABDOMEN:  Soft, nontender, nondistended with positive bowel sounds.  EXTREMITIES:  No clubbing, cyanosis or edema bilaterally.  SKIN:  No rashes or petechiae.  NEURO:  Cranial nerves grossly intact.  No focal deficits.  PSYCH:  Alert and oriented x3.    LABORATORY    Lab Results   Component Value Date    WBC 4.77 06/14/2019    HGB 12.7 06/14/2019    HCT 38.7 06/14/2019    MCV 95.8 06/14/2019     06/14/2019    NEUTROABS 2.87 06/14/2019       Lab Results   Component Value Date     03/05/2019    K 4.0 03/05/2019     03/05/2019    CO2 20.3 (L) 03/05/2019    BUN 21 03/05/2019    CREATININE 1.31 (H) 03/05/2019    GLUCOSE 124 (H) 03/05/2019    CALCIUM 9.8 03/05/2019    AST 17 03/05/2019    ALT 5 (L) 03/05/2019    ALKPHOS 95 03/05/2019    BILITOT 0.5 03/05/2019    PROTEINTOT 8.2 (H) 03/05/2019    ALBUMIN 4.70 03/05/2019     CBC (06/14/2019): WBCs: 4.7; HgB: 12.7; Hct: 38.7; platelets: 252; MCV: 95.8  CBC (03/05/2019): WBCs: 5.9; HgB: 13.6; Hct: 41.4; platelets: 264; MCV: 99.0  CBC (11/20/2018): WBCs: 5.3; HgB: 13.0; Hct: 40.4; platelets: 243; MCV: 96.9  CBC (08/23/2018): WBCs: 5.7; HgB: 13.8; Hct: 43.4; platelets: 248    IMAGING  CT chest, abdomen and pelvis with contrast (06/30/2018, at ):  Impression:  1) Small, tiny peripheral densities in the lungs most noticeable on the right.  2) Heterogeneous lesion inferior in the right lobe of the liver.  3) Heterogeneous  enhancing nodular foci in the retroperitoneum.  4) Vascular calcifications with moderate coronary artery disease.  5) Moderate degenerative changes in the spine with moderate spinal canal stenosis.    CT chest without contrast (08/16/2018):  Impression: Negative CT scan of the thorax. Nothing is seen to suggest metastatic disease in the chest.    CT abdomen and pelvis without contrast (08/16/2018):  Impression: Patient status post right nephrectomy. There is a low density mass in the retroperitoneum that would be consistent with metastatic disease to the retroperitoneal lymph nodes possibly involving the inferior vena cava. It measures approximately 3.8 cm in diameter.    NM bone scan, whole body (09/27/2018):  Impression: There are no findings to suggest metastatic disease or to explain the patient's symptoms.    CT chest without contrast (11/20/2018):  Impression: Stable CT scan of the thorax. Nothing seen to suggest metastatic disease. There is fairly heavy coronary artery calcification. There were no pulmonary parenchymal lung nodules or pleural effusions. No enlarged lymph nodes were demonstrated.    CT abdomen and pelvis without contrast (11/20/2018):  Impression:  Patient status post right nephrectomy. There continues to be some abnormal soft tissue density in the retroperitoneum in the pericaval area concerning for retroperitoneal lymphadenopathy. This is completely unchanged, however, from 08/2018 exam.    CT abdomen and pelvis without contrast (03/05/2019):  Impression: Stable appearance of the abdomen including soft tissue density mass in the retroperitoneal region. There continues to be some abnormal nearly 7 cm soft tissue density in the retroperitoneum in the pericaval area that remains stable.    PATHOLOGY  Right retroperitoneal mass (07/12/2018):  Positive for malignancy; consistent with recurrent renal cell carcinoma. PAX8 is strongly positive in tumor cells, and Inhibin is negative.    Right  kidney, radical nephrectomy (11/05/2008, per pathology report; outside slides):  Renal cell carcinoma, clear cell type. Unremarkable adrenal gland.    IMPRESSION AND PLAN  Ms. Berman is a 69 y.o.,  female with:  1. Renal cell carcinoma: Initially diagnosed in 2008 and status post a left-sided nephrectomy, with no signs/symptoms of recurrent disease for the next ten years. Unfortunately, as of June 2018, this was/is no longer the case. CT scans performed at  on 06/30/2018 identified multiple foci in the retroperitoneum (along with possible lung lesions; although lung imaging since then has been less concerning), consistent with (and CT-guided FNA confirmed) recurrent, and now metastatic, disease. I have had several long discussions with the patient and her daughters since the time of her initial presentation in our clinic (on 07/25/2018) regarding this diagnosis and, in general terms, its prognosis. In short, they remain aware that this disease is likely no longer curable; but that, particularly given her good performance status, it is treatable. While there are have always been multiple, systemic options available to us (including PO TKIs, such as pazopanib, and IV immunotherapy, such as qmonthly nivolumab), it has continued to be reasonable to defer starting any specific, systemic treatment to date. Renal cell carcinoma is typically slow growing, and this appears to especially be the case here. She did not relapse for ten years, the CT scans performed in late June 2018 at  showed a low, tumor burden (and this continues to be the case on the most recent repeat imaging, with extremely stable disease seen on the CT scans performed on both 03/05/2019 and 11/20/2018 and the NM bone scan performed on 09/27/2018, all summarized above; the latter showed no evidence of skeletal mets); and she was (and remains) minimally (other than issue #2) symptomatic. In general, the longer an elderly patient with  "multiple comobidities can avoid the potential side effects of palliative treatment, the better. Given her very stable, oligometastatic disease, it seemed worthwhile in sending her back to urology for reevaluation (to consider resecting the retroperitoneal, soft tissue mass?). This was done in Spring 2019, and following an evaluation at  (which included an MRI a few weeks ago to make sure the mass did not appear to involve the great vessels), their ( urology's) current plan is to perform surgery \"later this summer\". She will follow up with them, as previously planned, to have this procedure. We will see her back in our clinic in one month with a CBC and CMP.  2. Back pain: A chronic issue which the retroperitoneal foci from issue #1 is likely exacerbating. Our clinic has agreed to provide her with her pain medicine. Prn Norco had been minimally effective, but Percocet 7.5/325 mg q6hr prn has been beneficial. A refill of the latter was provided today. Continue to monitor.  3. UTI: Patient has a Rx for antibiotics that she plans to  today and start.  4. Depression: The patient and her daughter were instructed to notify us by next week if restarting her Percocet (to get issue #2 back under control) does not improve her mood, and we will consider starting an SSRI.  The patient and her daughter were in agreement with these plans.    [facility] Quality Measures:  a) The patient does not currently use tobacco products (she quit smoking a long time ago).  b) The patient's BMI is above normal parameters. Plan includes a referral to primary care.  c) The current outpatient and discharge medications have been reconciled for the patient.    It is a pleasure to participate in Ms. Berman's care. Please do not hesitate to call with any questions or concerns that you may have.    A total of 30 minutes were spent coordinating this patient’s care in clinic today; more than 50% of this time was face-to-face with the patient and " her daughter, reviewing her interim medical history and counseling on the current treatment and followup plan. All questions were answered to their satisfaction.    FOLLOW UP  Refill of Percocet 7.5 mg PO q6hr prn disp #60 provided today. Begin the antibiotic today that was previously provided by the ED. With [facility], as previously planned, for resection of the locally recurrent, retroperitoneal mass. Return to our clinic in 1 month with a CBC and CMP.        This document was electronically signed by GABY Graham MD June 14, 2019 11:35 AM      CC: ANGELA Clark MD

## 2019-07-16 ENCOUNTER — TELEPHONE (OUTPATIENT)
Dept: ONCOLOGY | Facility: HOSPITAL | Age: 69
End: 2019-07-16

## 2019-07-16 RX ORDER — OXYCODONE AND ACETAMINOPHEN 7.5; 325 MG/1; MG/1
1 TABLET ORAL EVERY 6 HOURS PRN
Qty: 120 TABLET | Refills: 0 | Status: SHIPPED | OUTPATIENT
Start: 2019-07-16 | End: 2019-08-12 | Stop reason: SDUPTHER

## 2019-08-12 ENCOUNTER — TELEPHONE (OUTPATIENT)
Dept: ONCOLOGY | Facility: HOSPITAL | Age: 69
End: 2019-08-12

## 2019-08-12 RX ORDER — OXYCODONE AND ACETAMINOPHEN 7.5; 325 MG/1; MG/1
1 TABLET ORAL EVERY 6 HOURS PRN
Qty: 120 TABLET | Refills: 0 | Status: SHIPPED | OUTPATIENT
Start: 2019-08-12 | End: 2019-10-22 | Stop reason: SDUPTHER

## 2019-08-12 NOTE — TELEPHONE ENCOUNTER
I have gotten the script ready for . I have called and made the patient aware. She verbalized understanding.

## 2019-08-12 NOTE — TELEPHONE ENCOUNTER
----- Message from Trisha Montes sent at 8/12/2019  8:35 AM EDT -----  Regarding: REFILL  Contact: [phone]  Nancy called for refill on her pain medication. Please call when ready. Thank you

## 2019-10-22 ENCOUNTER — OFFICE VISIT (OUTPATIENT)
Dept: ONCOLOGY | Facility: CLINIC | Age: 69
End: 2019-10-22

## 2019-10-22 ENCOUNTER — LAB (OUTPATIENT)
Dept: ONCOLOGY | Facility: CLINIC | Age: 69
End: 2019-10-22

## 2019-10-22 VITALS
TEMPERATURE: 97.6 F | SYSTOLIC BLOOD PRESSURE: 150 MMHG | RESPIRATION RATE: 18 BRPM | HEART RATE: 73 BPM | OXYGEN SATURATION: 98 % | DIASTOLIC BLOOD PRESSURE: 83 MMHG | BODY MASS INDEX: 26.59 KG/M2 | WEIGHT: 159.8 LBS

## 2019-10-22 DIAGNOSIS — C64.9 CARCINOMA OF KIDNEY, UNSPECIFIED LATERALITY (HCC): Primary | ICD-10-CM

## 2019-10-22 DIAGNOSIS — C64.9 CARCINOMA OF KIDNEY, UNSPECIFIED LATERALITY (HCC): ICD-10-CM

## 2019-10-22 LAB
ALBUMIN SERPL-MCNC: 4.14 G/DL (ref 3.5–5.2)
ALBUMIN/GLOB SERPL: 1 G/DL
ALP SERPL-CCNC: 125 U/L (ref 39–117)
ALT SERPL W P-5'-P-CCNC: <5 U/L (ref 1–33)
ANION GAP SERPL CALCULATED.3IONS-SCNC: 14.4 MMOL/L (ref 5–15)
AST SERPL-CCNC: 17 U/L (ref 1–32)
BASOPHILS # BLD AUTO: 0.06 10*3/MM3 (ref 0–0.2)
BASOPHILS NFR BLD AUTO: 1 % (ref 0–1.5)
BILIRUB SERPL-MCNC: 0.3 MG/DL (ref 0.2–1.2)
BUN BLD-MCNC: 19 MG/DL (ref 8–23)
BUN/CREAT SERPL: 18.3 (ref 7–25)
CALCIUM SPEC-SCNC: 9.4 MG/DL (ref 8.6–10.5)
CHLORIDE SERPL-SCNC: 100 MMOL/L (ref 98–107)
CO2 SERPL-SCNC: 22.6 MMOL/L (ref 22–29)
CREAT BLD-MCNC: 1.04 MG/DL (ref 0.57–1)
DEPRECATED RDW RBC AUTO: 43.3 FL (ref 37–54)
EOSINOPHIL # BLD AUTO: 0.06 10*3/MM3 (ref 0–0.4)
EOSINOPHIL NFR BLD AUTO: 1 % (ref 0.3–6.2)
ERYTHROCYTE [DISTWIDTH] IN BLOOD BY AUTOMATED COUNT: 12.6 % (ref 12.3–15.4)
GFR SERPL CREATININE-BSD FRML MDRD: 64 ML/MIN/1.73
GLOBULIN UR ELPH-MCNC: 4.1 GM/DL
GLUCOSE BLD-MCNC: 153 MG/DL (ref 65–99)
HCT VFR BLD AUTO: 39.1 % (ref 34–46.6)
HGB BLD-MCNC: 12.8 G/DL (ref 12–15.9)
IMM GRANULOCYTES # BLD AUTO: 0.01 10*3/MM3 (ref 0–0.05)
IMM GRANULOCYTES NFR BLD AUTO: 0.2 % (ref 0–0.5)
LYMPHOCYTES # BLD AUTO: 2.74 10*3/MM3 (ref 0.7–3.1)
LYMPHOCYTES NFR BLD AUTO: 44.8 % (ref 19.6–45.3)
MCH RBC QN AUTO: 30.4 PG (ref 26.6–33)
MCHC RBC AUTO-ENTMCNC: 32.7 G/DL (ref 31.5–35.7)
MCV RBC AUTO: 92.9 FL (ref 79–97)
MONOCYTES # BLD AUTO: 0.45 10*3/MM3 (ref 0.1–0.9)
MONOCYTES NFR BLD AUTO: 7.4 % (ref 5–12)
NEUTROPHILS # BLD AUTO: 2.79 10*3/MM3 (ref 1.7–7)
NEUTROPHILS NFR BLD AUTO: 45.6 % (ref 42.7–76)
NRBC BLD AUTO-RTO: 0 /100 WBC (ref 0–0.2)
PLATELET # BLD AUTO: 318 10*3/MM3 (ref 140–450)
PMV BLD AUTO: 10.5 FL (ref 6–12)
POTASSIUM BLD-SCNC: 4.1 MMOL/L (ref 3.5–5.2)
PROT SERPL-MCNC: 8.2 G/DL (ref 6–8.5)
RBC # BLD AUTO: 4.21 10*6/MM3 (ref 3.77–5.28)
SODIUM BLD-SCNC: 137 MMOL/L (ref 136–145)
WBC NRBC COR # BLD: 6.11 10*3/MM3 (ref 3.4–10.8)

## 2019-10-22 PROCEDURE — 99214 OFFICE O/P EST MOD 30 MIN: CPT | Performed by: INTERNAL MEDICINE

## 2019-10-22 PROCEDURE — 80053 COMPREHEN METABOLIC PANEL: CPT | Performed by: INTERNAL MEDICINE

## 2019-10-22 PROCEDURE — 85025 COMPLETE CBC W/AUTO DIFF WBC: CPT | Performed by: INTERNAL MEDICINE

## 2019-10-22 RX ORDER — OXYCODONE AND ACETAMINOPHEN 7.5; 325 MG/1; MG/1
1 TABLET ORAL EVERY 6 HOURS PRN
Qty: 120 TABLET | Refills: 0 | Status: SHIPPED | OUTPATIENT
Start: 2019-10-22 | End: 2019-11-19 | Stop reason: SDUPTHER

## 2019-10-22 NOTE — PROGRESS NOTES
"  Name:  Nancy Berman  :  1950  Date:  10/22/2019     REFERRING PROVIDER  ANGELA Clark    PRIMARY CARE PROVIDER  Julee Arechiga APRN    REASON FOR FOLLOWUP  1. Carcinoma of kidney, unspecified laterality (CMS/HCC)      CHIEF COMPLAINT  Chronic low back pain, still manageable with prn Percocet.    Dear Ms. Michelsnut,    HISTORY OF PRESENT ILLNESS:   I saw Ms. Berman in follow up today in our medical oncology clinic. As you are aware, she is a pleasant, 69 y.o.,  female with a history of hypertension, diabetes and chronic kidney disease who was initially diagnosed with, and underwent an uncomplicated resection of, a left-sided kidney cancer in . Adjuvant therapy was not indicated. Ultimately, she had been doing very well from the standpoint of this disease ever since then (for the past ten years). However, in early summer 2018, as part of a routine follow up evaluation for her chronic kidney disease (of her remaining, right kidney), you ordered a renal ultrasound. Unexpectedly, this study identified a retroperitoneal mass; and subsequent CT scans (performed in late 2018) showed \"nodular foci in the retroperitoneum\", a \"heterogeneous lesion inferior in the right lobe of the liver\" and \"small, tiny peripheral densities in the lungs most noticeable on the right\", consistent with more widespread and metastatic disease. She underwent a CT-guided biopsy of a retroperitoneal mass at  on 2018, and the pathology results were consistent with recurrent, renal cell carcinoma. She was subsequently referred to our clinic for further evaluation and management.    INTERIM HISTORY:  Ms. Berman returns to clinic today for follow up accompanied by her daughter. She has a history of longstanding back pain (which, for years, has had a tendency to worsen when she stands up to wash dishes and forces her to sit down periodically to rest) and fatigue. Her back pain remains " "controlled on her current schedule of prn Percocet (when she is taking it; she occasionally runs out of her supply and \"forgets\" to get it refilled in a timely fashion). Since we last saw her in clinic, she underwent resection of her recurrent, retroperitoneal mass by  urology (on 07/22/2019), as planned. This procedure went very well, and she has recovered nicely. She has no new or other specific complaints in clinic today.    Past Medical History:   Diagnosis Date   • Arthritis    • Cancer (CMS/HCC)     left Kidney   • Chronic kidney disease, stage III (moderate) (CMS/HCC)    • Diabetes mellitus (CMS/HCC)    • High cholesterol    • Hypertension    • Low back pain    • Spinal stenosis        Past Surgical History:   Procedure Laterality Date   • BLADDER SURGERY     • HYSTERECTOMY     • KIDNEY SURGERY Left     Nephrectomy for cancerous mass   • [place] OPEN BHAVANA FIXATN HUMERAL SHAFT FX Left 4/20/2017    Procedure: LEFT HUMERUS INTRAMEDULLARY NAIL/BHAVANA INSERTION;  Surgeon: Jose Willard MD;  Location: [facility];  Service: Orthopedics       Social History     Socioeconomic History   • Marital status: Legally      Spouse name: Not on file   • Number of children: Not on file   • Years of education: Not on file   • Highest education level: Not on file   Tobacco Use   • Smoking status: Never Smoker   • Smokeless tobacco: Never Used   Substance and Sexual Activity   • Alcohol use: Yes     Comment: social   • Drug use: Defer   • Sexual activity: Defer       Family History   Problem Relation Age of Onset   • Parkinsonism Mother    • Heart disease Father    • Diabetes Father    • Cancer Brother        Allergies   Allergen Reactions   • Sulfa Antibiotics        Current Outpatient Medications   Medication Sig Dispense Refill   • amLODIPine (NORVASC) 5 MG tablet Take 5 mg by mouth Daily.     • aspirin 325 MG tablet Take 325 mg by mouth daily.     • carbidopa-levodopa (SINEMET)  MG per tablet Take 1 tablet by " mouth 3 (Three) Times a Day.     • gabapentin (NEURONTIN) 600 MG tablet Take 600 mg by mouth 3 (three) times a day.     • metFORMIN XR (GLUCOPHAGE-XR) 500 MG 24 hr tablet Take 500 mg by mouth daily with breakfast.     • oxyCODONE-acetaminophen (PERCOCET) 7.5-325 MG per tablet Take 1 tablet by mouth Every 6 (Six) Hours As Needed for Moderate Pain . 120 tablet 0     No current facility-administered medications for this visit.      REVIEW OF SYSTEMS  CONSTITUTIONAL:  No fever, chills or night sweats. Chronic fatigue, stable.  EYES:  No blurry vision, diplopia or other vision changes.  ENT:  No hearing loss, nosebleeds or sore throat.  CARDIOVASCULAR:  No palpitations, arrhythmia, syncopal episodes or edema.  PULMONARY:  No hemoptysis, wheezing, chronic cough or shortness of breath.  GASTROINTESTINAL:  No nausea or vomiting.  No constipation or diarrhea. No abdominal pain.  GENITOURINARY:  No hematuria or kidney stones. Recent burning with urination, for which she is starting an antibiotic today, as per the HPI above.  MUSCULOSKELETAL:  Chronic back pain, radiating slightly into her right flank, currently still well controlled (when on Percocet), as per the HPI above.  INTEGUMENTARY: No rashes or pruritus.  ENDOCRINE:  No excessive thirst or hot flashes.  HEMATOLOGIC:  No history of free bleeding, spontaneous bleeding or clotting.  IMMUNOLOGIC:  No allergies or frequent infections.  NEUROLOGIC: No numbness, tingling, seizures or weakness.  PSYCHIATRIC:  Some increased anxiety over her recent diagnosis with recurrent, metastatic cancer, recently better.    PHYSICAL EXAMINATION  /83   Pulse 73   Temp 97.6 °F (36.4 °C) (Oral)   Resp 18   Wt 72.5 kg (159 lb 12.8 oz)   SpO2 98%   BMI 26.59 kg/m²     GENERAL:  A well-developed, well-nourished, elderly,  female in no acute distress.  HEENT:  Pupils equally round and reactive to light. Extraocular muscles intact.  CARDIOVASCULAR:  Regular rate and  rhythm.  No murmurs, gallops or rubs.  LUNGS:  Clear to auscultation bilaterally.  ABDOMEN:  Soft, nontender, nondistended with positive bowel sounds. Well-healed abdominopelvic surgical scars.  EXTREMITIES:  No clubbing, cyanosis or edema bilaterally.  SKIN:  No rashes or petechiae.  NEURO:  Cranial nerves grossly intact.  No focal deficits.  PSYCH:  Alert and oriented x3.    LABORATORY    Lab Results   Component Value Date    WBC 6.11 10/22/2019    HGB 12.8 10/22/2019    HCT 39.1 10/22/2019    MCV 92.9 10/22/2019     10/22/2019    NEUTROABS 2.79 10/22/2019       Lab Results   Component Value Date     06/14/2019    K 3.8 06/14/2019     06/14/2019    CO2 21.6 (L) 06/14/2019    BUN 15 06/14/2019    CREATININE 1.20 (H) 06/14/2019    GLUCOSE 186 (H) 06/14/2019    CALCIUM 9.5 06/14/2019    AST 22 06/14/2019    ALT 17 06/14/2019    ALKPHOS 89 06/14/2019    BILITOT 0.4 06/14/2019    PROTEINTOT 7.9 06/14/2019    ALBUMIN 4.25 06/14/2019     CBC (06/14/2019): WBCs: 4.7; HgB: 12.7; Hct: 38.7; platelets: 252; MCV: 95.8  CBC (03/05/2019): WBCs: 5.9; HgB: 13.6; Hct: 41.4; platelets: 264; MCV: 99.0  CBC (11/20/2018): WBCs: 5.3; HgB: 13.0; Hct: 40.4; platelets: 243; MCV: 96.9  CBC (08/23/2018): WBCs: 5.7; HgB: 13.8; Hct: 43.4; platelets: 248    IMAGING  CT chest, abdomen and pelvis with contrast (06/30/2018, at ):  Impression:  1) Small, tiny peripheral densities in the lungs most noticeable on the right.  2) Heterogeneous lesion inferior in the right lobe of the liver.  3) Heterogeneous enhancing nodular foci in the retroperitoneum.  4) Vascular calcifications with moderate coronary artery disease.  5) Moderate degenerative changes in the spine with moderate spinal canal stenosis.    CT chest without contrast (08/16/2018):  Impression: Negative CT scan of the thorax. Nothing is seen to suggest metastatic disease in the chest.    CT abdomen and pelvis without contrast (08/16/2018):  Impression: Patient status  post right nephrectomy. There is a low density mass in the retroperitoneum that would be consistent with metastatic disease to the retroperitoneal lymph nodes possibly involving the inferior vena cava. It measures approximately 3.8 cm in diameter.    NM bone scan, whole body (09/27/2018):  Impression: There are no findings to suggest metastatic disease or to explain the patient's symptoms.    CT chest without contrast (11/20/2018):  Impression: Stable CT scan of the thorax. Nothing seen to suggest metastatic disease. There is fairly heavy coronary artery calcification. There were no pulmonary parenchymal lung nodules or pleural effusions. No enlarged lymph nodes were demonstrated.    CT abdomen and pelvis without contrast (11/20/2018):  Impression:  Patient status post right nephrectomy. There continues to be some abnormal soft tissue density in the retroperitoneum in the pericaval area concerning for retroperitoneal lymphadenopathy. This is completely unchanged, however, from 08/2018 exam.    CT abdomen and pelvis without contrast (03/05/2019):  Impression: Stable appearance of the abdomen including soft tissue density mass in the retroperitoneal region. There continues to be some abnormal nearly 7 cm soft tissue density in the retroperitoneum in the pericaval area that remains stable.    PATHOLOGY  Right kidney, radical nephrectomy (11/05/2008, per pathology report; outside slides):  Renal cell carcinoma, clear cell type. Unremarkable adrenal gland.    Right retroperitoneal mass (07/12/2018):  Positive for malignancy; consistent with recurrent renal cell carcinoma. PAX8 is strongly positive in tumor cells, and Inhibin is negative.    Retroperitoneal mass, resection (07/22/2019):  Metastatic renal cell carcinoma.    IMPRESSION AND PLAN  Ms. Berman is a 69 y.o.,  female with:  1. Renal cell carcinoma: Initially diagnosed in 2008 and status post a left-sided nephrectomy, with no signs/symptoms of  recurrent disease for the next ten years. Unfortunately, as of June 2018, this was/is no longer the case. CT scans performed at  on 06/30/2018 identified multiple foci in the retroperitoneum (along with possible lung lesions; although lung imaging since then has been less concerning), consistent with (and CT-guided FNA confirmed) recurrent, and now metastatic, disease. I have had several long discussions with the patient and her daughters since the time of her initial presentation in our clinic (on 07/25/2018) regarding this diagnosis and, in general terms, its prognosis. In short, they remain aware that this disease is likely no longer curable; but that, particularly given her good performance status, it is treatable. While there are have always been multiple, systemic options available to us (including PO TKIs, such as pazopanib, and IV immunotherapy, such as qmonthly nivolumab), it has continued to be reasonable to defer starting any specific, systemic treatment to date. Renal cell carcinoma is typically slow growing, and this appears to especially be the case here. She did not relapse for ten years, the CT scans performed in late June 2018 at  showed a low, tumor burden (and this continued to be the case on the most recent repeat imaging, with extremely stable disease seen on the CT scans performed on both 03/05/2019 and 11/20/2018 and the NM bone scan performed on 09/27/2018, all summarized above; the latter showed no evidence of skeletal mets); and she was (and remains) minimally (other than issue #2) symptomatic. In general, the longer an elderly patient with multiple comobidities can avoid the potential side effects of palliative treatment, the better. Given her very stable, oligometastatic disease, it seemed worthwhile in sending her back to urology for reevaluation (to consider resecting the retroperitoneal, soft tissue mass?). This was done in Spring 2019, and following an evaluation at  (which  included an MRI to make sure the mass did not appear to involve the great vessels),  urology ultimately took her to the OR on 07/22/2019. Resection of the mass reportedly went very well. Today, ~three months later, she has recovered well and has no new complaints. She will follow up with  urology on 10/30/2019 with repeat imaging, as previously planned. We will see her back in our clinic in one month with a CBC and CMP.  2. Back pain: A chronic issue which the retroperitoneal foci from issue #1 was likely exacerbating. Our clinic has agreed to provide her with her pain medicine. Prn Norco had been minimally effective, but Percocet 7.5/325 mg q6hr prn has been beneficial. A refill of the latter was provided today. Continue to monitor.  The patient and her daughter were in agreement with these plans.    It is a pleasure to participate in Ms. Berman's care. Please do not hesitate to call with any questions or concerns that you may have.    A total of 30 minutes were spent coordinating this patient’s care in clinic today; more than 50% of this time was face-to-face with the patient and her daughter, reviewing her interim medical history and counseling on the current evaluation, treatment and followup plan. All questions were answered to their satisfaction.    FOLLOW UP  Refill of Percocet 7.5 mg PO q6hr prn disp #120 provided today. With [facility] on 10/30/2019 with repeat imaging, as previously planned. Return to our clinic in 1 month with a CBC and CMP.          This document was electronically signed by GABY Graham MD October 22, 2019 2:29 PM      CC: ANGELA Clark MD

## 2019-11-19 ENCOUNTER — OFFICE VISIT (OUTPATIENT)
Dept: ONCOLOGY | Facility: CLINIC | Age: 69
End: 2019-11-19

## 2019-11-19 ENCOUNTER — LAB (OUTPATIENT)
Dept: ONCOLOGY | Facility: CLINIC | Age: 69
End: 2019-11-19

## 2019-11-19 VITALS
HEART RATE: 75 BPM | BODY MASS INDEX: 26.63 KG/M2 | RESPIRATION RATE: 18 BRPM | SYSTOLIC BLOOD PRESSURE: 152 MMHG | DIASTOLIC BLOOD PRESSURE: 95 MMHG | OXYGEN SATURATION: 98 % | TEMPERATURE: 98.3 F | WEIGHT: 160 LBS

## 2019-11-19 DIAGNOSIS — C64.9 CARCINOMA OF KIDNEY, UNSPECIFIED LATERALITY (HCC): Primary | ICD-10-CM

## 2019-11-19 DIAGNOSIS — C64.9 CARCINOMA OF KIDNEY, UNSPECIFIED LATERALITY (HCC): ICD-10-CM

## 2019-11-19 PROCEDURE — 99214 OFFICE O/P EST MOD 30 MIN: CPT | Performed by: INTERNAL MEDICINE

## 2019-11-19 RX ORDER — OXYCODONE AND ACETAMINOPHEN 7.5; 325 MG/1; MG/1
1 TABLET ORAL EVERY 6 HOURS PRN
Qty: 120 TABLET | Refills: 0 | Status: SHIPPED | OUTPATIENT
Start: 2019-11-19 | End: 2020-01-24 | Stop reason: SDUPTHER

## 2019-11-19 NOTE — PROGRESS NOTES
"  Name:  Nancy Berman  :  1950  Date:  2019     REFERRING PROVIDER  ANGELA Clark    PRIMARY CARE PROVIDER  Julee Arechiga APRN    REASON FOR FOLLOWUP  1. Carcinoma of kidney, unspecified laterality (CMS/HCC)      CHIEF COMPLAINT  Chronic low back pain, still manageable with prn Percocet.    Dear Ms. Michelsnut,    HISTORY OF PRESENT ILLNESS:   I saw Ms. Berman in follow up today in our medical oncology clinic. As you are aware, she is a pleasant, 69 y.o.,  female with a history of hypertension, diabetes and chronic kidney disease who was initially diagnosed with, and underwent an uncomplicated resection of, a left-sided kidney cancer in . Adjuvant therapy was not indicated. Ultimately, she had been doing very well from the standpoint of this disease ever since then (for the past ten years). However, in early summer 2018, as part of a routine follow up evaluation for her chronic kidney disease (of her remaining, right kidney), you ordered a renal ultrasound. Unexpectedly, this study identified a retroperitoneal mass; and subsequent CT scans (performed in late 2018) showed \"nodular foci in the retroperitoneum\", a \"heterogeneous lesion inferior in the right lobe of the liver\" and \"small, tiny peripheral densities in the lungs most noticeable on the right\", consistent with more widespread and metastatic disease. She underwent a CT-guided biopsy of a retroperitoneal mass at  on 2018, and the pathology results were consistent with recurrent, renal cell carcinoma. She was subsequently referred to our clinic for further evaluation and management.    INTERIM HISTORY:  Ms. Berman returns to clinic today for follow up accompanied by her daughter. She has a history of longstanding back pain (which, for years, has had a tendency to worsen when she stands up to wash dishes and forces her to sit down periodically to rest) and fatigue. Her back pain remains " "controlled on her current schedule of prn Percocet (when she is taking it; she occasionally runs out of her supply and \"forgets\" to get it refilled in a timely fashion). She underwent resection of her recurrent, retroperitoneal mass by  urology on 07/22/2019. This procedure went very well, and she has recovered nicely. Other than her ongoing, chronic back pain, she again has no specific complaints and continues to feel overall well.    Past Medical History:   Diagnosis Date   • Arthritis    • Cancer (CMS/HCC)     left Kidney   • Chronic kidney disease, stage III (moderate) (CMS/HCC)    • Diabetes mellitus (CMS/HCC)    • High cholesterol    • Hypertension    • Low back pain    • Spinal stenosis        Past Surgical History:   Procedure Laterality Date   • BLADDER SURGERY     • HYSTERECTOMY     • KIDNEY SURGERY Left     Nephrectomy for cancerous mass   • [place] OPEN BHAVANA FIXATN HUMERAL SHAFT FX Left 4/20/2017    Procedure: LEFT HUMERUS INTRAMEDULLARY NAIL/BHAVANA INSERTION;  Surgeon: Jose Willard MD;  Location: [facility];  Service: Orthopedics       Social History     Socioeconomic History   • Marital status: Legally      Spouse name: Not on file   • Number of children: Not on file   • Years of education: Not on file   • Highest education level: Not on file   Tobacco Use   • Smoking status: Never Smoker   • Smokeless tobacco: Never Used   Substance and Sexual Activity   • Alcohol use: Yes     Comment: social   • Drug use: Defer   • Sexual activity: Defer       Family History   Problem Relation Age of Onset   • Parkinsonism Mother    • Heart disease Father    • Diabetes Father    • Cancer Brother        Allergies   Allergen Reactions   • Sulfa Antibiotics        Current Outpatient Medications   Medication Sig Dispense Refill   • amLODIPine (NORVASC) 5 MG tablet Take 5 mg by mouth Daily.     • aspirin 325 MG tablet Take 325 mg by mouth daily.     • carbidopa-levodopa (SINEMET)  MG per tablet Take 1 " tablet by mouth 3 (Three) Times a Day.     • gabapentin (NEURONTIN) 600 MG tablet Take 600 mg by mouth 3 (three) times a day.     • metFORMIN XR (GLUCOPHAGE-XR) 500 MG 24 hr tablet Take 500 mg by mouth daily with breakfast.     • oxyCODONE-acetaminophen (PERCOCET) 7.5-325 MG per tablet Take 1 tablet by mouth Every 6 (Six) Hours As Needed for Moderate Pain . 120 tablet 0     No current facility-administered medications for this visit.      REVIEW OF SYSTEMS  CONSTITUTIONAL:  No fever, chills or night sweats. Chronic fatigue, stable.  EYES:  No blurry vision, diplopia or other vision changes.  ENT:  No hearing loss, nosebleeds or sore throat.  CARDIOVASCULAR:  No palpitations, arrhythmia, syncopal episodes or edema.  PULMONARY:  No hemoptysis, wheezing, chronic cough or shortness of breath.  GASTROINTESTINAL:  No nausea or vomiting.  No constipation or diarrhea. No abdominal pain.  GENITOURINARY:  No hematuria or kidney stones. Recent burning with urination, for which she is starting an antibiotic today, as per the HPI above.  MUSCULOSKELETAL:  Chronic back pain, radiating slightly into her right flank, currently still well controlled (when on Percocet), as per the HPI above.  INTEGUMENTARY: No rashes or pruritus.  ENDOCRINE:  No excessive thirst or hot flashes.  HEMATOLOGIC:  No history of free bleeding, spontaneous bleeding or clotting.  IMMUNOLOGIC:  No allergies or frequent infections.  NEUROLOGIC: No numbness, tingling, seizures or weakness.  PSYCHIATRIC:  Mild chronic anxiety, stable.    PHYSICAL EXAMINATION  /95   Pulse 75   Temp 98.3 °F (36.8 °C) (Oral)   Resp 18   Wt 72.6 kg (160 lb)   SpO2 98%   BMI 26.63 kg/m²     GENERAL:  A well-developed, well-nourished, elderly,  female in no acute distress.  HEENT:  Pupils equally round and reactive to light. Extraocular muscles intact.  CARDIOVASCULAR:  Regular rate and rhythm.  No murmurs, gallops or rubs.  LUNGS:  Clear to auscultation  bilaterally.  ABDOMEN:  Soft, nontender, nondistended with positive bowel sounds. Well-healed abdominopelvic surgical scars.  EXTREMITIES:  No clubbing, cyanosis or edema bilaterally.  SKIN:  No rashes or petechiae.  NEURO:  Cranial nerves grossly intact.  No focal deficits.  PSYCH:  Alert and oriented x3.    LABORATORY    Lab Results   Component Value Date    WBC 6.11 10/22/2019    HGB 12.8 10/22/2019    HCT 39.1 10/22/2019    MCV 92.9 10/22/2019     10/22/2019    NEUTROABS 2.79 10/22/2019       Lab Results   Component Value Date     10/22/2019    K 4.1 10/22/2019     10/22/2019    CO2 22.6 10/22/2019    BUN 19 10/22/2019    CREATININE 1.04 (H) 10/22/2019    GLUCOSE 153 (H) 10/22/2019    CALCIUM 9.4 10/22/2019    AST 17 10/22/2019    ALT <5 10/22/2019    ALKPHOS 125 (H) 10/22/2019    BILITOT 0.3 10/22/2019    PROTEINTOT 8.2 10/22/2019    ALBUMIN 4.14 10/22/2019     CBC (10/22/2019): WBCs: 6.1; HgB: 12.8; Hct: 39.1; platelets: 318; MCV: 92.9  CBC (06/14/2019): WBCs: 4.7; HgB: 12.7; Hct: 38.7; platelets: 252; MCV: 95.8  CBC (03/05/2019): WBCs: 5.9; HgB: 13.6; Hct: 41.4; platelets: 264; MCV: 99.0  CBC (11/20/2018): WBCs: 5.3; HgB: 13.0; Hct: 40.4; platelets: 243; MCV: 96.9  CBC (08/23/2018): WBCs: 5.7; HgB: 13.8; Hct: 43.4; platelets: 248    IMAGING  CT chest, abdomen and pelvis with contrast (06/30/2018, at ):  Impression:  1) Small, tiny peripheral densities in the lungs most noticeable on the right.  2) Heterogeneous lesion inferior in the right lobe of the liver.  3) Heterogeneous enhancing nodular foci in the retroperitoneum.  4) Vascular calcifications with moderate coronary artery disease.  5) Moderate degenerative changes in the spine with moderate spinal canal stenosis.    CT chest without contrast (08/16/2018):  Impression: Negative CT scan of the thorax. Nothing is seen to suggest metastatic disease in the chest.    CT abdomen and pelvis without contrast (08/16/2018):  Impression:  Patient status post right nephrectomy. There is a low density mass in the retroperitoneum that would be consistent with metastatic disease to the retroperitoneal lymph nodes possibly involving the inferior vena cava. It measures approximately 3.8 cm in diameter.    NM bone scan, whole body (09/27/2018):  Impression: There are no findings to suggest metastatic disease or to explain the patient's symptoms.    CT chest without contrast (11/20/2018):  Impression: Stable CT scan of the thorax. Nothing seen to suggest metastatic disease. There is fairly heavy coronary artery calcification. There were no pulmonary parenchymal lung nodules or pleural effusions. No enlarged lymph nodes were demonstrated.    CT abdomen and pelvis without contrast (11/20/2018):  Impression:  Patient status post right nephrectomy. There continues to be some abnormal soft tissue density in the retroperitoneum in the pericaval area concerning for retroperitoneal lymphadenopathy. This is completely unchanged, however, from 08/2018 exam.    CT abdomen and pelvis without contrast (03/05/2019):  Impression: Stable appearance of the abdomen including soft tissue density mass in the retroperitoneal region. There continues to be some abnormal nearly 7 cm soft tissue density in the retroperitoneum in the pericaval area that remains stable.    CT chest, abdomen and pelvis with contrast (10/30/2019):  Impression: A noncalcified nodule right lower lobe on image 54 series 3 measures 7 x 6 mm and appears slightly larger, previously measuring 6 x 4 mm. No new nodules. Interval resection of retroperitoneal maxwell masses. Stable, lobulated mass in the lower right hepatic lobe.    PATHOLOGY  Right kidney, radical nephrectomy (11/05/2008, per pathology report; outside slides):  Renal cell carcinoma, clear cell type. Unremarkable adrenal gland.    Right retroperitoneal mass (07/12/2018):  Positive for malignancy; consistent with recurrent renal cell carcinoma. PAX8  is strongly positive in tumor cells, and Inhibin is negative.    Retroperitoneal mass, resection (07/22/2019):  Metastatic renal cell carcinoma.    IMPRESSION AND PLAN  Ms. Berman is a 69 y.o.,  female with:  1. Renal cell carcinoma: Initially diagnosed in 2008 and status post a left-sided nephrectomy, with no signs/symptoms of recurrent disease for the next ten years. Unfortunately, as of June 2018, this was/is no longer the case. CT scans performed at  on 06/30/2018 identified multiple foci in the retroperitoneum (along with possible lung lesions; although lung imaging since then has been less concerning), consistent with (and CT-guided FNA confirmed) recurrent, and now metastatic, disease. I have had multiple, long discussions with the patient and her daughters since the time of her initial presentation in our clinic (on 07/25/2018) regarding this diagnosis and, in general terms, its prognosis. In short, they remain aware that this disease is likely no longer curable; but that, particularly given her good performance status, it was (and remains) treatable. While there are have always been multiple, systemic options available to us (including PO TKIs, such as pazopanib, and IV immunotherapy, such as qmonthly nivolumab), it has continued to be reasonable to defer starting any specific, systemic treatment to date. Renal cell carcinoma is typically slow growing, and this appears to especially be the case here. She did not relapse for ten years, the CT scans performed in late June 2018 at  showed a low, tumor burden (and this continued to be the case from that point); and she was (and remains) minimally (other than issue #2) symptomatic. In general, the longer an elderly patient with multiple comobidities can avoid the potential side effects of palliative treatment, the better. Given her continued, very stable, oligometastatic disease, by early 2019 it seemed worthwhile to send her back to  urology for reevaluation to consider resecting the retroperitoneal, soft tissue mass. This referral was made in Spring 2019, and following an evaluation at  (which included an MRI to make sure the mass did not appear to involve the great vessels),  urology ultimately took her to the OR on 07/22/2019. This procedure went extremely well. Today, ~four (4) months later, she has recovered fully and continues to have no complaints other than her longstanding back pains. She followed up with  urology on 10/30/2019 and repeat CT scans at that time (summarized above) showed no definite evidence of residual/recurrent disease. She will follow up with  urology again in ~eight months (late July 2020), as previously planned, with repeat CTs of the chest, abdomen and pelvis. Because of the slight reincrease in size of one of the nonspecific lung nodules, we will see her back in our clinic in six months (~May 2020) with a repeat CT of the chest.  2. Back pain: A chronic issue which the retroperitoneal foci from issue #1 was likely exacerbating; but which, now that the mass has been excised, is likely secondary to longstanding arthritis. Our clinic has agreed to provide her with her pain medicine. Continue prn Percocet 7.5/325 mg q6hr prn. A refill was provided today. Continue to monitor.  The patient and her daughter were in agreement with these plans.    It is a pleasure to participate in Ms. Berman's care. Please do not hesitate to call with any questions or concerns that you may have.    A total of 30 minutes were spent coordinating this patient’s care in clinic today; more than 50% of this time was face-to-face with the patient and her daughter, reviewing her interim medical history, discussing the results of last month's CT scans and counseling on the current followup plan. All questions were answered to their satisfaction.    FOLLOW UP  Refill of Percocet 7.5 mg PO q6hr prn disp #120 provided today. With  urology  in ~July 2020 with repeat imaging, as previously planned. Return to our clinic in 6 months (~late May 2020) with a CBC, CMP and CT of the chest without contrast.            This document was electronically signed by GABY Graham MD November 19, 2019 3:06 PM      CC: ANGELA Clark MD

## 2020-01-24 RX ORDER — OXYCODONE AND ACETAMINOPHEN 7.5; 325 MG/1; MG/1
1 TABLET ORAL EVERY 6 HOURS PRN
Qty: 120 TABLET | Refills: 0 | Status: SHIPPED | OUTPATIENT
Start: 2020-01-24 | End: 2020-03-19 | Stop reason: SDUPTHER

## 2020-03-19 ENCOUNTER — TELEPHONE (OUTPATIENT)
Dept: ONCOLOGY | Facility: HOSPITAL | Age: 70
End: 2020-03-19

## 2020-03-19 DIAGNOSIS — C64.9 RENAL CELL CARCINOMA, UNSPECIFIED LATERALITY (HCC): Primary | ICD-10-CM

## 2020-03-19 DIAGNOSIS — R52 PAIN: ICD-10-CM

## 2020-03-19 DIAGNOSIS — C64.9 CARCINOMA OF KIDNEY, UNSPECIFIED LATERALITY (HCC): ICD-10-CM

## 2020-03-19 RX ORDER — OXYCODONE AND ACETAMINOPHEN 7.5; 325 MG/1; MG/1
1 TABLET ORAL EVERY 6 HOURS PRN
Qty: 120 TABLET | Refills: 0 | Status: SHIPPED | OUTPATIENT
Start: 2020-03-19 | End: 2020-05-21 | Stop reason: SDUPTHER

## 2020-03-19 NOTE — TELEPHONE ENCOUNTER
Called to inform patient that Dr. Graham approved her percocet medication refill, and that printed script is ready for  at the reception window.  Pt verbalized understanding.

## 2020-03-19 NOTE — TELEPHONE ENCOUNTER
----- Message from Alonzo Bajwa MA sent at 3/19/2020  1:58 PM EDT -----  PT is needing refills on her pain medication.  Thank you

## 2020-05-14 ENCOUNTER — APPOINTMENT (OUTPATIENT)
Dept: CT IMAGING | Facility: HOSPITAL | Age: 70
End: 2020-05-14

## 2020-05-20 ENCOUNTER — HOSPITAL ENCOUNTER (OUTPATIENT)
Dept: CT IMAGING | Facility: HOSPITAL | Age: 70
Discharge: HOME | End: 2020-05-20
Admitting: INTERNAL MEDICINE

## 2020-05-20 DIAGNOSIS — C64.9 CARCINOMA OF KIDNEY, UNSPECIFIED LATERALITY (HCC): ICD-10-CM

## 2020-05-20 PROCEDURE — 71250 CT THORAX DX C-: CPT

## 2020-05-20 PROCEDURE — 71250 CT THORAX DX C-: CPT | Performed by: RADIOLOGY

## 2020-05-21 ENCOUNTER — OFFICE VISIT (OUTPATIENT)
Dept: ONCOLOGY | Facility: CLINIC | Age: 70
End: 2020-05-21

## 2020-05-21 ENCOUNTER — LAB (OUTPATIENT)
Dept: ONCOLOGY | Facility: CLINIC | Age: 70
End: 2020-05-21

## 2020-05-21 VITALS
HEART RATE: 60 BPM | SYSTOLIC BLOOD PRESSURE: 151 MMHG | BODY MASS INDEX: 27.87 KG/M2 | WEIGHT: 167.5 LBS | DIASTOLIC BLOOD PRESSURE: 90 MMHG | TEMPERATURE: 97.5 F | RESPIRATION RATE: 18 BRPM | OXYGEN SATURATION: 99 %

## 2020-05-21 DIAGNOSIS — C64.9 CARCINOMA OF KIDNEY, UNSPECIFIED LATERALITY (HCC): Primary | ICD-10-CM

## 2020-05-21 DIAGNOSIS — C64.9 CARCINOMA OF KIDNEY, UNSPECIFIED LATERALITY (HCC): ICD-10-CM

## 2020-05-21 DIAGNOSIS — C64.9 RENAL CELL CARCINOMA, UNSPECIFIED LATERALITY (HCC): ICD-10-CM

## 2020-05-21 DIAGNOSIS — R52 PAIN: ICD-10-CM

## 2020-05-21 LAB
ALBUMIN SERPL-MCNC: 4.25 G/DL (ref 3.5–5.2)
ALBUMIN/GLOB SERPL: 1.1 G/DL
ALP SERPL-CCNC: 124 U/L (ref 39–117)
ALT SERPL W P-5'-P-CCNC: <5 U/L (ref 1–33)
ANION GAP SERPL CALCULATED.3IONS-SCNC: 14.8 MMOL/L (ref 5–15)
AST SERPL-CCNC: 11 U/L (ref 1–32)
BASOPHILS # BLD AUTO: 0.07 10*3/MM3 (ref 0–0.2)
BASOPHILS NFR BLD AUTO: 1 % (ref 0–1.5)
BILIRUB SERPL-MCNC: 0.6 MG/DL (ref 0.2–1.2)
BUN BLD-MCNC: 19 MG/DL (ref 8–23)
BUN/CREAT SERPL: 14.6 (ref 7–25)
CALCIUM SPEC-SCNC: 9.2 MG/DL (ref 8.6–10.5)
CHLORIDE SERPL-SCNC: 102 MMOL/L (ref 98–107)
CO2 SERPL-SCNC: 22.2 MMOL/L (ref 22–29)
CREAT BLD-MCNC: 1.3 MG/DL (ref 0.57–1)
DEPRECATED RDW RBC AUTO: 40.9 FL (ref 37–54)
EOSINOPHIL # BLD AUTO: 0.02 10*3/MM3 (ref 0–0.4)
EOSINOPHIL NFR BLD AUTO: 0.3 % (ref 0.3–6.2)
ERYTHROCYTE [DISTWIDTH] IN BLOOD BY AUTOMATED COUNT: 11.8 % (ref 12.3–15.4)
GFR SERPL CREATININE-BSD FRML MDRD: 49 ML/MIN/1.73
GLOBULIN UR ELPH-MCNC: 4 GM/DL
GLUCOSE BLD-MCNC: 132 MG/DL (ref 65–99)
HCT VFR BLD AUTO: 44.3 % (ref 34–46.6)
HGB BLD-MCNC: 14.2 G/DL (ref 12–15.9)
IMM GRANULOCYTES # BLD AUTO: 0.01 10*3/MM3 (ref 0–0.05)
IMM GRANULOCYTES NFR BLD AUTO: 0.1 % (ref 0–0.5)
LYMPHOCYTES # BLD AUTO: 2.21 10*3/MM3 (ref 0.7–3.1)
LYMPHOCYTES NFR BLD AUTO: 32 % (ref 19.6–45.3)
MCH RBC QN AUTO: 30 PG (ref 26.6–33)
MCHC RBC AUTO-ENTMCNC: 32.1 G/DL (ref 31.5–35.7)
MCV RBC AUTO: 93.5 FL (ref 79–97)
MONOCYTES # BLD AUTO: 0.49 10*3/MM3 (ref 0.1–0.9)
MONOCYTES NFR BLD AUTO: 7.1 % (ref 5–12)
NEUTROPHILS # BLD AUTO: 4.11 10*3/MM3 (ref 1.7–7)
NEUTROPHILS NFR BLD AUTO: 59.5 % (ref 42.7–76)
NRBC BLD AUTO-RTO: 0 /100 WBC (ref 0–0.2)
PLATELET # BLD AUTO: 282 10*3/MM3 (ref 140–450)
PMV BLD AUTO: 10.7 FL (ref 6–12)
POTASSIUM BLD-SCNC: 4.4 MMOL/L (ref 3.5–5.2)
PROT SERPL-MCNC: 8.2 G/DL (ref 6–8.5)
RBC # BLD AUTO: 4.74 10*6/MM3 (ref 3.77–5.28)
SODIUM BLD-SCNC: 139 MMOL/L (ref 136–145)
WBC NRBC COR # BLD: 6.91 10*3/MM3 (ref 3.4–10.8)

## 2020-05-21 PROCEDURE — 99214 OFFICE O/P EST MOD 30 MIN: CPT | Performed by: INTERNAL MEDICINE

## 2020-05-21 PROCEDURE — 80053 COMPREHEN METABOLIC PANEL: CPT | Performed by: INTERNAL MEDICINE

## 2020-05-21 PROCEDURE — 85025 COMPLETE CBC W/AUTO DIFF WBC: CPT | Performed by: INTERNAL MEDICINE

## 2020-05-21 RX ORDER — ATENOLOL 50 MG/1
TABLET ORAL
COMMUNITY
Start: 2020-05-15

## 2020-05-21 RX ORDER — OXYCODONE AND ACETAMINOPHEN 7.5; 325 MG/1; MG/1
1 TABLET ORAL EVERY 6 HOURS PRN
Qty: 120 TABLET | Refills: 0 | Status: SHIPPED | OUTPATIENT
Start: 2020-05-21 | End: 2020-08-12 | Stop reason: SDUPTHER

## 2020-05-21 NOTE — PROGRESS NOTES
"  Name:  Nancy Berman  :  1950  Date:  2020     REFERRING PROVIDER  ANGELA Clark    PRIMARY CARE PROVIDER  Julee Arechiga APRN    REASON FOR FOLLOWUP  1. Carcinoma of kidney, unspecified laterality (CMS/HCC)    2. Renal cell carcinoma, unspecified laterality (CMS/HCC)    3. Pain      CHIEF COMPLAINT  Chronic low back pain, still manageable with prn Percocet.    Dear Ms. Michelsnut,    HISTORY OF PRESENT ILLNESS:   I saw Ms. Berman in follow up today in our medical oncology clinic. As you are aware, she is a pleasant, 70 y.o.,  female with a history of hypertension, diabetes and chronic kidney disease who was initially diagnosed with, and underwent an uncomplicated resection of, a left-sided kidney cancer in . Adjuvant therapy was not indicated. Ultimately, she had been doing very well from the standpoint of this disease ever since then (for the past ten years). However, in early summer 2018, as part of a routine follow up evaluation for her chronic kidney disease (of her remaining, right kidney), you ordered a renal ultrasound. Unexpectedly, this study identified a retroperitoneal mass; and subsequent CT scans (performed in late 2018) showed \"nodular foci in the retroperitoneum\", a \"heterogeneous lesion inferior in the right lobe of the liver\" and \"small, tiny peripheral densities in the lungs most noticeable on the right\", consistent with more widespread and metastatic disease. She underwent a CT-guided biopsy of a retroperitoneal mass at  on 2018, and the pathology results were consistent with recurrent, renal cell carcinoma. She was subsequently referred to our clinic for further evaluation and management.    INTERIM HISTORY:  Ms. Berman returns to clinic today for follow up by herself. She has a history of longstanding back pain (which, for years, has had a tendency to worsen when she stands up to wash dishes and forces her to sit down " "periodically to rest) and fatigue. Her back pain remains controlled on her current schedule of prn Percocet (when she is taking it; she occasionally runs out of her supply and \"forgets\" to get it refilled in a timely fashion). She underwent resection of her recurrent, retroperitoneal mass by  urology on 07/22/2019. This procedure went very well, and she has recovered nicely. Other than her ongoing, chronic back pain, she again has no specific complaints and continues to feel overall well.    Past Medical History:   Diagnosis Date   • Arthritis    • Cancer (CMS/HCC)     left Kidney   • Chronic kidney disease, stage III (moderate) (CMS/HCC)    • Diabetes mellitus (CMS/HCC)    • High cholesterol    • Hypertension    • Low back pain    • Spinal stenosis        Past Surgical History:   Procedure Laterality Date   • BLADDER SURGERY     • HYSTERECTOMY     • KIDNEY SURGERY Left     Nephrectomy for cancerous mass   • ME OPEN BHAVANA FIXATN HUMERAL SHAFT FX Left 4/20/2017    Procedure: LEFT HUMERUS INTRAMEDULLARY NAIL/BHAVANA INSERTION;  Surgeon: Jose Willard MD;  Location: [facility];  Service: Orthopedics       Social History     Socioeconomic History   • Marital status: Legally      Spouse name: Not on file   • Number of children: Not on file   • Years of education: Not on file   • Highest education level: Not on file   Tobacco Use   • Smoking status: Never Smoker   • Smokeless tobacco: Never Used   Substance and Sexual Activity   • Alcohol use: Yes     Comment: social   • Drug use: Defer   • Sexual activity: Defer       Family History   Problem Relation Age of Onset   • Parkinsonism Mother    • Heart disease Father    • Diabetes Father    • Cancer Brother        Allergies   Allergen Reactions   • Sulfa Antibiotics        Current Outpatient Medications   Medication Sig Dispense Refill   • amLODIPine (NORVASC) 5 MG tablet Take 5 mg by mouth Daily.     • aspirin 325 MG tablet Take 325 mg by mouth daily.     • " atenolol (TENORMIN) 50 MG tablet      • carbidopa-levodopa (SINEMET)  MG per tablet Take 1 tablet by mouth 3 (Three) Times a Day.     • gabapentin (NEURONTIN) 600 MG tablet Take 600 mg by mouth 3 (three) times a day.     • metFORMIN XR (GLUCOPHAGE-XR) 500 MG 24 hr tablet Take 500 mg by mouth daily with breakfast.     • oxyCODONE-acetaminophen (PERCOCET) 7.5-325 MG per tablet Take 1 tablet by mouth Every 6 (Six) Hours As Needed for Moderate Pain . 120 tablet 0     No current facility-administered medications for this visit.      REVIEW OF SYSTEMS  CONSTITUTIONAL:  No fever, chills or night sweats. Chronic fatigue, stable.  EYES:  No blurry vision, diplopia or other vision changes.  ENT:  No hearing loss, nosebleeds or sore throat.  CARDIOVASCULAR:  No palpitations, arrhythmia, syncopal episodes or edema.  PULMONARY:  No hemoptysis, wheezing, chronic cough or shortness of breath.  GASTROINTESTINAL:  No nausea or vomiting.  No constipation or diarrhea. No abdominal pain.  GENITOURINARY:  No hematuria or kidney stones. Recent burning with urination, for which she is starting an antibiotic today, as per the HPI above.  MUSCULOSKELETAL:  Chronic back pain, radiating slightly into her right flank, currently still well controlled (when on Percocet), as per the HPI above.  INTEGUMENTARY: No rashes or pruritus.  ENDOCRINE:  No excessive thirst or hot flashes.  HEMATOLOGIC:  No history of free bleeding, spontaneous bleeding or clotting.  IMMUNOLOGIC:  No allergies or frequent infections.  NEUROLOGIC: No numbness, tingling, seizures or weakness.  PSYCHIATRIC:  Mild chronic anxiety, stable.    PHYSICAL EXAMINATION  /90   Pulse 60   Temp 97.5 °F (36.4 °C) (Oral)   Resp 18   Wt 76 kg (167 lb 8 oz)   SpO2 99%   BMI 27.87 kg/m²     GENERAL:  A well-developed, well-nourished, elderly,  female in no acute distress, wearing a face mask.  HEENT:  Pupils equally round and reactive to light. Extraocular  muscles intact.  CARDIOVASCULAR:  Regular rate and rhythm.  No murmurs, gallops or rubs.  LUNGS:  Clear to auscultation bilaterally.  ABDOMEN:  Soft, nontender, nondistended with positive bowel sounds. Well-healed abdominopelvic surgical scars.  EXTREMITIES:  No clubbing, cyanosis or edema bilaterally.  SKIN:  No rashes or petechiae.  NEURO:  Cranial nerves grossly intact.  No focal deficits.  PSYCH:  Alert and oriented x3.    LABORATORY    Lab Results   Component Value Date    WBC 6.91 05/21/2020    HGB 14.2 05/21/2020    HCT 44.3 05/21/2020    MCV 93.5 05/21/2020     05/21/2020    NEUTROABS 4.11 05/21/2020       Lab Results   Component Value Date     10/22/2019    K 4.1 10/22/2019     10/22/2019    CO2 22.6 10/22/2019    BUN 19 10/22/2019    CREATININE 1.04 (H) 10/22/2019    GLUCOSE 153 (H) 10/22/2019    CALCIUM 9.4 10/22/2019    AST 17 10/22/2019    ALT <5 10/22/2019    ALKPHOS 125 (H) 10/22/2019    BILITOT 0.3 10/22/2019    PROTEINTOT 8.2 10/22/2019    ALBUMIN 4.14 10/22/2019     CBC (05/21/2020): WBCs: 6.9; HgB: 14.2; Hct: 44.3; platelets: 282; MCV: 93.5  CBC (10/22/2019): WBCs: 6.1; HgB: 12.8; Hct: 39.1; platelets: 318; MCV: 92.9  CBC (06/14/2019): WBCs: 4.7; HgB: 12.7; Hct: 38.7; platelets: 252; MCV: 95.8  CBC (03/05/2019): WBCs: 5.9; HgB: 13.6; Hct: 41.4; platelets: 264; MCV: 99.0  CBC (11/20/2018): WBCs: 5.3; HgB: 13.0; Hct: 40.4; platelets: 243; MCV: 96.9  CBC (08/23/2018): WBCs: 5.7; HgB: 13.8; Hct: 43.4; platelets: 248    IMAGING  CT chest, abdomen and pelvis with contrast (06/30/2018, at ):  Impression:  1) Small, tiny peripheral densities in the lungs most noticeable on the right.  2) Heterogeneous lesion inferior in the right lobe of the liver.  3) Heterogeneous enhancing nodular foci in the retroperitoneum.  4) Vascular calcifications with moderate coronary artery disease.  5) Moderate degenerative changes in the spine with moderate spinal canal stenosis.    CT chest without  contrast (08/16/2018):  Impression: Negative CT scan of the thorax. Nothing is seen to suggest metastatic disease in the chest.    CT abdomen and pelvis without contrast (08/16/2018):  Impression: Patient status post right nephrectomy. There is a low density mass in the retroperitoneum that would be consistent with metastatic disease to the retroperitoneal lymph nodes possibly involving the inferior vena cava. It measures approximately 3.8 cm in diameter.    NM bone scan, whole body (09/27/2018):  Impression: There are no findings to suggest metastatic disease or to explain the patient's symptoms.    CT chest without contrast (11/20/2018):  Impression: Stable CT scan of the thorax. Nothing seen to suggest metastatic disease. There is fairly heavy coronary artery calcification. There were no pulmonary parenchymal lung nodules or pleural effusions. No enlarged lymph nodes were demonstrated.    CT abdomen and pelvis without contrast (11/20/2018):  Impression:  Patient status post right nephrectomy. There continues to be some abnormal soft tissue density in the retroperitoneum in the pericaval area concerning for retroperitoneal lymphadenopathy. This is completely unchanged, however, from 08/2018 exam.    CT abdomen and pelvis without contrast (03/05/2019):  Impression: Stable appearance of the abdomen including soft tissue density mass in the retroperitoneal region. There continues to be some abnormal nearly 7 cm soft tissue density in the retroperitoneum in the pericaval area that remains stable.    CT chest, abdomen and pelvis with contrast (10/30/2019):  Impression: A noncalcified nodule right lower lobe on image 54 series 3 measures 7 x 6 mm and appears slightly larger, previously measuring 6 x 4 mm. No new nodules. Interval resection of retroperitoneal maxwell masses. Stable, lobulated mass in the lower right hepatic lobe.    CT chest without contrast (05/20/2020):  Impression:  1) No parenchymal soft tissue nodules  or masses.  2) No pericardial or pleural effusions.  3) No adenopathy.    PATHOLOGY  Right kidney, radical nephrectomy (11/05/2008, per pathology report; outside slides):  Renal cell carcinoma, clear cell type. Unremarkable adrenal gland.    Right retroperitoneal mass (07/12/2018):  Positive for malignancy; consistent with recurrent renal cell carcinoma. PAX8 is strongly positive in tumor cells, and Inhibin is negative.    Retroperitoneal mass, resection (07/22/2019):  Metastatic renal cell carcinoma.    IMPRESSION AND PLAN  Ms. Berman is a 70 y.o.,  female with:  1. Renal cell carcinoma: Initially diagnosed in 2008 and status post a left-sided nephrectomy, with no signs/symptoms of recurrent disease for the next ten years. Unfortunately, as of June 2018, this was/is no longer the case. CT scans performed at  on 06/30/2018 identified multiple foci in the retroperitoneum (along with possible lung lesions; although lung imaging since then has been less concerning), consistent with (and CT-guided FNA confirmed) recurrent, and now metastatic, disease. I have had multiple, long discussions with the patient and her daughters since the time of her initial presentation in our clinic (on 07/25/2018) regarding this diagnosis and, in general terms, its prognosis. In short, they remain aware that this disease is likely no longer curable; but that, particularly given her good performance status, it was (and remains) treatable. While there are have always been multiple, systemic options available to us (including PO TKIs, such as pazopanib, and IV immunotherapy, such as qmonthly nivolumab), it has continued to be reasonable to defer starting any specific, systemic treatment to date. Renal cell carcinoma is typically slow growing, and this appears to especially be the case here. She did not relapse for ten years, the CT scans performed in late June 2018 at  showed a low, tumor burden (and this continued to be  the case from that point); and she was (and remains) minimally (other than issue #2) symptomatic. In general, the longer an elderly patient with multiple comobidities can avoid the potential side effects of palliative treatment, the better. Given her continued, very stable, oligometastatic disease, by early 2019 it seemed worthwhile to send her back to urology for reevaluation to consider resecting the retroperitoneal, soft tissue mass. This referral was made in Spring 2019, and following an evaluation at  (which included an MRI to make sure the mass did not appear to involve the great vessels),  urology ultimately took her to the OR on 07/22/2019. This procedure went extremely well. Today, going on one (1) year later, she has recovered fully and continues to have no complaints other than her longstanding back pains. She followed up with  urology on 10/30/2019 and repeat CT scans at that time (summarized above) showed no definite evidence of residual/recurrent disease. This continues to be the case on a repeat CT of the chest performed here on 05/20/2020 (and also summarized above). She will follow up with  urology again in late July, as previously planned, with repeat CTs of the chest, abdomen and pelvis. We will see her back in our clinic in three months (~mid-August 2020), shortly after this appointment.  2. Back pain: A chronic issue which the retroperitoneal foci from issue #1 was likely exacerbating; but which, now that the mass has been excised, is likely secondary to longstanding arthritis. Our clinic has agreed to provide her with her pain medicine. Continue prn Percocet 7.5/325 mg q6hr prn. A refill was provided today. Continue to monitor.  The patient was in agreement with these plans.    It is a pleasure to participate in Ms. Berman's care. Please do not hesitate to call with any questions or concerns that you may have.    A total of 30 minutes were spent coordinating this patient’s care in  clinic today; more than 50% of this time was face-to-face with the patient, reviewing her interim medical history, discussing the results of yesterday's CT of the chest and counseling on the current followup plan. All questions were answered to her satisfaction.    FOLLOW UP  Refill of Percocet 7.5 mg PO q6hr prn disp #120 provided today. With  urology in ~July 2020 with repeat imaging, as previously planned. Return to our clinic in 3 months (~mid to late August 2020), shortly after this appointment.            This document was electronically signed by GABY Graham MD May 21, 2020 11:23      CC: ANGELA Clark MD

## 2020-08-12 DIAGNOSIS — C64.9 RENAL CELL CARCINOMA, UNSPECIFIED LATERALITY (HCC): ICD-10-CM

## 2020-08-12 DIAGNOSIS — R52 PAIN: ICD-10-CM

## 2020-08-12 DIAGNOSIS — C64.9 CARCINOMA OF KIDNEY, UNSPECIFIED LATERALITY (HCC): ICD-10-CM

## 2020-08-12 RX ORDER — OXYCODONE AND ACETAMINOPHEN 7.5; 325 MG/1; MG/1
1 TABLET ORAL EVERY 6 HOURS PRN
Qty: 120 TABLET | Refills: 0 | Status: SHIPPED | OUTPATIENT
Start: 2020-08-12 | End: 2020-10-29 | Stop reason: SDUPTHER

## 2020-08-12 NOTE — TELEPHONE ENCOUNTER
----- Message from Alonzo Bajwa MA sent at 8/12/2020  8:05 AM EDT -----  Pt called and said she needed her pain medication called into [facility].  Thank you!

## 2020-10-29 DIAGNOSIS — C64.9 CARCINOMA OF KIDNEY, UNSPECIFIED LATERALITY (HCC): ICD-10-CM

## 2020-10-29 DIAGNOSIS — R52 PAIN: ICD-10-CM

## 2020-10-29 DIAGNOSIS — C64.9 RENAL CELL CARCINOMA, UNSPECIFIED LATERALITY (HCC): ICD-10-CM

## 2020-10-29 RX ORDER — OXYCODONE AND ACETAMINOPHEN 7.5; 325 MG/1; MG/1
1 TABLET ORAL EVERY 6 HOURS PRN
Qty: 120 TABLET | Refills: 0 | Status: SHIPPED | OUTPATIENT
Start: 2020-10-29 | End: 2020-12-11 | Stop reason: SDUPTHER

## 2020-11-20 NOTE — TELEPHONE ENCOUNTER
----- Message from Linda Camara sent at 7/16/2019 10:24 AM EDT -----  Regarding: REFILL REQUEST  Contact: [phone]  The patient is requesting a refill of her pain meds.    
Percocet refill approved by Dr. Graham, and printed Rx is ready at reception window.  Phoned to patient to let her know script is ready for ; pt verbalized understanding.   
no

## 2020-12-11 ENCOUNTER — OFFICE VISIT (OUTPATIENT)
Dept: ONCOLOGY | Facility: CLINIC | Age: 70
End: 2020-12-11

## 2020-12-11 VITALS
TEMPERATURE: 96.9 F | DIASTOLIC BLOOD PRESSURE: 91 MMHG | RESPIRATION RATE: 18 BRPM | OXYGEN SATURATION: 97 % | BODY MASS INDEX: 28.12 KG/M2 | WEIGHT: 169 LBS | SYSTOLIC BLOOD PRESSURE: 169 MMHG | HEART RATE: 59 BPM

## 2020-12-11 DIAGNOSIS — R52 PAIN: ICD-10-CM

## 2020-12-11 DIAGNOSIS — C64.2 CARCINOMA OF LEFT KIDNEY (HCC): Primary | ICD-10-CM

## 2020-12-11 DIAGNOSIS — C64.9 RENAL CELL CARCINOMA, UNSPECIFIED LATERALITY (HCC): ICD-10-CM

## 2020-12-11 DIAGNOSIS — C64.9 CARCINOMA OF KIDNEY, UNSPECIFIED LATERALITY (HCC): ICD-10-CM

## 2020-12-11 PROCEDURE — 99214 OFFICE O/P EST MOD 30 MIN: CPT | Performed by: INTERNAL MEDICINE

## 2020-12-11 PROCEDURE — 80053 COMPREHEN METABOLIC PANEL: CPT | Performed by: INTERNAL MEDICINE

## 2020-12-11 PROCEDURE — 85025 COMPLETE CBC W/AUTO DIFF WBC: CPT | Performed by: INTERNAL MEDICINE

## 2020-12-11 RX ORDER — OXYCODONE AND ACETAMINOPHEN 7.5; 325 MG/1; MG/1
1 TABLET ORAL EVERY 6 HOURS PRN
Qty: 120 TABLET | Refills: 0 | Status: SHIPPED | OUTPATIENT
Start: 2020-12-11 | End: 2021-01-22 | Stop reason: SDUPTHER

## 2020-12-11 NOTE — PROGRESS NOTES
"  Name:  Nancy Berman  :  1950  Date:  2020     REFERRING PROVIDER  ANGELA Clark    PRIMARY CARE PROVIDER  Julee Arechiga APRN    REASON FOR FOLLOWUP  1. Carcinoma of left kidney (CMS/HCC)      CHIEF COMPLAINT  Chronic low back pain and recently slightly worsening neck pain, still overall manageable with prn Percocet.    Dear Ms. Michelsnut,    HISTORY OF PRESENT ILLNESS:   I saw Ms. Berman in follow up today in our medical oncology clinic. As you are aware, she is a pleasant, 70 y.o.,  female with a history of hypertension, diabetes and chronic kidney disease who was initially diagnosed with, and underwent an uncomplicated resection of, a left-sided kidney cancer in . Adjuvant therapy was not indicated. Ultimately, she had been doing very well from the standpoint of this disease ever since then (for the past ten years). However, in early summer 2018, as part of a routine follow up evaluation for her chronic kidney disease (of her remaining, right kidney), you ordered a renal ultrasound. Unexpectedly, this study identified a retroperitoneal mass; and subsequent CT scans (performed in late 2018) showed \"nodular foci in the retroperitoneum\", a \"heterogeneous lesion inferior in the right lobe of the liver\" and \"small, tiny peripheral densities in the lungs most noticeable on the right\", consistent with more widespread and metastatic disease. She underwent a CT-guided biopsy of a retroperitoneal mass at  on 2018, and the pathology results were consistent with recurrent, renal cell carcinoma. She was subsequently referred to our clinic for further evaluation and management.    INTERIM HISTORY:  Ms. Berman returns to clinic today for follow up again accompanied by her daughter. She has a history of longstanding back pain (which, for years, has had a tendency to worsen when she stands up to wash dishes and forces her to sit down periodically to rest) " "and fatigue. Her low back pain remains controlled on her current schedule of prn Percocet (when she is taking it; she occasionally runs out of her supply and \"forgets\" to get it refilled in a timely fashion). She underwent resection of her recurrent, retroperitoneal mass by  urology on 07/22/2019. This procedure went very well, and she has recovered nicely. Her only new complaint today is that her neck has recently been hurting her more. She otherwise continues to feel the same as she usually does, and she has no other specific complaints.    Past Medical History:   Diagnosis Date   • Arthritis    • Cancer (CMS/HCC)     left Kidney   • Chronic kidney disease, stage III (moderate)    • Diabetes mellitus (CMS/HCC)    • High cholesterol    • Hypertension    • Low back pain    • Spinal stenosis        Past Surgical History:   Procedure Laterality Date   • BLADDER SURGERY     • HYSTERECTOMY     • KIDNEY SURGERY Left     Nephrectomy for cancerous mass   • MN OPEN BHAVANA FIXATN HUMERAL SHAFT FX Left 4/20/2017    Procedure: LEFT HUMERUS INTRAMEDULLARY NAIL/BHAVANA INSERTION;  Surgeon: Jose Willard MD;  Location: [facility];  Service: Orthopedics       Social History     Socioeconomic History   • Marital status: Legally      Spouse name: Not on file   • Number of children: Not on file   • Years of education: Not on file   • Highest education level: Not on file   Tobacco Use   • Smoking status: Never Smoker   • Smokeless tobacco: Never Used   Substance and Sexual Activity   • Alcohol use: Yes     Comment: social   • Drug use: Defer   • Sexual activity: Defer       Family History   Problem Relation Age of Onset   • Parkinsonism Mother    • Heart disease Father    • Diabetes Father    • Cancer Brother        Allergies   Allergen Reactions   • Sulfa Antibiotics        Current Outpatient Medications   Medication Sig Dispense Refill   • amLODIPine (NORVASC) 5 MG tablet Take 5 mg by mouth Daily.     • atenolol " (TENORMIN) 50 MG tablet      • aspirin 325 MG tablet Take 325 mg by mouth daily.     • carbidopa-levodopa (SINEMET)  MG per tablet Take 1 tablet by mouth 3 (Three) Times a Day.     • gabapentin (NEURONTIN) 600 MG tablet Take 600 mg by mouth 3 (three) times a day.     • metFORMIN XR (GLUCOPHAGE-XR) 500 MG 24 hr tablet Take 500 mg by mouth daily with breakfast.     • oxyCODONE-acetaminophen (PERCOCET) 7.5-325 MG per tablet Take 1 tablet by mouth Every 6 (Six) Hours As Needed for Moderate Pain . 120 tablet 0     No current facility-administered medications for this visit.      REVIEW OF SYSTEMS  CONSTITUTIONAL:  No fever, chills or night sweats. Chronic fatigue, stable.  EYES:  No blurry vision, diplopia or other vision changes.  ENT:  No hearing loss, nosebleeds or sore throat.  CARDIOVASCULAR:  No palpitations, arrhythmia, syncopal episodes or edema.  PULMONARY:  No hemoptysis, wheezing, chronic cough or shortness of breath.  GASTROINTESTINAL:  No nausea or vomiting.  No constipation or diarrhea. No abdominal pain.  GENITOURINARY:  No hematuria or kidney stones. Recent burning with urination, for which she is starting an antibiotic today, as per the HPI above.  MUSCULOSKELETAL:  As per the HPI above.  INTEGUMENTARY: No rashes or pruritus.  ENDOCRINE:  No excessive thirst or hot flashes.  HEMATOLOGIC:  No history of free bleeding, spontaneous bleeding or clotting.  IMMUNOLOGIC:  No allergies or frequent infections.  NEUROLOGIC: No numbness, tingling, seizures or weakness.  PSYCHIATRIC:  Mild chronic anxiety, stable.    PHYSICAL EXAMINATION  /91   Pulse 59   Temp 96.9 °F (36.1 °C) (Temporal)   Resp 18   Wt 76.7 kg (169 lb)   SpO2 97%   BMI 28.12 kg/m²     GENERAL:  A well-developed, well-nourished, elderly,  female in no acute distress.  HEENT:  Pupils equally round and reactive to light. Extraocular muscles intact.  CARDIOVASCULAR:  Regular rate and rhythm. No murmurs, gallops or  rubs.  LUNGS:  Clear to auscultation bilaterally.  ABDOMEN:  Soft, nontender, nondistended with positive bowel sounds.  EXTREMITIES:  No clubbing, cyanosis or edema bilaterally.  SKIN:  No rashes or petechiae.  NEURO:  Cranial nerves grossly intact. No focal deficits.  PSYCH:  Alert and oriented x3.    LABORATORY    Lab Results   Component Value Date    WBC 7.25 12/11/2020    HGB 13.2 12/11/2020    HCT 41.0 12/11/2020    MCV 92.8 12/11/2020     12/11/2020    NEUTROABS 3.28 12/11/2020       Lab Results   Component Value Date     12/11/2020    K 4.3 12/11/2020     12/11/2020    CO2 19.5 (L) 12/11/2020    BUN 21 12/11/2020    CREATININE 1.11 (H) 12/11/2020    GLUCOSE 156 (H) 12/11/2020    CALCIUM 9.4 12/11/2020    AST 15 12/11/2020    ALT 5 12/11/2020    ALKPHOS 96 12/11/2020    BILITOT 0.3 12/11/2020    PROTEINTOT 7.6 12/11/2020    ALBUMIN 4.23 12/11/2020     CBC (12/11/2020): WBCs: 7.2; HgB: 13.2; Hct: 41.0; platelets: 290; MCV: 92.8  CBC (05/21/2020): WBCs: 6.9; HgB: 14.2; Hct: 44.3; platelets: 282; MCV: 93.5  CBC (10/22/2019): WBCs: 6.1; HgB: 12.8; Hct: 39.1; platelets: 318; MCV: 92.9  CBC (06/14/2019): WBCs: 4.7; HgB: 12.7; Hct: 38.7; platelets: 252; MCV: 95.8  CBC (03/05/2019): WBCs: 5.9; HgB: 13.6; Hct: 41.4; platelets: 264; MCV: 99.0  CBC (11/20/2018): WBCs: 5.3; HgB: 13.0; Hct: 40.4; platelets: 243; MCV: 96.9  CBC (08/23/2018): WBCs: 5.7; HgB: 13.8; Hct: 43.4; platelets: 248    IMAGING  CT chest, abdomen and pelvis with contrast (06/30/2018, at ):  Impression:  1) Small, tiny peripheral densities in the lungs most noticeable on the right.  2) Heterogeneous lesion inferior in the right lobe of the liver.  3) Heterogeneous enhancing nodular foci in the retroperitoneum.  4) Vascular calcifications with moderate coronary artery disease.  5) Moderate degenerative changes in the spine with moderate spinal canal stenosis.    CT chest without contrast (08/16/2018):  Impression: Negative CT scan of  the thorax. Nothing is seen to suggest metastatic disease in the chest.    CT abdomen and pelvis without contrast (08/16/2018):  Impression: Patient status post right nephrectomy. There is a low density mass in the retroperitoneum that would be consistent with metastatic disease to the retroperitoneal lymph nodes possibly involving the inferior vena cava. It measures approximately 3.8 cm in diameter.    NM bone scan, whole body (09/27/2018):  Impression: There are no findings to suggest metastatic disease or to explain the patient's symptoms.    CT chest without contrast (11/20/2018):  Impression: Stable CT scan of the thorax. Nothing seen to suggest metastatic disease. There is fairly heavy coronary artery calcification. There were no pulmonary parenchymal lung nodules or pleural effusions. No enlarged lymph nodes were demonstrated.    CT abdomen and pelvis without contrast (11/20/2018):  Impression:  Patient status post right nephrectomy. There continues to be some abnormal soft tissue density in the retroperitoneum in the pericaval area concerning for retroperitoneal lymphadenopathy. This is completely unchanged, however, from 08/2018 exam.    CT abdomen and pelvis without contrast (03/05/2019):  Impression: Stable appearance of the abdomen including soft tissue density mass in the retroperitoneal region. There continues to be some abnormal nearly 7 cm soft tissue density in the retroperitoneum in the pericaval area that remains stable.    CT chest, abdomen and pelvis with contrast (10/30/2019):  Impression: A noncalcified nodule right lower lobe on image 54 series 3 measures 7 x 6 mm and appears slightly larger, previously measuring 6 x 4 mm. No new nodules. Interval resection of retroperitoneal maxwell masses. Stable, lobulated mass in the lower right hepatic lobe.    CT chest without contrast (05/20/2020):  Impression:  1) No parenchymal soft tissue nodules or masses.  2) No pericardial or pleural effusions.  3)  No adenopathy.    CT chest, abdomen and pelvis with contrast (08/05/2020, compared to 10/30/2019, at ):  Impression: No evidence of disease progression with unchanged 8 mm noncalcified right lower lobe pulmonary nodule. Unchanged, lobulated mass in the right hemiliver. No evidence of disease progression.    PATHOLOGY  Right kidney, radical nephrectomy (11/05/2008, per pathology report; outside slides):  Renal cell carcinoma, clear cell type. Unremarkable adrenal gland.    Right retroperitoneal mass (07/12/2018):  Positive for malignancy; consistent with recurrent renal cell carcinoma. PAX8 is strongly positive in tumor cells, and Inhibin is negative.    Retroperitoneal mass, resection (07/22/2019):  Metastatic renal cell carcinoma.    IMPRESSION AND PLAN  Ms. Berman is a 70 y.o.,  female with:  1. Renal cell carcinoma: Initially diagnosed in 2008 and status post a left-sided nephrectomy, with no signs/symptoms of recurrent disease for the next ten years. Unfortunately, as of June 2018, this was/is no longer the case. CT scans performed at  on 06/30/2018 identified multiple foci in the retroperitoneum (along with possible lung lesions; although lung imaging since then has been less concerning), consistent with (and CT-guided FNA confirmed) recurrent, and now metastatic, disease. I have had multiple, long discussions with the patient and her daughters since the time of her initial presentation in our clinic (on 07/25/2018) regarding this diagnosis and, in general terms, its prognosis. In short, they remain aware that this disease is likely no longer curable; but that, particularly given her good performance status, it was (and remains) treatable. While there are have always been multiple, systemic options available to us (including PO TKIs, such as pazopanib, and IV immunotherapy, such as qmonthly nivolumab), it has continued to be reasonable to defer starting any specific, systemic treatment to  date. Renal cell carcinoma is typically slow growing, and this appears to especially be the case here. She did not relapse for ten years, the CT scans performed in late June 2018 at  showed a low, tumor burden (and this continued to be the case from that point); and she was (and remains) minimally (other than issue #2) symptomatic. In general, the longer an elderly patient with multiple comobidities can avoid the potential side effects of palliative treatment, the better. Given her continued, very stable, oligometastatic disease, by early 2019 it seemed worthwhile to send her back to urology for reevaluation to consider resecting the retroperitoneal, soft tissue mass. This referral was made in Spring 2019, and following an evaluation at  (which included an MRI to make sure the mass did not appear to involve the great vessels),  urology ultimately took her to the OR on 07/22/2019. This procedure went extremely well. Today, going on one and one half (1.5) years later, she has recovered fully and continues to have no complaints other than her chronic back pains. She most recently followed up with  urology on 08/05/2020, and repeat CT scans at that time (summarized above) again showed no definite evidence of residual/recurrent disease. She will follow up with  urology again in ~late August 2021, as previously planned, with repeat CTs of the chest, abdomen and pelvis; however, we will plan to repeat imaging here in ~February/March 2021. As her neck has recently been bothering her more, we will see her back in clinic in one month with a NM bone scan.  2. Back pain: A chronic issue which the retroperitoneal foci from issue #1 was likely exacerbating; but which, now that the mass has been excised, is likely primarily due to longstanding arthritis. As her neck pain has recently been a little worse, we will further evaluate with a NM bone scan and see her back in clinic in one month. Our clinic has agreed to provide  her with her pain medicine. Continue prn Percocet 7.5/325 mg q6hr prn. A refill was provided today. Continue to monitor.  The patient and her daughter were in agreement with these plans.    It is a pleasure to participate in Ms. Berman's care. Please do not hesitate to call with any questions or concerns that you may have.    A total of 30 minutes were spent coordinating this patient’s care in clinic today; more than 50% of this time was face-to-face with the patient and her daughter, reviewing her interim medical history, discussing the results of August's CT scans (performed at ) and counseling on the current evaluation and followup plan. All questions were answered to their satisfaction.    FOLLOW UP  Refill of Percocet 7.5 mg PO q6hr prn disp #120 provided today. With  urology in ~August 2021 with repeat imaging, as previously planned. Return to our clinic in 1 month with a NM bone scan. Repeat CTs of the chest, abdomen and pelvis in ~late February 2021.            This document was electronically signed by GABY Graham MD December 11, 2020 16:24 EST      CC: ANGELA Clark MD

## 2020-12-30 ENCOUNTER — HOSPITAL ENCOUNTER (OUTPATIENT)
Dept: NUCLEAR MEDICINE | Facility: HOSPITAL | Age: 70
Discharge: HOME | End: 2020-12-30

## 2020-12-30 DIAGNOSIS — C64.2 CARCINOMA OF LEFT KIDNEY (HCC): ICD-10-CM

## 2020-12-30 PROCEDURE — 78306 BONE IMAGING WHOLE BODY: CPT

## 2020-12-30 PROCEDURE — 0 TECHNETIUM OXIDRONATE KIT: Performed by: INTERNAL MEDICINE

## 2020-12-30 PROCEDURE — A9561 TC99M OXIDRONATE: HCPCS | Performed by: INTERNAL MEDICINE

## 2020-12-30 PROCEDURE — 78306 BONE IMAGING WHOLE BODY: CPT | Performed by: RADIOLOGY

## 2020-12-30 RX ADMIN — TECHNETIUM TC 99M OXIDRONATE 1 DOSE: 3.15 INJECTION, POWDER, LYOPHILIZED, FOR SOLUTION INTRAVENOUS at 09:26

## 2021-01-22 ENCOUNTER — OFFICE VISIT (OUTPATIENT)
Dept: ONCOLOGY | Facility: CLINIC | Age: 71
End: 2021-01-22

## 2021-01-22 VITALS
OXYGEN SATURATION: 98 % | DIASTOLIC BLOOD PRESSURE: 83 MMHG | HEART RATE: 61 BPM | WEIGHT: 171.4 LBS | SYSTOLIC BLOOD PRESSURE: 145 MMHG | TEMPERATURE: 98 F | RESPIRATION RATE: 18 BRPM | BODY MASS INDEX: 28.52 KG/M2

## 2021-01-22 DIAGNOSIS — C64.9 RENAL CELL CARCINOMA, UNSPECIFIED LATERALITY (HCC): ICD-10-CM

## 2021-01-22 DIAGNOSIS — C64.9 CARCINOMA OF KIDNEY, UNSPECIFIED LATERALITY (HCC): Primary | ICD-10-CM

## 2021-01-22 DIAGNOSIS — R52 PAIN: ICD-10-CM

## 2021-01-22 PROCEDURE — 99214 OFFICE O/P EST MOD 30 MIN: CPT | Performed by: INTERNAL MEDICINE

## 2021-01-22 RX ORDER — OXYCODONE AND ACETAMINOPHEN 7.5; 325 MG/1; MG/1
1 TABLET ORAL EVERY 6 HOURS PRN
Qty: 120 TABLET | Refills: 0 | Status: SHIPPED | OUTPATIENT
Start: 2021-01-22 | End: 2021-03-06 | Stop reason: SDUPTHER

## 2021-01-22 NOTE — PROGRESS NOTES
"  Name:  Nancy Berman  :  1950  Date:  2021     REFERRING PROVIDER  ANGELA Clark    PRIMARY CARE PROVIDER  Julee Arechiga APRN    REASON FOR FOLLOWUP  1. Carcinoma of kidney, unspecified laterality (CMS/HCC)      CHIEF COMPLAINT  Chronic low back pain and recently slightly worsening neck pain, still overall manageable with prn Percocet.    Dear Ms. Michelsnut,    HISTORY OF PRESENT ILLNESS:   I saw Ms. Berman in follow up today in our medical oncology clinic. As you are aware, she is a pleasant, 71 y.o.,  female with a history of hypertension, diabetes and chronic kidney disease who was initially diagnosed with, and underwent an uncomplicated resection of, a left-sided kidney cancer in . Adjuvant therapy was not indicated. Ultimately, she had been doing very well from the standpoint of this disease ever since then (for the past ten years). However, in early summer 2018, as part of a routine follow up evaluation for her chronic kidney disease (of her remaining, right kidney), you ordered a renal ultrasound. Unexpectedly, this study identified a retroperitoneal mass; and subsequent CT scans (performed in late 2018) showed \"nodular foci in the retroperitoneum\", a \"heterogeneous lesion inferior in the right lobe of the liver\" and \"small, tiny peripheral densities in the lungs most noticeable on the right\", consistent with more widespread and metastatic disease. She underwent a CT-guided biopsy of a retroperitoneal mass at  on 2018, and the pathology results were consistent with recurrent, renal cell carcinoma. She was subsequently referred to our clinic for further evaluation and management.    INTERIM HISTORY:  Ms. Berman returns to clinic today for follow up again accompanied by her daughter. She has a history of longstanding back pain (which, for years, has had a tendency to worsen when she stands up to wash dishes and forces her to sit down " "periodically to rest) and fatigue. Her low back pain remains controlled on her current schedule of prn Percocet (when she is taking it; she occasionally runs out of her supply and \"forgets\" to get it refilled in a timely fashion). She underwent resection of her recurrent, retroperitoneal mass by  urology on 07/22/2019. This procedure went very well, and she has recovered nicely. Her only recently new complaint has been that her neck has been bothering her a little more (compared to early last year). She otherwise continues to feel the same as she usually does, and she has no other specific complaints.    Past Medical History:   Diagnosis Date   • Arthritis    • Cancer (CMS/HCC)     left Kidney   • Chronic kidney disease, stage III (moderate) (CMS/HCC)    • Diabetes mellitus (CMS/HCC)    • High cholesterol    • Hypertension    • Low back pain    • Spinal stenosis        Past Surgical History:   Procedure Laterality Date   • BLADDER SURGERY     • HYSTERECTOMY     • KIDNEY SURGERY Left     Nephrectomy for cancerous mass   • MS OPEN BHAVANA FIXATN HUMERAL SHAFT FX Left 4/20/2017    Procedure: LEFT HUMERUS INTRAMEDULLARY NAIL/BHAVANA INSERTION;  Surgeon: Jose Willard MD;  Location: [facility];  Service: Orthopedics       Social History     Socioeconomic History   • Marital status: Legally      Spouse name: Not on file   • Number of children: Not on file   • Years of education: Not on file   • Highest education level: Not on file   Tobacco Use   • Smoking status: Never Smoker   • Smokeless tobacco: Never Used   Substance and Sexual Activity   • Alcohol use: Yes     Comment: social   • Drug use: Defer   • Sexual activity: Defer       Family History   Problem Relation Age of Onset   • Parkinsonism Mother    • Heart disease Father    • Diabetes Father    • Cancer Brother        Allergies   Allergen Reactions   • Sulfa Antibiotics        Current Outpatient Medications   Medication Sig Dispense Refill   • " amLODIPine (NORVASC) 5 MG tablet Take 5 mg by mouth Daily.     • aspirin 325 MG tablet Take 325 mg by mouth daily.     • atenolol (TENORMIN) 50 MG tablet      • carbidopa-levodopa (SINEMET)  MG per tablet Take 1 tablet by mouth 3 (Three) Times a Day.     • gabapentin (NEURONTIN) 600 MG tablet Take 600 mg by mouth 3 (three) times a day.     • metFORMIN XR (GLUCOPHAGE-XR) 500 MG 24 hr tablet Take 500 mg by mouth daily with breakfast.     • oxyCODONE-acetaminophen (PERCOCET) 7.5-325 MG per tablet Take 1 tablet by mouth Every 6 (Six) Hours As Needed for Moderate Pain . 120 tablet 0     No current facility-administered medications for this visit.      REVIEW OF SYSTEMS  CONSTITUTIONAL:  No fever, chills or night sweats. Chronic fatigue, stable.  EYES:  No blurry vision, diplopia or other vision changes.  ENT:  No hearing loss, nosebleeds or sore throat.  CARDIOVASCULAR:  No palpitations, arrhythmia, syncopal episodes or edema.  PULMONARY:  No hemoptysis, wheezing, chronic cough or shortness of breath.  GASTROINTESTINAL:  No nausea or vomiting.  No constipation or diarrhea. No abdominal pain.  GENITOURINARY:  No hematuria or kidney stones. Recent burning with urination, for which she is starting an antibiotic today, as per the HPI above.  MUSCULOSKELETAL:  As per the HPI above.  INTEGUMENTARY: No rashes or pruritus.  ENDOCRINE:  No excessive thirst or hot flashes.  HEMATOLOGIC:  No history of free bleeding, spontaneous bleeding or clotting.  IMMUNOLOGIC:  No allergies or frequent infections.  NEUROLOGIC: No numbness, tingling, seizures or weakness.  PSYCHIATRIC:  Mild chronic anxiety, stable.    PHYSICAL EXAMINATION  /83   Pulse 61   Temp 98 °F (36.7 °C) (Temporal)   Resp 18   Wt 77.7 kg (171 lb 6.4 oz)   SpO2 98%   BMI 28.52 kg/m²     GENERAL:  A well-developed, well-nourished, elderly,  female in no acute distress.  HEENT:  Pupils equally round and reactive to light. Extraocular muscles  intact.  CARDIOVASCULAR:  Regular rate and rhythm. No murmurs, gallops or rubs.  LUNGS:  Clear to auscultation bilaterally.  ABDOMEN:  Soft, nontender, nondistended with positive bowel sounds.  EXTREMITIES:  No clubbing, cyanosis or edema bilaterally.  SKIN:  No rashes or petechiae.  NEURO:  Cranial nerves grossly intact. No focal deficits. Mild, resting tremors.  PSYCH:  Alert and oriented x3.    LABORATORY    Lab Results   Component Value Date    WBC 7.25 12/11/2020    HGB 13.2 12/11/2020    HCT 41.0 12/11/2020    MCV 92.8 12/11/2020     12/11/2020    NEUTROABS 3.28 12/11/2020       Lab Results   Component Value Date     12/11/2020    K 4.3 12/11/2020     12/11/2020    CO2 19.5 (L) 12/11/2020    BUN 21 12/11/2020    CREATININE 1.11 (H) 12/11/2020    GLUCOSE 156 (H) 12/11/2020    CALCIUM 9.4 12/11/2020    AST 15 12/11/2020    ALT 5 12/11/2020    ALKPHOS 96 12/11/2020    BILITOT 0.3 12/11/2020    PROTEINTOT 7.6 12/11/2020    ALBUMIN 4.23 12/11/2020     CBC (12/11/2020): WBCs: 7.2; HgB: 13.2; Hct: 41.0; platelets: 290; MCV: 92.8  CBC (05/21/2020): WBCs: 6.9; HgB: 14.2; Hct: 44.3; platelets: 282; MCV: 93.5  CBC (10/22/2019): WBCs: 6.1; HgB: 12.8; Hct: 39.1; platelets: 318; MCV: 92.9  CBC (06/14/2019): WBCs: 4.7; HgB: 12.7; Hct: 38.7; platelets: 252; MCV: 95.8  CBC (03/05/2019): WBCs: 5.9; HgB: 13.6; Hct: 41.4; platelets: 264; MCV: 99.0  CBC (11/20/2018): WBCs: 5.3; HgB: 13.0; Hct: 40.4; platelets: 243; MCV: 96.9  CBC (08/23/2018): WBCs: 5.7; HgB: 13.8; Hct: 43.4; platelets: 248    IMAGING  CT chest, abdomen and pelvis with contrast (06/30/2018, at ):  Impression:  1) Small, tiny peripheral densities in the lungs most noticeable on the right.  2) Heterogeneous lesion inferior in the right lobe of the liver.  3) Heterogeneous enhancing nodular foci in the retroperitoneum.  4) Vascular calcifications with moderate coronary artery disease.  5) Moderate degenerative changes in the spine with moderate  spinal canal stenosis.    CT chest without contrast (08/16/2018):  Impression: Negative CT scan of the thorax. Nothing is seen to suggest metastatic disease in the chest.    CT abdomen and pelvis without contrast (08/16/2018):  Impression: Patient status post right nephrectomy. There is a low density mass in the retroperitoneum that would be consistent with metastatic disease to the retroperitoneal lymph nodes possibly involving the inferior vena cava. It measures approximately 3.8 cm in diameter.    NM bone scan, whole body (09/27/2018):  Impression: There are no findings to suggest metastatic disease or to explain the patient's symptoms.    CT chest without contrast (11/20/2018):  Impression: Stable CT scan of the thorax. Nothing seen to suggest metastatic disease. There is fairly heavy coronary artery calcification. There were no pulmonary parenchymal lung nodules or pleural effusions. No enlarged lymph nodes were demonstrated.    CT abdomen and pelvis without contrast (11/20/2018):  Impression:  Patient status post right nephrectomy. There continues to be some abnormal soft tissue density in the retroperitoneum in the pericaval area concerning for retroperitoneal lymphadenopathy. This is completely unchanged, however, from 08/2018 exam.    CT abdomen and pelvis without contrast (03/05/2019):  Impression: Stable appearance of the abdomen including soft tissue density mass in the retroperitoneal region. There continues to be some abnormal nearly 7 cm soft tissue density in the retroperitoneum in the pericaval area that remains stable.    CT chest, abdomen and pelvis with contrast (10/30/2019):  Impression: A noncalcified nodule right lower lobe on image 54 series 3 measures 7 x 6 mm and appears slightly larger, previously measuring 6 x 4 mm. No new nodules. Interval resection of retroperitoneal maxwell masses. Stable, lobulated mass in the lower right hepatic lobe.    CT chest without contrast  (05/20/2020):  Impression:  1) No parenchymal soft tissue nodules or masses.  2) No pericardial or pleural effusions.  3) No adenopathy.    CT chest, abdomen and pelvis with contrast (08/05/2020, compared to 10/30/2019, at ):  Impression: No evidence of disease progression with unchanged 8 mm noncalcified right lower lobe pulmonary nodule. Unchanged, lobulated mass in the right hemiliver. No evidence of disease progression.    NM bone scan, whole body (12/30/2020):  Impression: No evidence of metastatic disease to the skeleton.    PATHOLOGY  Right kidney, radical nephrectomy (11/05/2008, per pathology report; outside slides):  Renal cell carcinoma, clear cell type. Unremarkable adrenal gland.    Right retroperitoneal mass (07/12/2018):  Positive for malignancy; consistent with recurrent renal cell carcinoma. PAX8 is strongly positive in tumor cells, and Inhibin is negative.    Retroperitoneal mass, resection (07/22/2019):  Metastatic renal cell carcinoma.    IMPRESSION AND PLAN  Ms. Berman is a 71 y.o.,  female with:  1. Renal cell carcinoma: Initially diagnosed in 2008 and status post a left-sided nephrectomy, with no signs/symptoms of recurrent disease for the next ten years. Unfortunately, as of June 2018, this was/is no longer the case. CT scans performed at  on 06/30/2018 identified multiple foci in the retroperitoneum (along with possible lung lesions; although lung imaging since then has been less concerning), consistent with (and CT-guided FNA confirmed) recurrent, and now metastatic, disease. I have had multiple, long discussions with the patient and her daughters since the time of her initial presentation in our clinic (on 07/25/2018) regarding this diagnosis and, in general terms, its prognosis. In short, they remain aware that this disease is likely no longer curable; but that, particularly given her good performance status, it was (and remains) treatable. While there are have  always been multiple, systemic options available to us (including PO TKIs, such as pazopanib, and IV immunotherapy, such as qmonthly nivolumab), it has continued to be reasonable to defer starting any specific, systemic treatment to date. Renal cell carcinoma is typically slow growing, and this appears to especially be the case here. She did not relapse for ten years, the CT scans performed in late June 2018 at  showed a low, tumor burden (and this continued to be the case from that point); and she was (and remains) minimally (other than issue #2) symptomatic. In general, the longer an elderly patient with multiple comobidities can avoid the potential side effects of palliative treatment, the better. Given her continued, very stable, oligometastatic disease, by early 2019 it seemed worthwhile to send her back to urology for reevaluation to consider resecting the retroperitoneal, soft tissue mass. This referral was made in Spring 2019, and following an evaluation at  (which included an MRI to make sure the mass did not appear to involve the great vessels),  urology ultimately took her to the OR on 07/22/2019. This procedure went extremely well. Today, ~one and one half (1.5) years later, she has recovered fully and continues to have no complaints other than her chronic back pains. She most recently followed up with  urology on 08/05/2020, and repeat CT scans at that time (summarized above) again showed no definite evidence of residual/recurrent disease. She will follow up with  urology again in ~late August 2021, as previously planned, with repeat CTs of the chest, abdomen and pelvis; however, we will repeat imaging locally in six (6) weeks, and we will see her back in clinic at that time.  2. Back pain: A chronic issue which the retroperitoneal foci from issue #1 was likely exacerbating; but which, now that the mass has been excised, is likely primarily due to longstanding arthritis. As her neck pain has  recently been a little worse, we further evaluated with a repeat NM bone scan; and this study, performed on 12/30/2020 (and summarized above) remained stable and completely unremarkable. With her diagnosis with issue #1, our clinic has agreed to provide her with her pain medicine. Continue prn Percocet 7.5/325 mg q6hr prn. A refill was provided today. Continue to monitor.  The patient and her daughter were in agreement with these plans.    It is a pleasure to participate in Ms. Berman's care. Please do not hesitate to call with any questions or concerns that you may have.    A total of 30 minutes were spent coordinating this patient’s care in clinic today; more than 50% of this time was face-to-face with the patient and her daughter, reviewing her interim medical history, discussing the results of last month's repeat NM bone scan and counseling on the current followup plan. All questions were answered to their satisfaction.    FOLLOW UP  Refill of Percocet 7.5 mg PO q6hr prn disp #120 provided today. With  urology in ~August 2021 with repeat imaging, as previously planned. Return to our clinic in 6 weeks with a CBC, CMP and repeat CTs of the chest, abdomen and pelvis without contrast.            This document was electronically signed by GABY Graham MD January 22, 2021 15:16 EST      CC: ANGELA Clark MD

## 2021-02-24 DIAGNOSIS — C64.9 CARCINOMA OF KIDNEY, UNSPECIFIED LATERALITY (HCC): Primary | ICD-10-CM

## 2021-02-26 ENCOUNTER — HOSPITAL ENCOUNTER (OUTPATIENT)
Dept: CT IMAGING | Facility: HOSPITAL | Age: 71
Discharge: HOME | End: 2021-02-26

## 2021-02-26 ENCOUNTER — APPOINTMENT (OUTPATIENT)
Dept: CT IMAGING | Facility: HOSPITAL | Age: 71
End: 2021-02-26

## 2021-02-26 DIAGNOSIS — C64.9 CARCINOMA OF KIDNEY, UNSPECIFIED LATERALITY (HCC): ICD-10-CM

## 2021-02-26 LAB — CREAT BLDA-MCNC: 1.1 MG/DL (ref 0.6–1.3)

## 2021-02-26 PROCEDURE — 71260 CT THORAX DX C+: CPT

## 2021-02-26 PROCEDURE — 74177 CT ABD & PELVIS W/CONTRAST: CPT | Performed by: RADIOLOGY

## 2021-02-26 PROCEDURE — 74177 CT ABD & PELVIS W/CONTRAST: CPT

## 2021-02-26 PROCEDURE — 25010000002 IOPAMIDOL 61 % SOLUTION: Performed by: INTERNAL MEDICINE

## 2021-02-26 PROCEDURE — 82565 ASSAY OF CREATININE: CPT

## 2021-02-26 PROCEDURE — 71260 CT THORAX DX C+: CPT | Performed by: RADIOLOGY

## 2021-02-26 RX ADMIN — IOPAMIDOL 60 ML: 612 INJECTION, SOLUTION INTRAVENOUS at 13:22

## 2021-03-05 ENCOUNTER — OFFICE VISIT (OUTPATIENT)
Dept: ONCOLOGY | Facility: CLINIC | Age: 71
End: 2021-03-05

## 2021-03-05 VITALS
HEART RATE: 80 BPM | RESPIRATION RATE: 18 BRPM | DIASTOLIC BLOOD PRESSURE: 105 MMHG | OXYGEN SATURATION: 91 % | TEMPERATURE: 97.1 F | SYSTOLIC BLOOD PRESSURE: 160 MMHG

## 2021-03-05 DIAGNOSIS — C64.9 CARCINOMA OF KIDNEY, UNSPECIFIED LATERALITY (HCC): ICD-10-CM

## 2021-03-05 LAB
ALBUMIN SERPL-MCNC: 3.95 G/DL (ref 3.5–5.2)
ALBUMIN/GLOB SERPL: 1.1 G/DL
ALP SERPL-CCNC: 90 U/L (ref 39–117)
ALT SERPL W P-5'-P-CCNC: 8 U/L (ref 1–33)
ANION GAP SERPL CALCULATED.3IONS-SCNC: 10 MMOL/L (ref 5–15)
AST SERPL-CCNC: 16 U/L (ref 1–32)
BASOPHILS # BLD AUTO: 0.07 10*3/MM3 (ref 0–0.2)
BASOPHILS NFR BLD AUTO: 1.6 % (ref 0–1.5)
BILIRUB SERPL-MCNC: 0.4 MG/DL (ref 0–1.2)
BUN SERPL-MCNC: 14 MG/DL (ref 8–23)
BUN/CREAT SERPL: 11.1 (ref 7–25)
CALCIUM SPEC-SCNC: 9.2 MG/DL (ref 8.6–10.5)
CHLORIDE SERPL-SCNC: 105 MMOL/L (ref 98–107)
CO2 SERPL-SCNC: 21 MMOL/L (ref 22–29)
CREAT SERPL-MCNC: 1.26 MG/DL (ref 0.57–1)
DEPRECATED RDW RBC AUTO: 43.6 FL (ref 37–54)
EOSINOPHIL # BLD AUTO: 0.25 10*3/MM3 (ref 0–0.4)
EOSINOPHIL NFR BLD AUTO: 5.6 % (ref 0.3–6.2)
ERYTHROCYTE [DISTWIDTH] IN BLOOD BY AUTOMATED COUNT: 12.4 % (ref 12.3–15.4)
GFR SERPL CREATININE-BSD FRML MDRD: 51 ML/MIN/1.73
GLOBULIN UR ELPH-MCNC: 3.7 GM/DL
GLUCOSE SERPL-MCNC: 170 MG/DL (ref 65–99)
HCT VFR BLD AUTO: 43.8 % (ref 34–46.6)
HGB BLD-MCNC: 13.8 G/DL (ref 12–15.9)
IMM GRANULOCYTES # BLD AUTO: 0.01 10*3/MM3 (ref 0–0.05)
IMM GRANULOCYTES NFR BLD AUTO: 0.2 % (ref 0–0.5)
LYMPHOCYTES # BLD AUTO: 1.65 10*3/MM3 (ref 0.7–3.1)
LYMPHOCYTES NFR BLD AUTO: 37.2 % (ref 19.6–45.3)
MCH RBC QN AUTO: 29.9 PG (ref 26.6–33)
MCHC RBC AUTO-ENTMCNC: 31.5 G/DL (ref 31.5–35.7)
MCV RBC AUTO: 94.8 FL (ref 79–97)
MONOCYTES # BLD AUTO: 0.65 10*3/MM3 (ref 0.1–0.9)
MONOCYTES NFR BLD AUTO: 14.7 % (ref 5–12)
NEUTROPHILS NFR BLD AUTO: 1.8 10*3/MM3 (ref 1.7–7)
NEUTROPHILS NFR BLD AUTO: 40.7 % (ref 42.7–76)
NRBC BLD AUTO-RTO: 0 /100 WBC (ref 0–0.2)
PLATELET # BLD AUTO: 263 10*3/MM3 (ref 140–450)
PMV BLD AUTO: 10.4 FL (ref 6–12)
POTASSIUM SERPL-SCNC: 4.2 MMOL/L (ref 3.5–5.2)
PROT SERPL-MCNC: 7.6 G/DL (ref 6–8.5)
RBC # BLD AUTO: 4.62 10*6/MM3 (ref 3.77–5.28)
SODIUM SERPL-SCNC: 136 MMOL/L (ref 136–145)
WBC # BLD AUTO: 4.43 10*3/MM3 (ref 3.4–10.8)

## 2021-03-05 PROCEDURE — 99214 OFFICE O/P EST MOD 30 MIN: CPT | Performed by: INTERNAL MEDICINE

## 2021-03-05 PROCEDURE — 80053 COMPREHEN METABOLIC PANEL: CPT | Performed by: INTERNAL MEDICINE

## 2021-03-05 PROCEDURE — 85025 COMPLETE CBC W/AUTO DIFF WBC: CPT | Performed by: INTERNAL MEDICINE

## 2021-03-05 NOTE — PROGRESS NOTES
"  Name:  Nancy Berman  :  1950  Date:  3/5/2021     REFERRING PROVIDER  ANGELA Clark    PRIMARY CARE PROVIDER  Julee Arechiga APRN    REASON FOR FOLLOWUP  1. Carcinoma of kidney, unspecified laterality (CMS/HCC)      CHIEF COMPLAINT  Chronic low back pain, legs pains and recently slightly worsening neck pain, still overall manageable with prn Percocet.    Dear Ms. Michelsnut,    HISTORY OF PRESENT ILLNESS:   I saw Ms. Berman in follow up today in our medical oncology clinic. As you are aware, she is a pleasant, 71 y.o.,  female with a history of hypertension, diabetes and chronic kidney disease who was initially diagnosed with, and underwent an uncomplicated resection of, a left-sided kidney cancer in . Adjuvant therapy was not indicated. Ultimately, she had been doing very well from the standpoint of this disease ever since then (for the past ten years). However, in early summer 2018, as part of a routine follow up evaluation for her chronic kidney disease (of her remaining, right kidney), you ordered a renal ultrasound. Unexpectedly, this study identified a retroperitoneal mass; and subsequent CT scans (performed in late 2018) showed \"nodular foci in the retroperitoneum\", a \"heterogeneous lesion inferior in the right lobe of the liver\" and \"small, tiny peripheral densities in the lungs most noticeable on the right\", consistent with more widespread and metastatic disease. She underwent a CT-guided biopsy of a retroperitoneal mass at  on 2018, and the pathology results were consistent with recurrent, renal cell carcinoma. She was subsequently referred to our clinic for further evaluation and management.    INTERIM HISTORY:  Ms. Berman returns to clinic today for follow up again accompanied by her daughter. She has a history of longstanding back pain (which, for years, has had a tendency to worsen when she stands up to wash dishes and forces her to " "sit down periodically to rest) and fatigue. Her low back pain remains controlled on her current schedule of prn Percocet (when she is taking it; she occasionally runs out of her supply and \"forgets\" to get it refilled in a timely fashion). She underwent resection of her recurrent, retroperitoneal mass by  urology on 07/22/2019. This procedure went very well, and she has continued to do very well from the standpoint of her malignancy ever since then. Her only recently new complaint has been that her neck has been bothering her a little more (compared to early last year). She otherwise continues to feel the same as she usually does, and she again has no new or other specific complaints.    Past Medical History:   Diagnosis Date   • Arthritis    • Cancer (CMS/HCC)     left Kidney   • Chronic kidney disease, stage III (moderate) (CMS/HCC)    • Diabetes mellitus (CMS/HCC)    • High cholesterol    • Hypertension    • Low back pain    • Spinal stenosis        Past Surgical History:   Procedure Laterality Date   • BLADDER SURGERY     • HYSTERECTOMY     • KIDNEY SURGERY Left     Nephrectomy for cancerous mass   • IL OPEN BHAVANA FIXATN HUMERAL SHAFT FX Left 4/20/2017    Procedure: LEFT HUMERUS INTRAMEDULLARY NAIL/BHAVANA INSERTION;  Surgeon: Jose Willard MD;  Location: [facility];  Service: Orthopedics       Social History     Socioeconomic History   • Marital status: Legally      Spouse name: Not on file   • Number of children: Not on file   • Years of education: Not on file   • Highest education level: Not on file   Tobacco Use   • Smoking status: Never Smoker   • Smokeless tobacco: Never Used   Substance and Sexual Activity   • Alcohol use: Yes     Comment: social   • Drug use: Defer   • Sexual activity: Defer       Family History   Problem Relation Age of Onset   • Parkinsonism Mother    • Heart disease Father    • Diabetes Father    • Cancer Brother        Allergies   Allergen Reactions   • Sulfa Antibiotics "        Current Outpatient Medications   Medication Sig Dispense Refill   • amLODIPine (NORVASC) 5 MG tablet Take 5 mg by mouth Daily.     • aspirin 325 MG tablet Take 325 mg by mouth daily.     • atenolol (TENORMIN) 50 MG tablet      • carbidopa-levodopa (SINEMET)  MG per tablet Take 1 tablet by mouth 3 (Three) Times a Day.     • gabapentin (NEURONTIN) 600 MG tablet Take 600 mg by mouth 3 (three) times a day.     • metFORMIN XR (GLUCOPHAGE-XR) 500 MG 24 hr tablet Take 500 mg by mouth daily with breakfast.     • oxyCODONE-acetaminophen (PERCOCET) 7.5-325 MG per tablet Take 1 tablet by mouth Every 6 (Six) Hours As Needed for Moderate Pain . 120 tablet 0     No current facility-administered medications for this visit.      REVIEW OF SYSTEMS  CONSTITUTIONAL:  No fever, chills or night sweats. Chronic fatigue, stable.  EYES:  No blurry vision, diplopia or other vision changes.  ENT:  No hearing loss, nosebleeds or sore throat.  CARDIOVASCULAR:  No palpitations, arrhythmia, syncopal episodes or edema.  PULMONARY:  No hemoptysis, wheezing, chronic cough or shortness of breath.  GASTROINTESTINAL:  No nausea or vomiting.  No constipation or diarrhea. No abdominal pain.  GENITOURINARY:  No hematuria or kidney stones. Recent burning with urination, for which she is starting an antibiotic today, as per the HPI above.  MUSCULOSKELETAL:  As per the HPI above.  INTEGUMENTARY: No rashes or pruritus.  ENDOCRINE:  No excessive thirst or hot flashes.  HEMATOLOGIC:  No history of free bleeding, spontaneous bleeding or clotting.  IMMUNOLOGIC:  No allergies or frequent infections.  NEUROLOGIC: No numbness, tingling, seizures or weakness.  PSYCHIATRIC:  Mild chronic anxiety, stable.    PHYSICAL EXAMINATION  BP (!) 160/105   Pulse 80   Temp 97.1 °F (36.2 °C) (Temporal)   Resp 18   SpO2 91%     GENERAL:  A well-developed, well-nourished, elderly,  female in no acute distress, sitting in a wheelchair.  HEENT:   Pupils equally round and reactive to light. Extraocular muscles intact.  CARDIOVASCULAR:  Regular rate and rhythm. No murmurs, gallops or rubs.  LUNGS:  Clear to auscultation bilaterally.  ABDOMEN:  Soft, nontender, nondistended with positive bowel sounds.  EXTREMITIES:  No clubbing, cyanosis or edema bilaterally.  SKIN:  No rashes or petechiae.  NEURO:  Cranial nerves grossly intact. No focal deficits. Mild, resting tremors, stable.  PSYCH:  Alert and oriented x3.    LABORATORY    Lab Results   Component Value Date    WBC 4.43 03/05/2021    HGB 13.8 03/05/2021    HCT 43.8 03/05/2021    MCV 94.8 03/05/2021     03/05/2021    NEUTROABS 1.80 03/05/2021       Lab Results   Component Value Date     12/11/2020    K 4.3 12/11/2020     12/11/2020    CO2 19.5 (L) 12/11/2020    BUN 21 12/11/2020    CREATININE 1.10 02/26/2021    GLUCOSE 156 (H) 12/11/2020    CALCIUM 9.4 12/11/2020    AST 15 12/11/2020    ALT 5 12/11/2020    ALKPHOS 96 12/11/2020    BILITOT 0.3 12/11/2020    PROTEINTOT 7.6 12/11/2020    ALBUMIN 4.23 12/11/2020     CBC (03/05/2021): WBCs: 4.4; HgB: 13.8; Hct: 43.8; platelets: 263  CBC (12/11/2020): WBCs: 7.2; HgB: 13.2; Hct: 41.0; platelets: 290  CBC (05/21/2020): WBCs: 6.9; HgB: 14.2; Hct: 44.3; platelets: 282  CBC (10/22/2019): WBCs: 6.1; HgB: 12.8; Hct: 39.1; platelets: 318  CBC (06/14/2019): WBCs: 4.7; HgB: 12.7; Hct: 38.7; platelets: 252  CBC (03/05/2019): WBCs: 5.9; HgB: 13.6; Hct: 41.4; platelets: 264  CBC (11/20/2018): WBCs: 5.3; HgB: 13.0; Hct: 40.4; platelets: 243  CBC (08/23/2018): WBCs: 5.7; HgB: 13.8; Hct: 43.4; platelets: 248    IMAGING  CT chest, abdomen and pelvis with contrast (06/30/2018, at ):  Impression:  1) Small, tiny peripheral densities in the lungs most noticeable on the right.  2) Heterogeneous lesion inferior in the right lobe of the liver.  3) Heterogeneous enhancing nodular foci in the retroperitoneum.  4) Vascular calcifications with moderate coronary artery  disease.  5) Moderate degenerative changes in the spine with moderate spinal canal stenosis.    CT chest without contrast (08/16/2018):  Impression: Negative CT scan of the thorax. Nothing is seen to suggest metastatic disease in the chest.    CT abdomen and pelvis without contrast (08/16/2018):  Impression: Patient status post right nephrectomy. There is a low density mass in the retroperitoneum that would be consistent with metastatic disease to the retroperitoneal lymph nodes possibly involving the inferior vena cava. It measures approximately 3.8 cm in diameter.    NM bone scan, whole body (09/27/2018):  Impression: There are no findings to suggest metastatic disease or to explain the patient's symptoms.    CT chest without contrast (11/20/2018):  Impression: Stable CT scan of the thorax. Nothing seen to suggest metastatic disease. There is fairly heavy coronary artery calcification. There were no pulmonary parenchymal lung nodules or pleural effusions. No enlarged lymph nodes were demonstrated.    CT abdomen and pelvis without contrast (11/20/2018):  Impression:  Patient status post right nephrectomy. There continues to be some abnormal soft tissue density in the retroperitoneum in the pericaval area concerning for retroperitoneal lymphadenopathy. This is completely unchanged, however, from 08/2018 exam.    CT abdomen and pelvis without contrast (03/05/2019):  Impression: Stable appearance of the abdomen including soft tissue density mass in the retroperitoneal region. There continues to be some abnormal nearly 7 cm soft tissue density in the retroperitoneum in the pericaval area that remains stable.    CT chest, abdomen and pelvis with contrast (10/30/2019):  Impression: A noncalcified nodule right lower lobe on image 54 series 3 measures 7 x 6 mm and appears slightly larger, previously measuring 6 x 4 mm. No new nodules. Interval resection of retroperitoneal maxwell masses. Stable, lobulated mass in the lower  right hepatic lobe.    CT chest without contrast (05/20/2020):  Impression:  1) No parenchymal soft tissue nodules or masses.  2) No pericardial or pleural effusions.  3) No adenopathy.    CT chest, abdomen and pelvis with contrast (08/05/2020, compared to 10/30/2019, at ):  Impression: No evidence of disease progression with unchanged 8 mm noncalcified right lower lobe pulmonary nodule. Unchanged, lobulated mass in the right hemiliver. No evidence of disease progression.    NM bone scan, whole body (12/30/2020):  Impression: No evidence of metastatic disease to the skeleton.    CT chest with contrast (02/26/2021):  Impression: At this time there are no CT findings to suggest metastatic disease in the chest from the patient's renal cell carcinoma.    CT abdomen and pelvis with contrast (02/26/2021, compared to 02/22/2019):  Impression: The patient has developed an area of abnormal density in the right lobe of the liver not seen on the previous CTs. It measures 28 x 12 mm. [Upon further review with radiology, while this area was not seen on the previous, noncontrasted CTs of the abdomen, it is visible (and stable) on an available MRI from back in 2009.] The soft tissue abnormality in the retroperitoneum near the level of the renal vein is improved in comparing with earlier CTs. The right kidney is absent consistent with previous nephrectomy.    PATHOLOGY  Right kidney, radical nephrectomy (11/05/2008, per pathology report; outside slides):  Renal cell carcinoma, clear cell type. Unremarkable adrenal gland.    Right retroperitoneal mass (07/12/2018):  Positive for malignancy; consistent with recurrent renal cell carcinoma. PAX8 is strongly positive in tumor cells, and Inhibin is negative.    Retroperitoneal mass, resection (07/22/2019):  Metastatic renal cell carcinoma.    IMPRESSION AND PLAN  Ms. Berman is a 71 y.o.,  female with:  1. Renal cell carcinoma: Initially diagnosed in 2008 and status  post a left-sided nephrectomy, with no signs/symptoms of recurrent disease for the next ten years. Unfortunately, as of June 2018, this was/is no longer the case. CT scans performed at  on 06/30/2018 identified multiple foci in the retroperitoneum (along with possible lung lesions; although lung imaging since then has been less concerning), consistent with (and CT-guided FNA confirmed) recurrent, and now metastatic, disease. I have had multiple, long discussions with the patient and her daughters since the time of her initial presentation in our clinic (on 07/25/2018) regarding this diagnosis and, in general terms, its prognosis. In short, they remain aware that this disease is likely no longer curable; but that, particularly given her good performance status, it was (and remains) treatable. While there are have always been multiple, systemic options available to us (including PO TKIs, such as pazopanib, and IV immunotherapy, such as qmonthly nivolumab), it has continued to be reasonable to defer starting any specific, systemic treatment to date. Renal cell carcinoma is typically slow growing, and this appears to especially be the case here. She did not relapse for ten years, the CT scans performed in late June 2018 at  showed a low, tumor burden (and this continued to be the case from that point); and she was (and remains) minimally (other than issue #2) symptomatic. In general, the longer an elderly patient with multiple comobidities can avoid the potential side effects of palliative treatment, the better. Given her continued, very stable, oligometastatic disease, by early 2019 it seemed worthwhile to send her back to urology for reevaluation to consider resecting the retroperitoneal, soft tissue mass. This referral was made in Spring 2019, and following an evaluation at  (which included an MRI to make sure the mass did not appear to involve the great vessels),  urology ultimately took her to the OR on  07/22/2019. This procedure went extremely well. Today, over one and one half (1.5) years later, she has recovered fully and continues to have no complaints other than her chronic back pains. She most recently followed up with  urology on 08/05/2020, and repeat CT scans at that time (summarized above) again showed no definite evidence of residual/recurrent disease. This also continues to be the case on the most recent repeat imaging performed through our clinic (CTs on 02/26/2021, all summarized above) She will follow up with [facility] again in ~late August 2021, as previously planned; and we will see her back in our clinic in six months, shortly after these (the ) appointments.  2. Back pain: A chronic issue which the retroperitoneal foci from issue #1 was likely exacerbating; but which, now that the mass has been excised, is likely primarily due to longstanding arthritis. As her neck pain has recently been a little worse, we further evaluated with a repeat NM bone scan; and this study, performed on 12/30/2020 (and summarized above) remained stable and completely unremarkable. With her diagnosis with issue #1, our clinic has agreed to provide her with her pain medicine. Continue prn Percocet 7.5/325 mg q6hr prn. Continue to monitor.  The patient and her daughter were in agreement with these plans.    It is a pleasure to participate in Ms. Berman's care. Please do not hesitate to call with any questions or concerns that you may have.    A total of 30 minutes were spent coordinating this patient’s care in clinic today; more than 50% of this time was face-to-face with the patient and her daughter, reviewing her interim medical history, discussing the results of the recent repeat CT scans and counseling on the current followup plan. All questions were answered to their satisfaction.    FOLLOW UP  Continue Percocet 7.5 mg PO q6hr prn. With  urology in ~August 2021 with repeat imaging, as previously planned.  Return to our clinic in 6 months, just after these (the ) appointments, with a CBC and CMP.            This document was electronically signed by GABY Graham MD March 5, 2021 14:16 EST      CC: ANGELA Clark MD

## 2021-03-06 ENCOUNTER — DOCUMENTATION (OUTPATIENT)
Dept: ONCOLOGY | Facility: CLINIC | Age: 71
End: 2021-03-06

## 2021-03-06 DIAGNOSIS — C64.9 CARCINOMA OF KIDNEY, UNSPECIFIED LATERALITY (HCC): ICD-10-CM

## 2021-03-06 DIAGNOSIS — R52 PAIN: ICD-10-CM

## 2021-03-06 DIAGNOSIS — C64.9 RENAL CELL CARCINOMA, UNSPECIFIED LATERALITY (HCC): ICD-10-CM

## 2021-03-06 RX ORDER — OXYCODONE AND ACETAMINOPHEN 7.5; 325 MG/1; MG/1
1 TABLET ORAL EVERY 6 HOURS PRN
Qty: 120 TABLET | Refills: 0 | Status: SHIPPED | OUTPATIENT
Start: 2021-03-06 | End: 2021-03-06 | Stop reason: SDUPTHER

## 2021-03-06 RX ORDER — OXYCODONE AND ACETAMINOPHEN 7.5; 325 MG/1; MG/1
1 TABLET ORAL EVERY 6 HOURS PRN
Qty: 120 TABLET | Refills: 0 | Status: SHIPPED | OUTPATIENT
Start: 2021-03-06 | End: 2021-03-08 | Stop reason: SDUPTHER

## 2021-03-08 ENCOUNTER — TELEPHONE (OUTPATIENT)
Dept: ONCOLOGY | Facility: CLINIC | Age: 71
End: 2021-03-08

## 2021-03-08 DIAGNOSIS — C64.9 RENAL CELL CARCINOMA, UNSPECIFIED LATERALITY (HCC): ICD-10-CM

## 2021-03-08 DIAGNOSIS — C64.9 CARCINOMA OF KIDNEY, UNSPECIFIED LATERALITY (HCC): Primary | ICD-10-CM

## 2021-03-08 DIAGNOSIS — R52 PAIN: ICD-10-CM

## 2021-03-08 DIAGNOSIS — C64.9 CARCINOMA OF KIDNEY, UNSPECIFIED LATERALITY (HCC): ICD-10-CM

## 2021-03-08 RX ORDER — OXYCODONE AND ACETAMINOPHEN 7.5; 325 MG/1; MG/1
1 TABLET ORAL EVERY 6 HOURS PRN
Qty: 120 TABLET | Refills: 0 | Status: SHIPPED | OUTPATIENT
Start: 2021-03-08 | End: 2021-04-05 | Stop reason: SDUPTHER

## 2021-03-08 RX ORDER — OXYCODONE AND ACETAMINOPHEN 7.5; 325 MG/1; MG/1
1 TABLET ORAL EVERY 6 HOURS PRN
Qty: 120 TABLET | Refills: 0 | Status: SHIPPED | OUTPATIENT
Start: 2021-03-08 | End: 2021-03-08 | Stop reason: SDUPTHER

## 2021-03-08 NOTE — TELEPHONE ENCOUNTER
Caller: MERISSA FROM [facility]    Relationship to patient: PHARMACIST    Best call back number: [phone]    Patient is needing: CHECKING ON PERSCRIPTION FOR PAIN MEDICATION.

## 2021-04-05 DIAGNOSIS — C64.9 CARCINOMA OF KIDNEY, UNSPECIFIED LATERALITY (HCC): ICD-10-CM

## 2021-04-05 DIAGNOSIS — C64.9 RENAL CELL CARCINOMA, UNSPECIFIED LATERALITY (HCC): ICD-10-CM

## 2021-04-05 DIAGNOSIS — R52 PAIN: ICD-10-CM

## 2021-04-05 RX ORDER — OXYCODONE AND ACETAMINOPHEN 7.5; 325 MG/1; MG/1
1 TABLET ORAL EVERY 6 HOURS PRN
Qty: 120 TABLET | Refills: 0 | Status: SHIPPED | OUTPATIENT
Start: 2021-04-05 | End: 2021-05-14 | Stop reason: SDUPTHER

## 2021-04-05 NOTE — TELEPHONE ENCOUNTER
----- Message from Trisha Morocho MA sent at 4/5/2021  9:16 AM EDT -----  Patient called she is needing a refill on her pain medication sent to [place] pharmacy. Thanks

## 2021-05-14 DIAGNOSIS — C64.9 RENAL CELL CARCINOMA, UNSPECIFIED LATERALITY (HCC): ICD-10-CM

## 2021-05-14 DIAGNOSIS — R52 PAIN: ICD-10-CM

## 2021-05-14 DIAGNOSIS — C64.9 CARCINOMA OF KIDNEY, UNSPECIFIED LATERALITY (HCC): ICD-10-CM

## 2021-05-14 RX ORDER — OXYCODONE AND ACETAMINOPHEN 7.5; 325 MG/1; MG/1
1 TABLET ORAL EVERY 6 HOURS PRN
Qty: 120 TABLET | Refills: 0 | Status: SHIPPED | OUTPATIENT
Start: 2021-05-14 | End: 2021-06-17 | Stop reason: SDUPTHER

## 2021-05-14 NOTE — TELEPHONE ENCOUNTER
Caller: CARLEE    Relationship:     Best call back number: [phone]    Medication needed:   Requested Prescriptions     Pending Prescriptions Disp Refills   • oxyCODONE-acetaminophen (PERCOCET) 7.5-325 MG per tablet 120 tablet 0     Sig: Take 1 tablet by mouth Every 6 (Six) Hours As Needed for Moderate Pain .       When do you need the refill by: ASAP    What additional details did the patient provide when requesting the medication: COMPLETELY OUT    Does the patient have less than a 3 day supply:  [x] Yes  [] No    What is the patient's preferred pharmacy: [facility] [employer] [place], [place] - [address] - [phone] [facility] [phone] FX

## 2021-06-17 DIAGNOSIS — C64.9 CARCINOMA OF KIDNEY, UNSPECIFIED LATERALITY (HCC): ICD-10-CM

## 2021-06-17 DIAGNOSIS — C64.9 RENAL CELL CARCINOMA, UNSPECIFIED LATERALITY (HCC): ICD-10-CM

## 2021-06-17 DIAGNOSIS — R52 PAIN: ICD-10-CM

## 2021-06-17 RX ORDER — OXYCODONE AND ACETAMINOPHEN 7.5; 325 MG/1; MG/1
1 TABLET ORAL EVERY 6 HOURS PRN
Qty: 120 TABLET | Refills: 0 | Status: SHIPPED | OUTPATIENT
Start: 2021-06-17 | End: 2021-07-19 | Stop reason: SDUPTHER

## 2021-07-19 DIAGNOSIS — C64.9 RENAL CELL CARCINOMA, UNSPECIFIED LATERALITY (HCC): ICD-10-CM

## 2021-07-19 DIAGNOSIS — C64.9 CARCINOMA OF KIDNEY, UNSPECIFIED LATERALITY (HCC): ICD-10-CM

## 2021-07-19 DIAGNOSIS — R52 PAIN: ICD-10-CM

## 2021-07-19 RX ORDER — OXYCODONE AND ACETAMINOPHEN 7.5; 325 MG/1; MG/1
1 TABLET ORAL EVERY 6 HOURS PRN
Qty: 120 TABLET | Refills: 0 | Status: SHIPPED | OUTPATIENT
Start: 2021-07-19 | End: 2021-08-19 | Stop reason: SDUPTHER

## 2021-08-19 DIAGNOSIS — C64.9 RENAL CELL CARCINOMA, UNSPECIFIED LATERALITY (HCC): ICD-10-CM

## 2021-08-19 DIAGNOSIS — C64.9 CARCINOMA OF KIDNEY, UNSPECIFIED LATERALITY (HCC): ICD-10-CM

## 2021-08-19 DIAGNOSIS — R52 PAIN: ICD-10-CM

## 2021-08-19 RX ORDER — OXYCODONE AND ACETAMINOPHEN 7.5; 325 MG/1; MG/1
1 TABLET ORAL EVERY 6 HOURS PRN
Qty: 120 TABLET | Refills: 0 | Status: SHIPPED | OUTPATIENT
Start: 2021-08-19 | End: 2021-09-09 | Stop reason: SDUPTHER

## 2021-09-09 DIAGNOSIS — R52 PAIN: ICD-10-CM

## 2021-09-09 DIAGNOSIS — C64.9 CARCINOMA OF KIDNEY, UNSPECIFIED LATERALITY (HCC): ICD-10-CM

## 2021-09-09 DIAGNOSIS — C64.9 RENAL CELL CARCINOMA, UNSPECIFIED LATERALITY (HCC): ICD-10-CM

## 2021-09-09 RX ORDER — OXYCODONE AND ACETAMINOPHEN 7.5; 325 MG/1; MG/1
1 TABLET ORAL EVERY 6 HOURS PRN
Qty: 120 TABLET | Refills: 0 | Status: SHIPPED | OUTPATIENT
Start: 2021-09-09 | End: 2021-10-19 | Stop reason: SDUPTHER

## 2021-10-19 DIAGNOSIS — C64.9 RENAL CELL CARCINOMA, UNSPECIFIED LATERALITY (HCC): ICD-10-CM

## 2021-10-19 DIAGNOSIS — C64.9 CARCINOMA OF KIDNEY, UNSPECIFIED LATERALITY (HCC): ICD-10-CM

## 2021-10-19 DIAGNOSIS — R52 PAIN: ICD-10-CM

## 2021-10-19 RX ORDER — OXYCODONE AND ACETAMINOPHEN 7.5; 325 MG/1; MG/1
1 TABLET ORAL EVERY 6 HOURS PRN
Qty: 120 TABLET | Refills: 0 | Status: SHIPPED | OUTPATIENT
Start: 2021-10-19 | End: 2021-11-18 | Stop reason: SDUPTHER

## 2021-11-18 DIAGNOSIS — R52 PAIN: ICD-10-CM

## 2021-11-18 DIAGNOSIS — C64.9 RENAL CELL CARCINOMA, UNSPECIFIED LATERALITY (HCC): ICD-10-CM

## 2021-11-18 DIAGNOSIS — C64.9 CARCINOMA OF KIDNEY, UNSPECIFIED LATERALITY (HCC): ICD-10-CM

## 2021-11-18 RX ORDER — OXYCODONE AND ACETAMINOPHEN 7.5; 325 MG/1; MG/1
1 TABLET ORAL EVERY 6 HOURS PRN
Qty: 120 TABLET | Refills: 0 | Status: SHIPPED | OUTPATIENT
Start: 2021-11-18 | End: 2022-03-09 | Stop reason: SDUPTHER

## 2022-03-09 DIAGNOSIS — R52 PAIN: ICD-10-CM

## 2022-03-09 DIAGNOSIS — C64.9 RENAL CELL CARCINOMA, UNSPECIFIED LATERALITY: ICD-10-CM

## 2022-03-09 DIAGNOSIS — C64.9 CARCINOMA OF KIDNEY, UNSPECIFIED LATERALITY: ICD-10-CM

## 2022-03-09 RX ORDER — OXYCODONE AND ACETAMINOPHEN 7.5; 325 MG/1; MG/1
1 TABLET ORAL EVERY 6 HOURS PRN
Qty: 120 TABLET | Refills: 0 | Status: SHIPPED | OUTPATIENT
Start: 2022-03-09 | End: 2022-04-14 | Stop reason: SDUPTHER

## 2022-03-09 NOTE — TELEPHONE ENCOUNTER
Caller: Annemarie Reynolds    Relationship: Emergency Contact    Best call back number: [phone]  MAY CALL BACK ANYTIME AND LEAVE VM IF NEEDED.    Requested Prescriptions: oxyCODONE-acetaminophen (PERCOCET) 7.5-325 MG per tablet    Pharmacy where request should be sent:  [facility] [place], [place] - [address] - [phone] [facility] [phone]    [address], [facility] [address]   Phone:  [phone]  Fax:  [phone]    Additional details provided by patient: PATIENT HAS BEEN OUT OF MEDICATION FOR A FEW MONTHS NOW AND NEEDS A REFILL.    Does the patient have less than a 3 day supply:  [x] Yes  [] No      DJC/Valente Waters Rep   03/09/22 16:25 EST

## 2022-04-14 ENCOUNTER — TELEPHONE (OUTPATIENT)
Dept: ONCOLOGY | Facility: CLINIC | Age: 72
End: 2022-04-14

## 2022-04-14 DIAGNOSIS — R52 PAIN: ICD-10-CM

## 2022-04-14 DIAGNOSIS — C64.9 RENAL CELL CARCINOMA, UNSPECIFIED LATERALITY: ICD-10-CM

## 2022-04-14 DIAGNOSIS — C64.9 CARCINOMA OF KIDNEY, UNSPECIFIED LATERALITY: ICD-10-CM

## 2022-04-14 RX ORDER — OXYCODONE AND ACETAMINOPHEN 7.5; 325 MG/1; MG/1
1 TABLET ORAL EVERY 6 HOURS PRN
Qty: 120 TABLET | Refills: 0 | Status: SHIPPED | OUTPATIENT
Start: 2022-04-14 | End: 2022-05-24 | Stop reason: SDUPTHER

## 2022-04-14 NOTE — TELEPHONE ENCOUNTER
Caller: Annemarie Reynolds    Relationship: Emergency Contact    Best call back number: [phone]    Requested Prescriptions:   OXYCODONE 7.5     Pharmacy where request should be sent:    [facility] [place], [place] - [address] - [phone]  - [phone] FX      Does the patient have less than a 3 day supply:  [x] Yes  [] No    Valente ARRIETA Rep   04/14/22 12:44 EDT

## 2022-05-24 DIAGNOSIS — C64.9 CARCINOMA OF KIDNEY, UNSPECIFIED LATERALITY: ICD-10-CM

## 2022-05-24 DIAGNOSIS — R52 PAIN: ICD-10-CM

## 2022-05-24 DIAGNOSIS — C64.9 RENAL CELL CARCINOMA, UNSPECIFIED LATERALITY: ICD-10-CM

## 2022-05-24 RX ORDER — OXYCODONE AND ACETAMINOPHEN 7.5; 325 MG/1; MG/1
1 TABLET ORAL EVERY 6 HOURS PRN
Qty: 120 TABLET | Refills: 0 | Status: SHIPPED | OUTPATIENT
Start: 2022-05-24 | End: 2022-06-21 | Stop reason: SDUPTHER

## 2022-05-24 NOTE — TELEPHONE ENCOUNTER
Caller: Annemarie Reynolds    Relationship: Emergency Contact    Best call back number:[phone]     Requested Prescriptions:   Requested Prescriptions     Pending Prescriptions Disp Refills   • oxyCODONE-acetaminophen (PERCOCET) 7.5-325 MG per tablet 120 tablet 0     Sig: Take 1 tablet by mouth Every 6 (Six) Hours As Needed for Moderate Pain .        Pharmacy where request should be sent: [facility] [employer] [place], [place] - [address]  - [phone] [facility] [phone] FX       Does the patient have less than a 3 day supply:  [] Yes  [x] No    Valente Kaplan Rep   05/24/22 12:31 EDT

## 2022-06-21 DIAGNOSIS — R52 PAIN: ICD-10-CM

## 2022-06-21 DIAGNOSIS — C64.9 CARCINOMA OF KIDNEY, UNSPECIFIED LATERALITY: ICD-10-CM

## 2022-06-21 DIAGNOSIS — C64.9 RENAL CELL CARCINOMA, UNSPECIFIED LATERALITY: ICD-10-CM

## 2022-06-21 RX ORDER — OXYCODONE AND ACETAMINOPHEN 7.5; 325 MG/1; MG/1
1 TABLET ORAL EVERY 6 HOURS PRN
Qty: 120 TABLET | Refills: 0 | Status: SHIPPED | OUTPATIENT
Start: 2022-06-21 | End: 2022-07-25 | Stop reason: SDUPTHER

## 2022-07-13 ENCOUNTER — TELEPHONE (OUTPATIENT)
Dept: ONCOLOGY | Facility: CLINIC | Age: 72
End: 2022-07-13

## 2022-07-13 NOTE — TELEPHONE ENCOUNTER
JUAN JOSÉ IS NEEDING CARLEE'S RECORD FAXED OVER, SHE IS THERE IN OFFICE NOW       FAX # [phone]

## 2022-07-25 DIAGNOSIS — R52 PAIN: ICD-10-CM

## 2022-07-25 DIAGNOSIS — C64.9 RENAL CELL CARCINOMA, UNSPECIFIED LATERALITY: ICD-10-CM

## 2022-07-25 DIAGNOSIS — C64.9 CARCINOMA OF KIDNEY, UNSPECIFIED LATERALITY: ICD-10-CM

## 2022-07-25 RX ORDER — OXYCODONE AND ACETAMINOPHEN 7.5; 325 MG/1; MG/1
1 TABLET ORAL EVERY 6 HOURS PRN
Qty: 120 TABLET | Refills: 0 | Status: SHIPPED | OUTPATIENT
Start: 2022-07-25 | End: 2022-08-31 | Stop reason: SDUPTHER

## 2022-07-25 NOTE — TELEPHONE ENCOUNTER
Caller: Nancy Berman    Relationship: Self    Best call back number: [phone]    Requested Prescriptions:   Requested Prescriptions     Pending Prescriptions Disp Refills   • oxyCODONE-acetaminophen (PERCOCET) 7.5-325 MG per tablet 120 tablet 0     Sig: Take 1 tablet by mouth Every 6 (Six) Hours As Needed for Moderate Pain .        Pharmacy where request should be sent: [facility] [employer] [facility] [place], [place] - [address]  [phone] [facility] [phone] FX     Additional details provided by patient:     Does the patient have less than a 3 day supply:  [x] Yes  [] No    Valente Palomino Rep   07/25/22 14:02 EDT

## 2022-08-31 DIAGNOSIS — C64.9 RENAL CELL CARCINOMA, UNSPECIFIED LATERALITY: ICD-10-CM

## 2022-08-31 DIAGNOSIS — R52 PAIN: ICD-10-CM

## 2022-08-31 DIAGNOSIS — C64.9 CARCINOMA OF KIDNEY, UNSPECIFIED LATERALITY: ICD-10-CM

## 2022-08-31 RX ORDER — OXYCODONE AND ACETAMINOPHEN 7.5; 325 MG/1; MG/1
1 TABLET ORAL EVERY 6 HOURS PRN
Qty: 120 TABLET | Refills: 0 | Status: SHIPPED | OUTPATIENT
Start: 2022-08-31 | End: 2022-10-06 | Stop reason: SDUPTHER

## 2022-08-31 NOTE — TELEPHONE ENCOUNTER
Caller: Nancy Berman    Relationship: Self    Best call back number: [phone]    Requested Prescriptions:   Requested Prescriptions     Pending Prescriptions Disp Refills   • oxyCODONE-acetaminophen (PERCOCET) 7.5-325 MG per tablet 120 tablet 0     Sig: Take 1 tablet by mouth Every 6 (Six) Hours As Needed for Moderate Pain.        Pharmacy where request should be sent: [facility] [place], [place] - [address]  [phone] [facility] [phone] FX     Additional details provided by patient:     Does the patient have less than a 3 day supply:  [x] Yes  [] No

## 2022-10-06 DIAGNOSIS — R52 PAIN: ICD-10-CM

## 2022-10-06 DIAGNOSIS — C64.9 RENAL CELL CARCINOMA, UNSPECIFIED LATERALITY: ICD-10-CM

## 2022-10-06 DIAGNOSIS — C64.9 CARCINOMA OF KIDNEY, UNSPECIFIED LATERALITY: ICD-10-CM

## 2022-10-07 RX ORDER — OXYCODONE AND ACETAMINOPHEN 7.5; 325 MG/1; MG/1
1 TABLET ORAL EVERY 6 HOURS PRN
Qty: 120 TABLET | Refills: 0 | Status: SHIPPED | OUTPATIENT
Start: 2022-10-07 | End: 2022-11-11 | Stop reason: SDUPTHER

## 2022-11-11 DIAGNOSIS — C64.9 RENAL CELL CARCINOMA, UNSPECIFIED LATERALITY: ICD-10-CM

## 2022-11-11 DIAGNOSIS — C64.9 CARCINOMA OF KIDNEY, UNSPECIFIED LATERALITY: ICD-10-CM

## 2022-11-11 DIAGNOSIS — R52 PAIN: ICD-10-CM

## 2022-11-11 RX ORDER — OXYCODONE AND ACETAMINOPHEN 7.5; 325 MG/1; MG/1
1 TABLET ORAL EVERY 6 HOURS PRN
Qty: 120 TABLET | Refills: 0 | Status: SHIPPED | OUTPATIENT
Start: 2022-11-11 | End: 2022-12-15 | Stop reason: SDUPTHER

## 2022-11-11 NOTE — TELEPHONE ENCOUNTER
Caller: Annemarie Reynolds    Relationship: Emergency Contact    Best call back number: [phone]    Requested Prescriptions:   Requested Prescriptions     Pending Prescriptions Disp Refills   • oxyCODONE-acetaminophen (PERCOCET) 7.5-325 MG per tablet 120 tablet 0     Sig: Take 1 tablet by mouth Every 6 (Six) Hours As Needed for Moderate Pain.        Pharmacy where request should be sent: [facility] [facility] [place], [place] - [address] - [phone] [facility] [phone] FX     Additional details provided by patient:     Does the patient have less than a 3 day supply:  [x] Yes  [] No

## 2022-12-15 DIAGNOSIS — C64.9 CARCINOMA OF KIDNEY, UNSPECIFIED LATERALITY: ICD-10-CM

## 2022-12-15 DIAGNOSIS — C64.9 RENAL CELL CARCINOMA, UNSPECIFIED LATERALITY: ICD-10-CM

## 2022-12-15 DIAGNOSIS — R52 PAIN: ICD-10-CM

## 2022-12-15 NOTE — TELEPHONE ENCOUNTER
Caller: Annemarie Reynolds    Relationship: Emergency Contact    Best call back number: [phone]    Requested Prescriptions:   Requested Prescriptions     Pending Prescriptions Disp Refills   • oxyCODONE-acetaminophen (PERCOCET) 7.5-325 MG per tablet 120 tablet 0     Sig: Take 1 tablet by mouth Every 6 (Six) Hours As Needed for Moderate Pain.        Pharmacy where request should be sent: [facility] [place], [place] - [address] [facility] [phone] [facility] [phone] FX     Additional details provided by patient:     Does the patient have less than a 3 day supply:  [x] Yes  [] No    Would you like a call back once the refill request has been completed: [x] Yes [] No    If the office needs to give you a call back, can they leave a voicemail: [x] Yes [] No

## 2022-12-16 RX ORDER — OXYCODONE AND ACETAMINOPHEN 7.5; 325 MG/1; MG/1
1 TABLET ORAL EVERY 6 HOURS PRN
Qty: 120 TABLET | Refills: 0 | Status: SHIPPED | OUTPATIENT
Start: 2022-12-16 | End: 2023-02-15 | Stop reason: SDUPTHER

## 2022-12-20 ENCOUNTER — OFFICE VISIT (OUTPATIENT)
Dept: ONCOLOGY | Facility: CLINIC | Age: 72
End: 2022-12-20

## 2022-12-20 ENCOUNTER — LAB (OUTPATIENT)
Dept: ONCOLOGY | Facility: CLINIC | Age: 72
End: 2022-12-20

## 2022-12-20 VITALS
HEART RATE: 69 BPM | SYSTOLIC BLOOD PRESSURE: 131 MMHG | WEIGHT: 179.2 LBS | OXYGEN SATURATION: 93 % | BODY MASS INDEX: 29.82 KG/M2 | RESPIRATION RATE: 18 BRPM | TEMPERATURE: 97.3 F | DIASTOLIC BLOOD PRESSURE: 79 MMHG

## 2022-12-20 DIAGNOSIS — C64.9 CARCINOMA OF KIDNEY, UNSPECIFIED LATERALITY: Primary | ICD-10-CM

## 2022-12-20 DIAGNOSIS — R52 PAIN: ICD-10-CM

## 2022-12-20 LAB
ALBUMIN SERPL-MCNC: 3.7 G/DL (ref 3.5–5.2)
ALBUMIN/GLOB SERPL: 0.9 G/DL
ALP SERPL-CCNC: 155 U/L (ref 39–117)
ALT SERPL W P-5'-P-CCNC: 11 U/L (ref 1–33)
ANION GAP SERPL CALCULATED.3IONS-SCNC: 11.7 MMOL/L (ref 5–15)
AST SERPL-CCNC: 17 U/L (ref 1–32)
BASOPHILS # BLD AUTO: 0.05 10*3/MM3 (ref 0–0.2)
BASOPHILS NFR BLD AUTO: 0.7 % (ref 0–1.5)
BILIRUB SERPL-MCNC: 0.3 MG/DL (ref 0–1.2)
BUN SERPL-MCNC: 25 MG/DL (ref 8–23)
BUN/CREAT SERPL: 15.5 (ref 7–25)
CALCIUM SPEC-SCNC: 8.6 MG/DL (ref 8.6–10.5)
CHLORIDE SERPL-SCNC: 105 MMOL/L (ref 98–107)
CO2 SERPL-SCNC: 17.3 MMOL/L (ref 22–29)
CREAT SERPL-MCNC: 1.61 MG/DL (ref 0.57–1)
DEPRECATED RDW RBC AUTO: 49.7 FL (ref 37–54)
EGFRCR SERPLBLD CKD-EPI 2021: 34.1 ML/MIN/1.73
EOSINOPHIL # BLD AUTO: 0.19 10*3/MM3 (ref 0–0.4)
EOSINOPHIL NFR BLD AUTO: 2.7 % (ref 0.3–6.2)
ERYTHROCYTE [DISTWIDTH] IN BLOOD BY AUTOMATED COUNT: 12.2 % (ref 12.3–15.4)
GLOBULIN UR ELPH-MCNC: 4 GM/DL
GLUCOSE SERPL-MCNC: 266 MG/DL (ref 65–99)
HCT VFR BLD AUTO: 43.5 % (ref 34–46.6)
HGB BLD-MCNC: 12.5 G/DL (ref 12–15.9)
HYPOCHROMIA BLD QL: NORMAL
IMM GRANULOCYTES # BLD AUTO: 0.02 10*3/MM3 (ref 0–0.05)
IMM GRANULOCYTES NFR BLD AUTO: 0.3 % (ref 0–0.5)
LYMPHOCYTES # BLD AUTO: 1.97 10*3/MM3 (ref 0.7–3.1)
LYMPHOCYTES NFR BLD AUTO: 28.1 % (ref 19.6–45.3)
MACROCYTES BLD QL SMEAR: NORMAL
MCH RBC QN AUTO: 31.4 PG (ref 26.6–33)
MCHC RBC AUTO-ENTMCNC: 28.7 G/DL (ref 31.5–35.7)
MCV RBC AUTO: 109.3 FL (ref 79–97)
MONOCYTES # BLD AUTO: 0.66 10*3/MM3 (ref 0.1–0.9)
MONOCYTES NFR BLD AUTO: 9.4 % (ref 5–12)
NEUTROPHILS NFR BLD AUTO: 4.12 10*3/MM3 (ref 1.7–7)
NEUTROPHILS NFR BLD AUTO: 58.8 % (ref 42.7–76)
NRBC BLD AUTO-RTO: 0 /100 WBC (ref 0–0.2)
PLAT MORPH BLD: NORMAL
PLATELET # BLD AUTO: 293 10*3/MM3 (ref 140–450)
PMV BLD AUTO: 10.3 FL (ref 6–12)
POTASSIUM SERPL-SCNC: 5.3 MMOL/L (ref 3.5–5.2)
PROT SERPL-MCNC: 7.7 G/DL (ref 6–8.5)
RBC # BLD AUTO: 3.98 10*6/MM3 (ref 3.77–5.28)
SODIUM SERPL-SCNC: 134 MMOL/L (ref 136–145)
WBC NRBC COR # BLD: 7.01 10*3/MM3 (ref 3.4–10.8)

## 2022-12-20 PROCEDURE — 36415 COLL VENOUS BLD VENIPUNCTURE: CPT | Performed by: NURSE PRACTITIONER

## 2022-12-20 PROCEDURE — 99214 OFFICE O/P EST MOD 30 MIN: CPT | Performed by: NURSE PRACTITIONER

## 2022-12-20 PROCEDURE — 85007 BL SMEAR W/DIFF WBC COUNT: CPT | Performed by: NURSE PRACTITIONER

## 2022-12-20 PROCEDURE — 80053 COMPREHEN METABOLIC PANEL: CPT | Performed by: NURSE PRACTITIONER

## 2022-12-20 PROCEDURE — 85025 COMPLETE CBC W/AUTO DIFF WBC: CPT | Performed by: NURSE PRACTITIONER

## 2022-12-20 RX ORDER — QUETIAPINE FUMARATE 25 MG/1
25 TABLET, FILM COATED ORAL NIGHTLY
COMMUNITY

## 2022-12-20 NOTE — PROGRESS NOTES
"  Name:  Nancy Berman  :  1950  Date:  2022     REFERRING PROVIDER  ANGELA Clark    PRIMARY CARE PROVIDER  Julee Arechiga APRN    REASON FOR FOLLOWUP  1. Carcinoma of kidney, unspecified laterality (HCC)      CHIEF COMPLAINT  Chronic low back pain, legs pains, still overall manageable with prn Percocet.    Dear Ms. Michelsnut,    HISTORY OF PRESENT ILLNESS:   I saw Ms. Berman in follow up today in our medical oncology clinic. As you are aware, she is a pleasant, 72 y.o.,  female with a history of hypertension, diabetes and chronic kidney disease who was initially diagnosed with, and underwent an uncomplicated resection of, a left-sided kidney cancer in . Adjuvant therapy was not indicated. Ultimately, she had been doing very well from the standpoint of this disease ever since then (for the past ten years). However, in early summer 2018, as part of a routine follow up evaluation for her chronic kidney disease (of her remaining, right kidney), you ordered a renal ultrasound. Unexpectedly, this study identified a retroperitoneal mass; and subsequent CT scans (performed in late 2018) showed \"nodular foci in the retroperitoneum\", a \"heterogeneous lesion inferior in the right lobe of the liver\" and \"small, tiny peripheral densities in the lungs most noticeable on the right\", consistent with more widespread and metastatic disease. She underwent a CT-guided biopsy of a retroperitoneal mass at  on 2018, and the pathology results were consistent with recurrent, renal cell carcinoma. She was subsequently referred to our clinic for further evaluation and management.    INTERIM HISTORY:  Ms. Berman returns to clinic today for follow up again accompanied by her daughter. She has a history of longstanding back pain (which, for years, has had a tendency to worsen when she stands up to wash dishes and forces her to sit down periodically to rest) and fatigue. " "Her low back pain remains controlled on her current schedule of prn Percocet (when she is taking it; she occasionally runs out of her supply and \"forgets\" to get it refilled in a timely fashion). She underwent resection of her recurrent, retroperitoneal mass by  urology on 07/22/2019. This procedure went very well, and she has continued to do very well from the standpoint of her malignancy ever since then. She followed with  in January 2022 with repeat imaging which remained stable. She otherwise continues to feel the same as she usually does, and she again has no new or other specific complaints.    Past Medical History:   Diagnosis Date   • Arthritis    • Cancer (HCC)     left Kidney   • Chronic kidney disease, stage III (moderate) (HCC)    • Diabetes mellitus (HCC)    • High cholesterol    • Hypertension    • Low back pain    • Spinal stenosis        Past Surgical History:   Procedure Laterality Date   • BLADDER SURGERY     • HYSTERECTOMY     • KIDNEY SURGERY Left     Nephrectomy for cancerous mass   • IL OPEN BHAVANA FIXATN HUMERAL SHAFT FX Left 4/20/2017    Procedure: LEFT HUMERUS INTRAMEDULLARY NAIL/BHAVANA INSERTION;  Surgeon: Jose Willard MD;  Location: [facility];  Service: Orthopedics       Social History     Socioeconomic History   • Marital status: Legally    Tobacco Use   • Smoking status: Never   • Smokeless tobacco: Never   Substance and Sexual Activity   • Alcohol use: Yes     Comment: social   • Drug use: Defer   • Sexual activity: Defer       Family History   Problem Relation Age of Onset   • Parkinsonism Mother    • Heart disease Father    • Diabetes Father    • Cancer Brother        Allergies   Allergen Reactions   • Sulfa Antibiotics        Current Outpatient Medications   Medication Sig Dispense Refill   • amLODIPine (NORVASC) 5 MG tablet Take 5 mg by mouth Daily.     • aspirin 325 MG tablet Take 325 mg by mouth daily.     • atenolol (TENORMIN) 50 MG tablet      • " carbidopa-levodopa (SINEMET)  MG per tablet Take 1 tablet by mouth 3 (Three) Times a Day.     • oxyCODONE-acetaminophen (PERCOCET) 7.5-325 MG per tablet Take 1 tablet by mouth Every 6 (Six) Hours As Needed for Moderate Pain. 120 tablet 0   • QUEtiapine (SEROquel) 25 MG tablet Take 25 mg by mouth Every Night.     • gabapentin (NEURONTIN) 600 MG tablet Take 600 mg by mouth 3 (three) times a day.     • metFORMIN XR (GLUCOPHAGE-XR) 500 MG 24 hr tablet Take 500 mg by mouth daily with breakfast.       No current facility-administered medications for this visit.     REVIEW OF SYSTEMS  CONSTITUTIONAL:  No fever, chills or night sweats. Chronic fatigue, stable.  EYES:  No blurry vision, diplopia or other vision changes.  ENT:  No hearing loss, nosebleeds or sore throat.  CARDIOVASCULAR:  No palpitations, arrhythmia, syncopal episodes or edema.  PULMONARY:  No hemoptysis, wheezing, chronic cough or shortness of breath.  GASTROINTESTINAL:  No nausea or vomiting.  No constipation or diarrhea. No abdominal pain.  GENITOURINARY:  No hematuria or kidney stones. Recent burning with urination, for which she is starting an antibiotic today, as per the HPI above.  MUSCULOSKELETAL:  As per the HPI above.  INTEGUMENTARY: No rashes or pruritus.  ENDOCRINE:  No excessive thirst or hot flashes.  HEMATOLOGIC:  No history of free bleeding, spontaneous bleeding or clotting.  IMMUNOLOGIC:  No allergies or frequent infections.  NEUROLOGIC: No numbness, tingling, seizures or weakness.  PSYCHIATRIC:  Mild chronic anxiety, stable.    PHYSICAL EXAMINATION  /79   Pulse 69   Temp 97.3 °F (36.3 °C) (Temporal)   Resp 18   Wt 81.3 kg (179 lb 3.2 oz)   SpO2 93%   BMI 29.82 kg/m²     ECOG score: 0   GENERAL:  A well-developed, well-nourished, elderly,  female in no acute distress, sitting in a wheelchair.  HEENT:  Pupils equally round and reactive to light. Extraocular muscles intact.  CARDIOVASCULAR:  Regular rate and  rhythm. No murmurs, gallops or rubs.  LUNGS:  Clear to auscultation bilaterally.  ABDOMEN:  Soft, nontender, nondistended with positive bowel sounds.  EXTREMITIES:  No clubbing, cyanosis or edema bilaterally.  SKIN:  No rashes or petechiae.  NEURO:  Cranial nerves grossly intact. No focal deficits. Mild, resting tremors, stable.  PSYCH:  Alert and oriented x3.    LABORATORY    Lab Results   Component Value Date    WBC 7.01 12/20/2022    HGB 12.5 12/20/2022    HCT 43.5 12/20/2022    .3 (H) 12/20/2022     12/20/2022    NEUTROABS 4.12 12/20/2022       Lab Results   Component Value Date     (L) 12/20/2022    K 5.3 (H) 12/20/2022     12/20/2022    CO2 17.3 (L) 12/20/2022    BUN 25 (H) 12/20/2022    CREATININE 1.61 (H) 12/20/2022    GLUCOSE 266 (H) 12/20/2022    CALCIUM 8.6 12/20/2022    AST 17 12/20/2022    ALT 11 12/20/2022    ALKPHOS 155 (H) 12/20/2022    BILITOT 0.3 12/20/2022    PROTEINTOT 7.7 12/20/2022    ALBUMIN 3.70 12/20/2022     CBC (12/20/2022): WBCs: 7.0; HgB: 12.5; Hct: 43.5; platelets: 293  CBC (03/05/2021): WBCs: 4.4; HgB: 13.8; Hct: 43.8; platelets: 263  CBC (12/11/2020): WBCs: 7.2; HgB: 13.2; Hct: 41.0; platelets: 290  CBC (05/21/2020): WBCs: 6.9; HgB: 14.2; Hct: 44.3; platelets: 282  CBC (10/22/2019): WBCs: 6.1; HgB: 12.8; Hct: 39.1; platelets: 318  CBC (06/14/2019): WBCs: 4.7; HgB: 12.7; Hct: 38.7; platelets: 252  CBC (03/05/2019): WBCs: 5.9; HgB: 13.6; Hct: 41.4; platelets: 264  CBC (11/20/2018): WBCs: 5.3; HgB: 13.0; Hct: 40.4; platelets: 243  CBC (08/23/2018): WBCs: 5.7; HgB: 13.8; Hct: 43.4; platelets: 248    IMAGING  CT chest, abdomen and pelvis with contrast (06/30/2018, at ):  Impression:  1) Small, tiny peripheral densities in the lungs most noticeable on the right.  2) Heterogeneous lesion inferior in the right lobe of the liver.  3) Heterogeneous enhancing nodular foci in the retroperitoneum.  4) Vascular calcifications with moderate coronary artery disease.  5)  Moderate degenerative changes in the spine with moderate spinal canal stenosis.    CT chest without contrast (08/16/2018):  Impression: Negative CT scan of the thorax. Nothing is seen to suggest metastatic disease in the chest.    CT abdomen and pelvis without contrast (08/16/2018):  Impression: Patient status post right nephrectomy. There is a low density mass in the retroperitoneum that would be consistent with metastatic disease to the retroperitoneal lymph nodes possibly involving the inferior vena cava. It measures approximately 3.8 cm in diameter.    NM bone scan, whole body (09/27/2018):  Impression: There are no findings to suggest metastatic disease or to explain the patient's symptoms.    CT chest without contrast (11/20/2018):  Impression: Stable CT scan of the thorax. Nothing seen to suggest metastatic disease. There is fairly heavy coronary artery calcification. There were no pulmonary parenchymal lung nodules or pleural effusions. No enlarged lymph nodes were demonstrated.    CT abdomen and pelvis without contrast (11/20/2018):  Impression:  Patient status post right nephrectomy. There continues to be some abnormal soft tissue density in the retroperitoneum in the pericaval area concerning for retroperitoneal lymphadenopathy. This is completely unchanged, however, from 08/2018 exam.    CT abdomen and pelvis without contrast (03/05/2019):  Impression: Stable appearance of the abdomen including soft tissue density mass in the retroperitoneal region. There continues to be some abnormal nearly 7 cm soft tissue density in the retroperitoneum in the pericaval area that remains stable.    CT chest, abdomen and pelvis with contrast (10/30/2019):  Impression: A noncalcified nodule right lower lobe on image 54 series 3 measures 7 x 6 mm and appears slightly larger, previously measuring 6 x 4 mm. No new nodules. Interval resection of retroperitoneal maxwell masses. Stable, lobulated mass in the lower right hepatic  lobe.    CT chest without contrast (05/20/2020):  Impression:  1) No parenchymal soft tissue nodules or masses.  2) No pericardial or pleural effusions.  3) No adenopathy.    CT chest, abdomen and pelvis with contrast (08/05/2020, compared to 10/30/2019, at ):  Impression: No evidence of disease progression with unchanged 8 mm noncalcified right lower lobe pulmonary nodule. Unchanged, lobulated mass in the right hemiliver. No evidence of disease progression.    NM bone scan, whole body (12/30/2020):  Impression: No evidence of metastatic disease to the skeleton.    CT chest with contrast (02/26/2021):  Impression: At this time there are no CT findings to suggest metastatic disease in the chest from the patient's renal cell carcinoma.    CT abdomen and pelvis with contrast (02/26/2021, compared to 02/22/2019):  Impression: The patient has developed an area of abnormal density in the right lobe of the liver not seen on the previous CTs. It measures 28 x 12 mm. [Upon further review with radiology, while this area was not seen on the previous, noncontrasted CTs of the abdomen, it is visible (and stable) on an available MRI from back in 2009.] The soft tissue abnormality in the retroperitoneum near the level of the renal vein is improved in comparing with earlier CTs. The right kidney is absent consistent with previous nephrectomy.    CT chest with contrast  (01/19/2022):  IMPRESSION:   1) Stable right lower lobe 8 mm pulmonary nodule unchanged dating back to 2019. No new pulmonary nodule or evidence of metastatic disease of the chest     2) Stable appearance of lobulated right hemiliver lesion without interval change or evidence for progression in the abdomen and pelvis. Stable appearance of the right nephrectomy site.      CT abdomen and pelvis with contrast (01/19/2022):  IMPRESSION:   1) Stable right lower lobe 8 mm pulmonary nodule unchanged dating back to 2019. No new pulmonary nodule or evidence of metastatic  disease of the chest     2) Stable appearance of lobulated right hemiliver lesion without interval change or evidence for progression in the abdomen and pelvis. Stable appearance of the right nephrectomy site.     PATHOLOGY  Right kidney, radical nephrectomy (11/05/2008, per pathology report; outside slides):  Renal cell carcinoma, clear cell type. Unremarkable adrenal gland.    Right retroperitoneal mass (07/12/2018):  Positive for malignancy; consistent with recurrent renal cell carcinoma. PAX8 is strongly positive in tumor cells, and Inhibin is negative.    Retroperitoneal mass, resection (07/22/2019):  Metastatic renal cell carcinoma.    IMPRESSION AND PLAN  Ms. Berman is a 72 y.o.,  female with:  1. Renal cell carcinoma: Initially diagnosed in 2008 and status post a left-sided nephrectomy, with no signs/symptoms of recurrent disease for the next ten years. Unfortunately, as of June 2018, this was/is no longer the case. CT scans performed at  on 06/30/2018 identified multiple foci in the retroperitoneum (along with possible lung lesions; although lung imaging since then has been less concerning), consistent with (and CT-guided FNA confirmed) recurrent, and now metastatic, disease. Dr. Graham has had multiple, long discussions with the patient and her daughters since the time of her initial presentation in our clinic (on 07/25/2018) regarding this diagnosis and, in general terms, its prognosis. In short, they remain aware that this disease is likely no longer curable; but that, particularly given her good performance status, it was (and remains) treatable. While there are have always been multiple, systemic options available to us (including PO TKIs, such as pazopanib, and IV immunotherapy, such as qmonthly nivolumab), it has continued to be reasonable to defer starting any specific, systemic treatment to date. Renal cell carcinoma is typically slow growing, and this appears to especially be  the case here. She did not relapse for ten years, the CT scans performed in late June 2018 at  showed a low, tumor burden (and this continued to be the case from that point); and she was (and remains) minimally (other than issue #2) symptomatic. In general, the longer an elderly patient with multiple comobidities can avoid the potential side effects of palliative treatment, the better. Given her continued, very stable, oligometastatic disease, by early 2019 it seemed worthwhile to send her back to urology for reevaluation to consider resecting the retroperitoneal, soft tissue mass. This referral was made in Spring 2019, and following an evaluation at  (which included an MRI to make sure the mass did not appear to involve the great vessels),  urology ultimately took her to the OR on 07/22/2019. This procedure went extremely well. Today, over two (2) years later, she has recovered fully and continues to have no complaints other than her chronic back pains. She most recently followed up with  urology on 01/19/2022, and repeat CT scans at that time (summarized above) again showed no definite evidence of residual/recurrent disease. She will follow up with  urology on an as needed basis. They have recommended to repeat CT imaging annually (which will be due January 2023). We will follow up in our clinic in 6 months with CBC and CMP.  2. Back pain: A chronic issue which the retroperitoneal foci from issue #1 was likely exacerbating; but which, now that the mass has been excised, is likely primarily due to longstanding arthritis. As her neck pain has recently been a little worse, we further evaluated with a repeat NM bone scan; and this study, performed on 12/30/2020 (and summarized above) remained stable and completely unremarkable. With her diagnosis with issue #1, our clinic has agreed to provide her with her pain medicine. Continue prn Percocet 7.5/325 mg q6hr prn. Continue to monitor.  3. MAGDIEL: Daughter  reports that patient does not hydrate well. Recommended to push oral hydration with 4-5 bottles of water daily.  The patient and her daughter were in agreement with these plans.    It is a pleasure to participate in Ms. Berman's care. Please do not hesitate to call with any questions or concerns that you may have.    I spent 30 minutes with Nancy Berman today.  In the office today, more than 50% of this time was spent face-to-face with her  in counseling / coordination of care, reviewing her interim medical history and counseling on the current treatment plan.  All questions were answered to her satisfaction.         FOLLOW UP  Continue Percocet 7.5 mg PO q6hr prn. Repeat CT imaging in January 2023. Return to our clinic in 6 months, with a CBC and CMP.            This document was electronically signed by ANGELA Jaime December 20, 2022 10:36 EST      CC: ANGELA Clark MD

## 2022-12-27 DIAGNOSIS — C64.9 RENAL CELL CARCINOMA, UNSPECIFIED LATERALITY: Primary | ICD-10-CM

## 2023-01-17 ENCOUNTER — HOSPITAL ENCOUNTER (OUTPATIENT)
Dept: CT IMAGING | Facility: HOSPITAL | Age: 73
Discharge: HOME | End: 2023-01-17
Admitting: NURSE PRACTITIONER
Payer: MEDICARE

## 2023-01-17 DIAGNOSIS — C64.9 RENAL CELL CARCINOMA, UNSPECIFIED LATERALITY: ICD-10-CM

## 2023-01-17 PROCEDURE — 74176 CT ABD & PELVIS W/O CONTRAST: CPT

## 2023-01-17 PROCEDURE — 74176 CT ABD & PELVIS W/O CONTRAST: CPT | Performed by: RADIOLOGY

## 2023-02-15 DIAGNOSIS — R52 PAIN: ICD-10-CM

## 2023-02-15 DIAGNOSIS — C64.9 RENAL CELL CARCINOMA, UNSPECIFIED LATERALITY: ICD-10-CM

## 2023-02-15 DIAGNOSIS — C64.9 CARCINOMA OF KIDNEY, UNSPECIFIED LATERALITY: ICD-10-CM

## 2023-02-15 RX ORDER — OXYCODONE AND ACETAMINOPHEN 7.5; 325 MG/1; MG/1
1 TABLET ORAL EVERY 6 HOURS PRN
Qty: 120 TABLET | Refills: 0 | Status: SHIPPED | OUTPATIENT
Start: 2023-02-15 | End: 2023-03-22 | Stop reason: SDUPTHER

## 2023-02-15 NOTE — TELEPHONE ENCOUNTER
Caller: Annemarie Reynolds    Relationship: Emergency Contact    Best call back number: [phone]    Requested Prescriptions:   Requested Prescriptions     Pending Prescriptions Disp Refills   • oxyCODONE-acetaminophen (PERCOCET) 7.5-325 MG per tablet 120 tablet 0     Sig: Take 1 tablet by mouth Every 6 (Six) Hours As Needed for Moderate Pain.        Pharmacy where request should be sent: [facility]     Additional details provided by patient: N/A    Does the patient have less than a 3 day supply:  [x] Yes  [] No    Would you like a call back once the refill request has been completed: [x] Yes [] No    If the office needs to give you a call back, can they leave a voicemail: [x] Yes [] No    Valente Patel Rep   02/15/23 14:55 EST

## 2023-03-22 DIAGNOSIS — C64.9 CARCINOMA OF KIDNEY, UNSPECIFIED LATERALITY: ICD-10-CM

## 2023-03-22 DIAGNOSIS — R52 PAIN: ICD-10-CM

## 2023-03-22 DIAGNOSIS — C64.9 RENAL CELL CARCINOMA, UNSPECIFIED LATERALITY: ICD-10-CM

## 2023-03-22 RX ORDER — OXYCODONE AND ACETAMINOPHEN 7.5; 325 MG/1; MG/1
1 TABLET ORAL EVERY 6 HOURS PRN
Qty: 120 TABLET | Refills: 0 | Status: SHIPPED | OUTPATIENT
Start: 2023-03-22

## 2023-03-22 NOTE — TELEPHONE ENCOUNTER
Caller: Annemarie Reynolds    Relationship: Emergency Contact    Best call back number: [phone]    Requested Prescriptions:   Requested Prescriptions     Pending Prescriptions Disp Refills   • oxyCODONE-acetaminophen (PERCOCET) 7.5-325 MG per tablet 120 tablet 0     Sig: Take 1 tablet by mouth Every 6 (Six) Hours As Needed for Moderate Pain.        Pharmacy where request should be sent:      [facility] [place], [place] - [address]  - [phone] [facility] [phone]   [phone]    Last office visit with prescribing clinician: Visit date not found   Last telemedicine visit with prescribing clinician: 6/20/2023   Next office visit with prescribing clinician: 6/20/2023     Additional details provided by patient:     WILL RUN OUT TOMORROW MORNING     Does the patient have less than a 3 day supply:  [x] Yes  [] No    Would you like a call back once the refill request has been completed: [] Yes [x] No    If the office needs to give you a call back, can they leave a voicemail: [] Yes [x] No        ”

## 2023-04-24 DIAGNOSIS — C64.9 RENAL CELL CARCINOMA, UNSPECIFIED LATERALITY: ICD-10-CM

## 2023-04-24 DIAGNOSIS — C64.9 CARCINOMA OF KIDNEY, UNSPECIFIED LATERALITY: ICD-10-CM

## 2023-04-24 DIAGNOSIS — R52 PAIN: ICD-10-CM

## 2023-04-24 NOTE — TELEPHONE ENCOUNTER
Caller: Rodolfo,Tia    Relationship: Emergency Contact    Best call back number: [phone]    Requested Prescriptions:   Requested Prescriptions     Pending Prescriptions Disp Refills   • oxyCODONE-acetaminophen (PERCOCET) 7.5-325 MG per tablet 120 tablet 0     Sig: Take 1 tablet by mouth Every 6 (Six) Hours As Needed for Moderate Pain.        Pharmacy where request should be sent: [facility] [place], [place] - [address]  - [phone] [facility] [phone] FX     Last office visit with prescribing clinician: Visit date not found   Last telemedicine visit with prescribing clinician: 6/20/2023   Next office visit with prescribing clinician: 6/20/2023       Does the patient have less than a 3 day supply:  [x] Yes  [] No    Valente Hicks Rep   04/24/23 16:29 EDT

## 2023-04-25 RX ORDER — OXYCODONE AND ACETAMINOPHEN 7.5; 325 MG/1; MG/1
1 TABLET ORAL EVERY 6 HOURS PRN
Qty: 120 TABLET | Refills: 0 | Status: SHIPPED | OUTPATIENT
Start: 2023-04-25

## 2023-05-24 ENCOUNTER — TELEPHONE (OUTPATIENT)
Dept: ONCOLOGY | Facility: CLINIC | Age: 73
End: 2023-05-24
Payer: MEDICARE

## 2023-05-24 DIAGNOSIS — C64.9 RENAL CELL CARCINOMA, UNSPECIFIED LATERALITY: ICD-10-CM

## 2023-05-24 DIAGNOSIS — R52 PAIN: ICD-10-CM

## 2023-05-24 DIAGNOSIS — C64.9 CARCINOMA OF KIDNEY, UNSPECIFIED LATERALITY: ICD-10-CM

## 2023-05-24 RX ORDER — OXYCODONE AND ACETAMINOPHEN 7.5; 325 MG/1; MG/1
1 TABLET ORAL EVERY 6 HOURS PRN
Qty: 120 TABLET | Refills: 0 | Status: SHIPPED | OUTPATIENT
Start: 2023-05-24

## 2023-05-24 NOTE — TELEPHONE ENCOUNTER
Caller: Annemarie Reynolds    Relationship: Emergency Contact    Best call back number: [phone]    Requested Prescriptions:   Requested Prescriptions     Pending Prescriptions Disp Refills   • oxyCODONE-acetaminophen (PERCOCET) 7.5-325 MG per tablet 120 tablet 0     Sig: Take 1 tablet by mouth Every 6 (Six) Hours As Needed for Moderate Pain.        Pharmacy where request should be sent: [facility] [place], [place] - [address] [facility] [phone] [facility] [phone] FX     Last office visit with prescribing clinician: Visit date not found   Last telemedicine visit with prescribing clinician: Visit date not found   Next office visit with prescribing clinician: 6/20/2023       Does the patient have less than a 3 day supply:  [x] Yes  [] No    Would you like a call back once the refill request has been completed: [] Yes [x] No    If the office needs to give you a call back, can they leave a voicemail: [] Yes [x] No    Vaelnte Kaplan Rep   05/24/23 15:12 EDT

## 2023-07-31 DIAGNOSIS — C64.9 RENAL CELL CARCINOMA, UNSPECIFIED LATERALITY: ICD-10-CM

## 2023-07-31 DIAGNOSIS — R52 PAIN: ICD-10-CM

## 2023-07-31 DIAGNOSIS — C64.9 CARCINOMA OF KIDNEY, UNSPECIFIED LATERALITY: ICD-10-CM

## 2023-07-31 RX ORDER — OXYCODONE AND ACETAMINOPHEN 7.5; 325 MG/1; MG/1
1 TABLET ORAL EVERY 6 HOURS PRN
Qty: 120 TABLET | Refills: 0 | Status: SHIPPED | OUTPATIENT
Start: 2023-07-31

## 2023-08-23 DIAGNOSIS — C64.9 CARCINOMA OF KIDNEY, UNSPECIFIED LATERALITY: ICD-10-CM

## 2023-08-23 DIAGNOSIS — C64.9 RENAL CELL CARCINOMA, UNSPECIFIED LATERALITY: ICD-10-CM

## 2023-08-23 DIAGNOSIS — R52 PAIN: ICD-10-CM

## 2023-08-23 RX ORDER — OXYCODONE AND ACETAMINOPHEN 7.5; 325 MG/1; MG/1
1 TABLET ORAL EVERY 6 HOURS PRN
Qty: 120 TABLET | Refills: 0 | OUTPATIENT
Start: 2023-08-23

## 2023-08-23 NOTE — TELEPHONE ENCOUNTER
Caller: RodolfoAnnemarie    Relationship: Emergency Contact    Best call back number: [phone]    Requested Prescriptions:   Requested Prescriptions     Pending Prescriptions Disp Refills    oxyCODONE-acetaminophen (PERCOCET) 7.5-325 MG per tablet 120 tablet 0     Sig: Take 1 tablet by mouth Every 6 (Six) Hours As Needed for Moderate Pain.        Pharmacy where request should be sent: [facility] [place], [place] - [address] [facility] [phone] [facility] [phone] FX     Last office visit with prescribing clinician: 6/20/2023   Last telemedicine visit with prescribing clinician: Visit date not found   Next office visit with prescribing clinician: 12/20/2023     Additional details provided by patient: SHE IS COMPLETELY OUT.    Does the patient have less than a 3 day supply:  [x] Yes  [] No    Would you like a call back once the refill request has been completed: [] Yes [x] No    If the office needs to give you a call back, can they leave a voicemail: [] Yes [x] No    Valente Porter Rep   08/23/23 14:33 EDT

## 2023-08-28 DIAGNOSIS — R52 PAIN: ICD-10-CM

## 2023-08-28 DIAGNOSIS — C64.9 CARCINOMA OF KIDNEY, UNSPECIFIED LATERALITY: ICD-10-CM

## 2023-08-28 DIAGNOSIS — C64.9 RENAL CELL CARCINOMA, UNSPECIFIED LATERALITY: ICD-10-CM

## 2023-08-28 RX ORDER — OXYCODONE AND ACETAMINOPHEN 7.5; 325 MG/1; MG/1
1 TABLET ORAL EVERY 6 HOURS PRN
Qty: 120 TABLET | Refills: 0 | Status: SHIPPED | OUTPATIENT
Start: 2023-08-28

## 2023-08-28 NOTE — TELEPHONE ENCOUNTER
TIA IS CALLING STATES THAT CARLEE IS NEEDING A REFILL ON HER MEDICATION.    PLEASE ADVISE WHY IT HAS NOT BEEN APPROVED

## 2023-09-26 DIAGNOSIS — C64.9 CARCINOMA OF KIDNEY, UNSPECIFIED LATERALITY: ICD-10-CM

## 2023-09-26 DIAGNOSIS — R52 PAIN: ICD-10-CM

## 2023-09-26 DIAGNOSIS — C64.9 RENAL CELL CARCINOMA, UNSPECIFIED LATERALITY: ICD-10-CM

## 2023-09-26 RX ORDER — OXYCODONE AND ACETAMINOPHEN 7.5; 325 MG/1; MG/1
1 TABLET ORAL EVERY 6 HOURS PRN
Qty: 120 TABLET | Refills: 0 | Status: SHIPPED | OUTPATIENT
Start: 2023-09-26

## 2023-09-26 NOTE — TELEPHONE ENCOUNTER
Caller: RodolfoAnnemarie    Relationship: Emergency Contact    Best call back number: [phone]    Requested Prescriptions:   Requested Prescriptions     Pending Prescriptions Disp Refills    oxyCODONE-acetaminophen (PERCOCET) 7.5-325 MG per tablet 120 tablet 0     Sig: Take 1 tablet by mouth Every 6 (Six) Hours As Needed for Moderate Pain.        Pharmacy where request should be sent: [facility] [place], [place] - [address]  - [phone] [facility] [phone] FX     Last office visit with prescribing clinician: 6/20/2023   Last telemedicine visit with prescribing clinician: Visit date not found   Next office visit with prescribing clinician: 12/20/2023     Does the patient have less than a 3 day supply:  [x] Yes  [] No    Would you like a call back once the refill request has been completed: [] Yes [x] No    If the office needs to give you a call back, can they leave a voicemail: [] Yes [x] No

## 2023-10-25 DIAGNOSIS — R52 PAIN: ICD-10-CM

## 2023-10-25 DIAGNOSIS — C64.9 CARCINOMA OF KIDNEY, UNSPECIFIED LATERALITY: ICD-10-CM

## 2023-10-25 DIAGNOSIS — C64.9 RENAL CELL CARCINOMA, UNSPECIFIED LATERALITY: ICD-10-CM

## 2023-10-25 RX ORDER — OXYCODONE AND ACETAMINOPHEN 7.5; 325 MG/1; MG/1
1 TABLET ORAL EVERY 6 HOURS PRN
Qty: 120 TABLET | Refills: 0 | Status: SHIPPED | OUTPATIENT
Start: 2023-10-25

## 2023-10-25 NOTE — TELEPHONE ENCOUNTER
Caller: Annemarie Reynolds    Relationship: Emergency Contact    Best call back number: [phone]    Requested Prescriptions:   Requested Prescriptions     Pending Prescriptions Disp Refills    oxyCODONE-acetaminophen (PERCOCET) 7.5-325 MG per tablet 120 tablet 0     Sig: Take 1 tablet by mouth Every 6 (Six) Hours As Needed for Moderate Pain.        Pharmacy where request should be sent: [facility] [place], [place] - [address] [facility] [phone] [facility] [phone] FX     Last office visit with prescribing clinician: 6/20/2023   Last telemedicine visit with prescribing clinician: Visit date not found   Next office visit with prescribing clinician: 12/20/2023     Additional details provided by patient: SHE HAS 3 PILLS REMAINING.    Does the patient have less than a 3 day supply:  [x] Yes  [] No    Would you like a call back once the refill request has been completed: [] Yes [x] No    If the office needs to give you a call back, can they leave a voicemail: [] Yes [x] No    Valente Porter Rep   10/25/23 13:54 EDT

## 2023-11-28 DIAGNOSIS — C64.9 CARCINOMA OF KIDNEY, UNSPECIFIED LATERALITY: ICD-10-CM

## 2023-11-28 DIAGNOSIS — C64.9 RENAL CELL CARCINOMA, UNSPECIFIED LATERALITY: ICD-10-CM

## 2023-11-28 DIAGNOSIS — R52 PAIN: ICD-10-CM

## 2023-11-28 RX ORDER — OXYCODONE AND ACETAMINOPHEN 7.5; 325 MG/1; MG/1
1 TABLET ORAL EVERY 6 HOURS PRN
Qty: 120 TABLET | Refills: 0 | Status: SHIPPED | OUTPATIENT
Start: 2023-11-28

## 2023-11-28 NOTE — TELEPHONE ENCOUNTER
Caller: RodolfoAnnemarie    Relationship: Emergency Contact    Best call back number: [phone]     Requested Prescriptions:   Requested Prescriptions     Pending Prescriptions Disp Refills    oxyCODONE-acetaminophen (PERCOCET) 7.5-325 MG per tablet 120 tablet 0     Sig: Take 1 tablet by mouth Every 6 (Six) Hours As Needed for Moderate Pain.        Pharmacy where request should be sent: [facility] [place], [place] - [address]  - [phone] [facility] [phone] FX     Last office visit with prescribing clinician: 6/20/2023   Last telemedicine visit with prescribing clinician: Visit date not found   Next office visit with prescribing clinician: 12/20/2023     Additional details provided by patient:     Does the patient have less than a 3 day supply:  [x] Yes  [] No    Would you like a call back once the refill request has been completed: [] Yes [x] No    If the office needs to give you a call back, can they leave a voicemail: [] Yes [x] No

## 2023-12-18 ENCOUNTER — HOSPITAL ENCOUNTER (OUTPATIENT)
Dept: RADIATION ONCOLOGY | Facility: HOSPITAL | Age: 73
Discharge: HOME | End: 2023-12-18
Payer: MEDICARE

## 2023-12-18 DIAGNOSIS — C64.9 CARCINOMA OF KIDNEY, UNSPECIFIED LATERALITY: ICD-10-CM

## 2023-12-18 DIAGNOSIS — C64.9 RENAL CELL CARCINOMA, UNSPECIFIED LATERALITY: ICD-10-CM

## 2023-12-18 PROCEDURE — 71250 CT THORAX DX C-: CPT

## 2023-12-18 PROCEDURE — 74176 CT ABD & PELVIS W/O CONTRAST: CPT

## 2023-12-20 ENCOUNTER — LAB (OUTPATIENT)
Dept: ONCOLOGY | Facility: CLINIC | Age: 73
End: 2023-12-20
Payer: MEDICARE

## 2023-12-20 ENCOUNTER — OFFICE VISIT (OUTPATIENT)
Dept: ONCOLOGY | Facility: CLINIC | Age: 73
End: 2023-12-20
Payer: MEDICARE

## 2023-12-20 VITALS
HEART RATE: 67 BPM | SYSTOLIC BLOOD PRESSURE: 110 MMHG | WEIGHT: 171 LBS | RESPIRATION RATE: 18 BRPM | BODY MASS INDEX: 28.46 KG/M2 | TEMPERATURE: 97.3 F | OXYGEN SATURATION: 94 % | DIASTOLIC BLOOD PRESSURE: 78 MMHG

## 2023-12-20 DIAGNOSIS — C64.9 CARCINOMA OF KIDNEY, UNSPECIFIED LATERALITY: Primary | ICD-10-CM

## 2023-12-20 DIAGNOSIS — R91.8 PULMONARY NODULES: ICD-10-CM

## 2023-12-20 DIAGNOSIS — R91.8 PULMONARY NODULES: Primary | ICD-10-CM

## 2023-12-20 DIAGNOSIS — C64.9 RENAL CELL CARCINOMA, UNSPECIFIED LATERALITY: ICD-10-CM

## 2023-12-20 DIAGNOSIS — C64.9 CARCINOMA OF KIDNEY, UNSPECIFIED LATERALITY: ICD-10-CM

## 2023-12-20 LAB
ALBUMIN SERPL-MCNC: 4 G/DL (ref 3.5–5.2)
ALBUMIN/GLOB SERPL: 0.9 G/DL
ALP SERPL-CCNC: 198 U/L (ref 39–117)
ALT SERPL W P-5'-P-CCNC: 61 U/L (ref 1–33)
ANION GAP SERPL CALCULATED.3IONS-SCNC: 10.8 MMOL/L (ref 5–15)
AST SERPL-CCNC: 88 U/L (ref 1–32)
BASOPHILS # BLD AUTO: 0.05 10*3/MM3 (ref 0–0.2)
BASOPHILS NFR BLD AUTO: 0.8 % (ref 0–1.5)
BILIRUB SERPL-MCNC: 0.6 MG/DL (ref 0–1.2)
BUN SERPL-MCNC: 28 MG/DL (ref 8–23)
BUN/CREAT SERPL: 17 (ref 7–25)
CALCIUM SPEC-SCNC: 9.2 MG/DL (ref 8.6–10.5)
CHLORIDE SERPL-SCNC: 105 MMOL/L (ref 98–107)
CO2 SERPL-SCNC: 18.2 MMOL/L (ref 22–29)
CREAT SERPL-MCNC: 1.65 MG/DL (ref 0.57–1)
DEPRECATED RDW RBC AUTO: 47.9 FL (ref 37–54)
EGFRCR SERPLBLD CKD-EPI 2021: 32.9 ML/MIN/1.73
EOSINOPHIL # BLD AUTO: 0.1 10*3/MM3 (ref 0–0.4)
EOSINOPHIL NFR BLD AUTO: 1.7 % (ref 0.3–6.2)
ERYTHROCYTE [DISTWIDTH] IN BLOOD BY AUTOMATED COUNT: 12.6 % (ref 12.3–15.4)
GLOBULIN UR ELPH-MCNC: 4.7 GM/DL
GLUCOSE SERPL-MCNC: 111 MG/DL (ref 65–99)
HCT VFR BLD AUTO: 46.1 % (ref 34–46.6)
HGB BLD-MCNC: 13.9 G/DL (ref 12–15.9)
IMM GRANULOCYTES # BLD AUTO: 0.01 10*3/MM3 (ref 0–0.05)
IMM GRANULOCYTES NFR BLD AUTO: 0.2 % (ref 0–0.5)
LYMPHOCYTES # BLD AUTO: 1.96 10*3/MM3 (ref 0.7–3.1)
LYMPHOCYTES NFR BLD AUTO: 32.9 % (ref 19.6–45.3)
MCH RBC QN AUTO: 31.1 PG (ref 26.6–33)
MCHC RBC AUTO-ENTMCNC: 30.2 G/DL (ref 31.5–35.7)
MCV RBC AUTO: 103.1 FL (ref 79–97)
MONOCYTES # BLD AUTO: 0.51 10*3/MM3 (ref 0.1–0.9)
MONOCYTES NFR BLD AUTO: 8.6 % (ref 5–12)
NEUTROPHILS NFR BLD AUTO: 3.33 10*3/MM3 (ref 1.7–7)
NEUTROPHILS NFR BLD AUTO: 55.8 % (ref 42.7–76)
NRBC BLD AUTO-RTO: 0 /100 WBC (ref 0–0.2)
PLATELET # BLD AUTO: 246 10*3/MM3 (ref 140–450)
PMV BLD AUTO: 10.5 FL (ref 6–12)
POTASSIUM SERPL-SCNC: 6.1 MMOL/L (ref 3.5–5.2)
PROT SERPL-MCNC: 8.7 G/DL (ref 6–8.5)
RBC # BLD AUTO: 4.47 10*6/MM3 (ref 3.77–5.28)
SODIUM SERPL-SCNC: 134 MMOL/L (ref 136–145)
WBC NRBC COR # BLD AUTO: 5.96 10*3/MM3 (ref 3.4–10.8)

## 2023-12-20 PROCEDURE — 85025 COMPLETE CBC W/AUTO DIFF WBC: CPT | Performed by: INTERNAL MEDICINE

## 2023-12-20 PROCEDURE — 3074F SYST BP LT 130 MM HG: CPT | Performed by: INTERNAL MEDICINE

## 2023-12-20 PROCEDURE — 99214 OFFICE O/P EST MOD 30 MIN: CPT | Performed by: INTERNAL MEDICINE

## 2023-12-20 PROCEDURE — 1126F AMNT PAIN NOTED NONE PRSNT: CPT | Performed by: INTERNAL MEDICINE

## 2023-12-20 PROCEDURE — 3078F DIAST BP <80 MM HG: CPT | Performed by: INTERNAL MEDICINE

## 2023-12-20 PROCEDURE — 80053 COMPREHEN METABOLIC PANEL: CPT | Performed by: INTERNAL MEDICINE

## 2023-12-20 RX ORDER — OXYCODONE AND ACETAMINOPHEN 10; 325 MG/1; MG/1
1 TABLET ORAL EVERY 6 HOURS PRN
Qty: 120 TABLET | Refills: 0 | Status: SHIPPED | OUTPATIENT
Start: 2023-12-20

## 2023-12-20 NOTE — PROGRESS NOTES
Venipuncture Blood Specimen Collection  Venipuncture performed in right hand by Palmira Trevino MA with good hemostasis. Patient tolerated the procedure well without complications.   12/20/23   Palmira Trevino MA

## 2023-12-20 NOTE — PROGRESS NOTES
"  Name:  Nancy Berman  :  1950  Date:  2023     REFERRING PROVIDER  ANGELA Clark    PRIMARY CARE PROVIDER  Julee Lerner APRN    REASON FOR FOLLOWUP  1. Carcinoma of kidney, unspecified laterality    2. Pulmonary nodules      CHIEF COMPLAINT  Slightly increased back pain.    Dear Ms. Eduarda,    HISTORY OF PRESENT ILLNESS:   I saw Ms. Berman in follow up today in our medical oncology clinic. As you are aware, she is a pleasant, 73 y.o.,  female with a history of hypertension, diabetes and chronic kidney disease who was initially diagnosed with, and underwent an uncomplicated resection of, a left-sided kidney cancer in . Adjuvant therapy was not indicated. Ultimately, she had been doing very well from the standpoint of this disease ever since then (for the past ten years). However, in early summer 2018, as part of a routine follow up evaluation for her chronic kidney disease (of her remaining, right kidney), you ordered a renal ultrasound. Unexpectedly, this study identified a retroperitoneal mass; and subsequent CT scans (performed in late 2018) showed \"nodular foci in the retroperitoneum\", a \"heterogeneous lesion inferior in the right lobe of the liver\" and \"small, tiny peripheral densities in the lungs most noticeable on the right\", consistent with more widespread and metastatic disease. She underwent a CT-guided biopsy of a retroperitoneal mass at  on 2018, and the pathology results were consistent with recurrent, renal cell carcinoma. She was subsequently referred to our clinic for further evaluation and management.    INTERIM HISTORY:  Ms. Berman returns to clinic today for follow up once again accompanied by her daughter. She has a history of longstanding back pain (which, for years, has had a tendency to worsen when she stands up to wash dishes and forces her to sit down periodically to rest) and fatigue. This symptom has recently " not been under quite as good control lately with the prn Percocet we have been prescribing to her. She underwent resection of her recurrent, retroperitoneal mass by  urology on 07/22/2019. This procedure went very well, and she has continued to do very well from the standpoint of her malignancy ever since then (for going on four and one half years now). She overall otherwise continues to feel the same as she usually does, and she once again has no new or other specific complaints.    Past Medical History:   Diagnosis Date    Arthritis     Cancer     left Kidney    Chronic kidney disease, stage III (moderate)     Diabetes mellitus     High cholesterol     Hypertension     Low back pain     Spinal stenosis        Past Surgical History:   Procedure Laterality Date    BLADDER SURGERY      HYSTERECTOMY      KIDNEY SURGERY Left     Nephrectomy for cancerous mass    DC TX HUMRAL SHAFT FX W/INSJ IMED IMPLT W/W CERCLGE Left 4/20/2017    Procedure: LEFT HUMERUS INTRAMEDULLARY NAIL/BHAVANA INSERTION;  Surgeon: Jose Willard MD;  Location: [facility];  Service: Orthopedics       Social History     Socioeconomic History    Marital status: Legally    Tobacco Use    Smoking status: Never    Smokeless tobacco: Never   Substance and Sexual Activity    Alcohol use: Yes     Comment: social    Drug use: Defer    Sexual activity: Defer       Family History   Problem Relation Age of Onset    Parkinsonism Mother     Heart disease Father     Diabetes Father     Cancer Brother        Allergies   Allergen Reactions    Sulfa Antibiotics        Current Outpatient Medications   Medication Sig Dispense Refill    amLODIPine (NORVASC) 5 MG tablet Take 1 tablet by mouth Daily.      aspirin 325 MG tablet Take 1 tablet by mouth Daily.      atenolol (TENORMIN) 50 MG tablet       carbidopa-levodopa (SINEMET)  MG per tablet Take 1 tablet by mouth 3 (Three) Times a Day.      gabapentin (NEURONTIN) 600 MG tablet Take 1 tablet by mouth  3 (Three) Times a Day.      metFORMIN XR (GLUCOPHAGE-XR) 500 MG 24 hr tablet Take 1 tablet by mouth Daily With Breakfast.      oxyCODONE-acetaminophen (PERCOCET) 7.5-325 MG per tablet Take 1 tablet by mouth Every 6 (Six) Hours As Needed for Moderate Pain. 120 tablet 0    QUEtiapine (SEROquel) 25 MG tablet Take 1 tablet by mouth Every Night.       No current facility-administered medications for this visit.     REVIEW OF SYSTEMS  CONSTITUTIONAL:  No fever, chills or night sweats. Chronic fatigue, stable.  EYES:  No blurry vision, diplopia or other vision changes.  ENT:  No hearing loss, nosebleeds or sore throat.  CARDIOVASCULAR:  No palpitations, arrhythmia, syncopal episodes or edema.  PULMONARY:  No hemoptysis, wheezing, chronic cough or shortness of breath.  GASTROINTESTINAL:  No nausea or vomiting.  No constipation or diarrhea. No abdominal pain.  GENITOURINARY:  No hematuria or kidney stones. Recent burning with urination, for which she is starting an antibiotic today, as per the HPI above.  MUSCULOSKELETAL:  As per the HPI above.  INTEGUMENTARY: No rashes or pruritus.  ENDOCRINE:  No excessive thirst or hot flashes.  HEMATOLOGIC:  No history of free bleeding, spontaneous bleeding or clotting.  IMMUNOLOGIC:  No allergies or frequent infections.  NEUROLOGIC: No numbness, tingling, seizures or weakness.  PSYCHIATRIC:  Mild chronic anxiety, stable.    PHYSICAL EXAMINATION  /78   Pulse 67   Temp 97.3 °F (36.3 °C) (Temporal)   Resp 18   Wt 77.6 kg (171 lb)   SpO2 94%   BMI 28.46 kg/m²     ECOG score: 0   GENERAL:  A well-developed, well-nourished, elderly, black female in no acute distress, once again sitting in a wheelchair.  HEENT:  Pupils equally round and reactive to light. Extraocular muscles intact.  CARDIOVASCULAR:  Regular rate and rhythm. No murmurs, gallops or rubs.  LUNGS:  Clear to auscultation bilaterally.  ABDOMEN:  Soft, nontender, nondistended with positive bowel sounds.  EXTREMITIES:  No  clubbing, cyanosis or edema bilaterally.  SKIN:  No rashes or petechiae.  NEURO:  Cranial nerves grossly intact. No focal deficits. Mild, resting tremors, stable.  PSYCH:  Alert and oriented x3.    LABORATORY  Lab Results   Component Value Date    WBC 5.96 12/20/2023    HGB 13.9 12/20/2023    HCT 46.1 12/20/2023    .1 (H) 12/20/2023     12/20/2023    NEUTROABS 3.33 12/20/2023       Lab Results   Component Value Date     (L) 06/20/2023    K 5.2 06/20/2023     06/20/2023    CO2 18.8 (L) 06/20/2023    BUN 30 (H) 06/20/2023    CREATININE 1.76 (H) 06/20/2023    GLUCOSE 98 06/20/2023    CALCIUM 9.2 06/20/2023    AST 34 (H) 06/20/2023    ALT 28 06/20/2023    ALKPHOS 160 (H) 06/20/2023    BILITOT 0.3 06/20/2023    PROTEINTOT 8.3 06/20/2023    ALBUMIN 4.2 06/20/2023     CBC (12/20/2023): WBCs: 5.96; HgB: 13.9; Hct: 46.1; platelets: 246  CBC (06/20/2023): WBCs: 8.89; HgB: 12.2; Hct: 39.1; platelets: 275  CBC (12/20/2022): WBCs: 7.0; HgB: 12.5; Hct: 43.5; platelets: 293  CBC (03/05/2021): WBCs: 4.4; HgB: 13.8; Hct: 43.8; platelets: 263  CBC (12/11/2020): WBCs: 7.2; HgB: 13.2; Hct: 41.0; platelets: 290  CBC (05/21/2020): WBCs: 6.9; HgB: 14.2; Hct: 44.3; platelets: 282  CBC (10/22/2019): WBCs: 6.1; HgB: 12.8; Hct: 39.1; platelets: 318  CBC (06/14/2019): WBCs: 4.7; HgB: 12.7; Hct: 38.7; platelets: 252  CBC (03/05/2019): WBCs: 5.9; HgB: 13.6; Hct: 41.4; platelets: 264  CBC (11/20/2018): WBCs: 5.3; HgB: 13.0; Hct: 40.4; platelets: 243  CBC (08/23/2018): WBCs: 5.7; HgB: 13.8; Hct: 43.4; platelets: 248    IMAGING  CT chest, abdomen and pelvis with contrast (06/30/2018, at ):  Impression:  1) Small, tiny peripheral densities in the lungs most noticeable on the right.  2) Heterogeneous lesion inferior in the right lobe of the liver.  3) Heterogeneous enhancing nodular foci in the retroperitoneum.  4) Vascular calcifications with moderate coronary artery disease.  5) Moderate degenerative changes in the spine  with moderate spinal canal stenosis.    CT chest without contrast (08/16/2018):  Impression: Negative CT scan of the thorax. Nothing is seen to suggest metastatic disease in the chest.    CT abdomen and pelvis without contrast (08/16/2018):  Impression: Patient status post right nephrectomy. There is a low density mass in the retroperitoneum that would be consistent with metastatic disease to the retroperitoneal lymph nodes possibly involving the inferior vena cava. It measures approximately 3.8 cm in diameter.    NM bone scan, whole body (09/27/2018):  Impression: There are no findings to suggest metastatic disease or to explain the patient's symptoms.    CT chest without contrast (11/20/2018):  Impression: Stable CT scan of the thorax. Nothing seen to suggest metastatic disease. There is fairly heavy coronary artery calcification. There were no pulmonary parenchymal lung nodules or pleural effusions. No enlarged lymph nodes were demonstrated.    CT abdomen and pelvis without contrast (11/20/2018):  Impression:  Patient status post right nephrectomy. There continues to be some abnormal soft tissue density in the retroperitoneum in the pericaval area concerning for retroperitoneal lymphadenopathy. This is completely unchanged, however, from 08/2018 exam.    CT abdomen and pelvis without contrast (03/05/2019):  Impression: Stable appearance of the abdomen including soft tissue density mass in the retroperitoneal region. There continues to be some abnormal nearly 7 cm soft tissue density in the retroperitoneum in the pericaval area that remains stable.    CT chest, abdomen and pelvis with contrast (10/30/2019):  Impression: A noncalcified nodule right lower lobe on image 54 series 3 measures 7 x 6 mm and appears slightly larger, previously measuring 6 x 4 mm. No new nodules. Interval resection of retroperitoneal maxwell masses. Stable, lobulated mass in the lower right hepatic lobe.    CT chest without contrast  (05/20/2020):  Impression:  1) No parenchymal soft tissue nodules or masses.  2) No pericardial or pleural effusions.  3) No adenopathy.    CT chest, abdomen and pelvis with contrast (08/05/2020, compared to 10/30/2019, at ):  Impression: No evidence of disease progression with unchanged 8 mm noncalcified right lower lobe pulmonary nodule. Unchanged, lobulated mass in the right hemiliver. No evidence of disease progression.    NM bone scan, whole body (12/30/2020):  Impression: No evidence of metastatic disease to the skeleton.    CT chest with contrast (02/26/2021):  Impression: At this time there are no CT findings to suggest metastatic disease in the chest from the patient's renal cell carcinoma.    CT abdomen and pelvis with contrast (02/26/2021, compared to 02/22/2019):  Impression: The patient has developed an area of abnormal density in the right lobe of the liver not seen on the previous CTs. It measures 28 x 12 mm. [Upon further review with radiology, while this area was not seen on the previous, noncontrasted CTs of the abdomen, it is visible (and stable) on an available MRI from back in 2009.] The soft tissue abnormality in the retroperitoneum near the level of the renal vein is improved in comparing with earlier CTs. The right kidney is absent consistent with previous nephrectomy.    CT chest, abdomen and pelvis with contrast  (01/19/2022):  Impression:  1) Stable right lower lobe 8 mm pulmonary nodule unchanged dating back to 2019. No new pulmonary nodule or evidence of metastatic disease of the chest.  2) Stable appearance of lobulated right hemiliver lesion without interval change or evidence for progression in the abdomen and pelvis. Stable appearance of the right nephrectomy site.      CT abdomen and pelvis (01/17/2023, compared to 02/26/2021):  Impression:  1) Development of small pulmonary nodule in the right lower lobe and increased size of a pre-existing pulmonary nodule in the right lower  lobe (now measures 10.9 mm) suspicious for metastatic disease.  2) No change in size of appearance of a hypodense right lobe liver lesion. No additional solid organ lesions identified.  3) No abdominal or pelvic adenopathy.  4) Right nephrectomy. Other nonacute/incidental findings.    CT chest without contrast (12/18/2023, compared to 02/26/2021):  Impression:  1) Size increase of previously described nodules of the right lower lobe and possible development of new tiny nodules in the right lung. 4.6 mm nodule right lower lobe, image #34. 14.0 x 14.1 mm nodule right lower lobe, image #43, was previously 10.9 x 12.7 mm indicating size increase. Tiny nodule in the more posterior basal segment of the right lower lobe is 3.3 mm and may be new or size increased from previous. 4.4 mm nodule right lower lobe was previously 3.4 mm and may be slightly increased in size. New pulmonary nodule in the right middle lobe is 4.9 mm.  2) No thoracic adenopathy.  3) Cardiomegaly and severe coronary artery calcifications.    CT abdomen and pelvis without contrast (12/18/2023, compared to 01/17/2023):  Impression:  1) Stable hypodense lesion right lobe liver which is poorly defined.  2) No new solid organ lesions identified.  3) Stable changes of previous right nephrectomy.  4) No abdominal or pelvic adenopathy.  5) Refer to chest CT for pulmonary findings.  6) Advanced degenerative changes lumbar spine with multilevel stenosis. Other incidental and nonacute findings are stable from previous.    PATHOLOGY  Right kidney, radical nephrectomy (11/05/2008, per pathology report; outside slides):  Renal cell carcinoma, clear cell type. Unremarkable adrenal gland.    Right retroperitoneal mass (07/12/2018):  Positive for malignancy; consistent with recurrent renal cell carcinoma. PAX8 is strongly positive in tumor cells, and Inhibin is negative.    Retroperitoneal mass, resection (07/22/2019):  Metastatic renal cell carcinoma.    IMPRESSION  AND PLAN  Ms. Berman is a 73 y.o.,  female with:  Renal cell carcinoma: Initially diagnosed in 2008 and status post a left-sided nephrectomy, with no signs/symptoms of recurrent disease for the next ten years. Unfortunately, as of June 2018, this was/is no longer the case. CT scans performed at  on 06/30/2018 identified multiple foci in the retroperitoneum (along with possible lung lesions; although lung imaging since then has been less concerning), consistent with (and CT-guided FNA confirmed) recurrent, and now metastatic, disease. I have had multiple, long discussions with the patient and her daughters since the time of her initial presentation in our clinic (on 07/25/2018) regarding this diagnosis and, in general terms, its prognosis. In short, they remain aware that this disease is likely no longer curable; but that, particularly given her good performance status, it was (and remains) treatable. While there have always been multiple, systemic options available to us (including PO TKIs, such as pazopanib, and IV immunotherapy, such as qmonthly nivolumab), it has continued to be reasonable to defer starting any specific, systemic treatment to date. Renal cell carcinoma is typically slow growing, and this has been especially true in her case. She did not relapse for ten years, the CT scans performed in late June 2018 at  showed a low, tumor burden (which, at worst, has remained the case even since then); and she was (and remains) minimally (other than issue #3) symptomatic (and issue #3 likely has more to do with arthritis/degenerative disc disease than her cancer). In general, the longer an elderly patient with multiple comobidities can avoid the potential side effects of palliative treatment, the better. Given her continued, very stable, oligometastatic disease, by early 2019 it seemed worthwhile to send her back to urology for reevaluation to consider resecting the retroperitoneal, soft  tissue mass. This referral was made in Spring 2019, and following an evaluation at  (which included an MRI to make sure the mass did not appear to involve the great vessels),  urology ultimately took her to the OR on 07/22/2019. This procedure went extremely well. Today, nearly four and one half (4.5) years later, she remains in an overall very good partial remission (see issue #2, below). The most recent repeat CT scans of the chest, abdomen and pelvis (performed on 12/18/2023 and summarized above) continues to show the extremely slow, but steady progression of issue #2 (again, see below) but otherwise remains unremarkable . She will continue to follow up with  urology on an as needed basis. We will further address issue #2, and we will see her back in our clinic in four months (~mid-April 2024) with a CBC, CMP and repeat CT of the chest without contrast.  Pulmonary nodules: While otherwise unremarkable, the most recent repeat CT scans (performed on 12/18/2023 and summarized above) showed, for the first time in years, some signs of significant disease progression (within the lungs). While all of them are not necessarily malignant, the very slow, but steady increase in the largest nodule (which now measures ~1.4 cm in the right lower lobe), the probable development of at least a few, new nodules and their locations in the bilateral lower lungs are all suggestive of a late re-recurrence of issue #1. Biopsying one of these lesions would be ideal to confirm the exact etiology (a second, unrelated malignancy, particularly lung cancer, is certainly possible); however, with their distal locations and current sizes, at a minimum, a navigational bronchoscopy would likely be required to do so. Simply presuming that all/most of these are related to issue #1 and proceeding with palliative immunotherapy also still seems premature, as she remains asymptomatic from the standpoint of these relatively small nodules, and  immunotherapy is not necessarily side effect free (or guaranteed to work). Given all of this, following a long discussion today regarding the potential risks vs. benefits of such therapy, the patient and her daughter were agreeable to our referring her to [facility] radiation oncology for consideration of SRS to the largest nodule(s), followed by continued, routine monitoring of the others. This referral was placed today. We will see her back in our clinic in four months (~mid-April 2024) with a CBC, CMP and repeat chest CT.  Back pain: A chronic issue which the retroperitoneal foci from issue #1 was likely exacerbating; but which, now that the mass has been excised, is likely still primarily due to longstanding arthritis. As her neck pain has recently been a little worse, we further evaluated with a repeat NM bone scan; and this study, performed on 12/30/2020 (and summarized above) remained stable and completely unremarkable. With her diagnosis with issue #1, our clinic has agreed to provide her with her pain medicine. A Rx for (a slightly increased dose of) Percocet 10/325 mg q6hr prn was provided today. Continue to monitor.  The patient and her daughter were in agreement with these plans.    It is a pleasure to participate in Ms. Berman's care. Please do not hesitate to call with any questions or concerns that you may have.    A  total of 30 minutes were spent coordinating this patient’s care in clinic today; more than 50% of this time was face-to-face with the patient and her daughter, reviewing her interim medical history, discussing the results of this week's repeat CT scans and counseling on the current evaluation, treatment recommendations and followup plan. All questions were answered to their satisfaction.    FOLLOW UP  New Rx for Percocet 10 mg PO q6hr prn provided today. Referral placed to [facility] radiation oncology re: evaluate/treat with SRS to dominant, steadily enlarging, right lower lobe lung nodule. Return  to our clinic in 4 months (~mid-April 2024) with a CBC, CMP and repeat CT of the chest without contrast.            This document was electronically signed by GABY Graham MD December 20, 2023 10:41 EST      CC: ANGELA Clark MD Stephen E. Strup, MD

## 2024-01-23 ENCOUNTER — DOCUMENTATION (OUTPATIENT)
Dept: RADIATION ONCOLOGY | Facility: HOSPITAL | Age: 74
End: 2024-01-23
Payer: MEDICARE

## 2024-01-24 ENCOUNTER — HOSPITAL ENCOUNTER (OUTPATIENT)
Dept: RADIATION ONCOLOGY | Facility: HOSPITAL | Age: 74
Setting detail: RADIATION/ONCOLOGY SERIES
End: 2024-01-24
Payer: MEDICARE

## 2024-01-24 ENCOUNTER — HOSPITAL ENCOUNTER (OUTPATIENT)
Dept: RADIATION ONCOLOGY | Facility: HOSPITAL | Age: 74
Discharge: HOME | End: 2024-01-24

## 2024-01-24 ENCOUNTER — CONSULT (OUTPATIENT)
Dept: RADIATION ONCOLOGY | Facility: HOSPITAL | Age: 74
End: 2024-01-24
Payer: MEDICARE

## 2024-01-24 VITALS
OXYGEN SATURATION: 96 % | HEART RATE: 61 BPM | DIASTOLIC BLOOD PRESSURE: 75 MMHG | TEMPERATURE: 97.1 F | RESPIRATION RATE: 20 BRPM | SYSTOLIC BLOOD PRESSURE: 109 MMHG

## 2024-01-24 DIAGNOSIS — R91.8 PULMONARY NODULES: Primary | ICD-10-CM

## 2024-01-24 PROCEDURE — 77334 RADIATION TREATMENT AID(S): CPT | Performed by: RADIOLOGY

## 2024-01-24 PROCEDURE — G0463 HOSPITAL OUTPT CLINIC VISIT: HCPCS | Performed by: RADIOLOGY

## 2024-01-24 PROCEDURE — 77332 RADIATION TREATMENT AID(S): CPT | Performed by: RADIOLOGY

## 2024-01-24 NOTE — PROGRESS NOTES
New Patient Office Consult      Patient Name:  Nancy Berman  :                1950   MRN:                [id number]     Requesting Physician:   GABY Graham MD    Reason for Referral:  Right renal cancer status post right nephrectomy with enlarging right lower lobe metastases.  Review palliative radiotherapy options.    History of Present Illness:  Mrs.Minerva DONNA Berman is a pleasant 74 y.o.  lady whose past medical history is remarkable for  chronic kidney disease, hyperlipidemia, type 2 diabetes mellitus, dementia, and Alzheimer's disease.  Mrs. 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  The patient did not require postoperative radiotherapy or chemotherapy.    Mrs. Berman was followed by serial imaging studies.  Surveillance CT scans of the chest, abdomen, and pelvis (2018) revealed a 4 x 2 cm lesion in the right lobe of the liver, a 3 mm nodular density in the right upper lobe, and enhancing retroperitoneal masses measuring 5 cm posterior to the inferior vena cava, and 3 cm at the right side of the aorta.  CT-guided needle biopsies of the retroperitoneal mass) 2018) were positive for malignancy consistent with metastatic renal cell carcinoma.    The patient underwent resection of the retroperitoneal masses at the [facility] on 2019.    Mrs. Berman has been followed closely by SUSAN Graham MD.  The patient reported back and knee discomfort in mid .  Bone pain was felt to be secondary to arthritis.  CT scan of chest (2020) was negative.  Bone scan (2020) was negative for metastases.    CT scans of the chest, abdomen, and pelvis (2021) did not reveal any pulmonary parenchymal nodules or masses.  The liver lesion which is believed to be consistent with benign hemangioma was present.  The right kidney is  absent consistent with previous nephrectomy.    CT scans of the chest, abdomen and pelvis (01/09/2022 revealed a stable 8 mm nodule which was unchanged dating back to 2019.  The liver lesion appeared unchanged.  There was no evidence of tumor progression within the abdomen or pelvis.    CT scans of the chest, abdomen and pelvis (12/18/2023) revealed enlarging nodules within the (right) lung measuring from 3.3 mm to 14 x 14.1 mm in the right lower lobe.  A new 4.9 mm lesion was noted in the right middle lobe.  No new lesions were noted in the abdomen or pelvis.  The patient currently has no pulmonary symptomatology.    Mrs. Berman was seen in follow-up by the medical oncology service on 12/20/2023.  The right lung masses were felt to be consistent with metastases.  Initiation of palliative immunotherapy was deemed to be premature since the patient remains relatively asymptomatic.  Continued close follow-up was recommended.  The patient is referred to the radiation oncology service for consideration of stereotactic radiosurgery to the dominant right lower lobe nodule which now measures 1.4 cm in size.    Subjective      Review of Systems:   Constitutional: Negative for fever, chills, night sweats and nonintentional weight loss.  Positive for chronic fatigue  HEENT: Negative  Cardiovascular: Negative  Pulmonary: Negative  Gastrointestinal: Negative  Musculoskeletal: Positive for discomfort in lumbar spine and knees  Endocrine: Positive for type 2 diabetes mellitus  Neurologic: Positive for lower extremity numbness felt to be secondary to above  Integumentary: Negative  Lymphatic: Negative  Hematopoietic: Negative  Psychiatric: Positive for neurocognitive changes  felt to be secondary to Alzheimer's disease/dementia    Past Oncology History:   Oncology/Hematology History    No history exists.      Past Radiation History:  No previous exposures to ionizing radiation therapy and there are not relative contraindications  including connective tissue disorder.     Past Medical History:   Past Medical History:   Diagnosis Date    Arthritis     Cancer     left Kidney    Chronic kidney disease, stage III (moderate)     Diabetes mellitus     High cholesterol     Hypertension     Low back pain     Spinal stenosis        Past Surgical History:   Past Surgical History:   Procedure Laterality Date    BLADDER SURGERY      HYSTERECTOMY      KIDNEY SURGERY Left     Nephrectomy for cancerous mass    TX TX HUMRAL SHAFT FX W/INSJ IMED IMPLT W/W CERCLGE Left 4/20/2017    Procedure: LEFT HUMERUS INTRAMEDULLARY NAIL/BHAVANA INSERTION;  Surgeon: Jose Willard MD;  Location: [facility];  Service: Orthopedics       Family History:   Family History   Problem Relation Age of Onset    Parkinsonism Mother     Heart disease Father     Diabetes Father     Cancer Brother      Social History:   Social History     Socioeconomic History    Marital status: Legally    Tobacco Use    Smoking status: Never    Smokeless tobacco: Never   Substance and Sexual Activity    Alcohol use: Yes     Comment: social    Drug use: Defer    Sexual activity: Defer     The patient resides in [place].  Legally  from her spouse who was recently diagnosed with renal cancer.  Seven adult offspring - living and apparently healthy.  Education: GED certificate.  No history of  service.  The patient is currently retired.  Past occupation was certified nursing assistant.  Patient is a non-smoker and nondrinker.    Medications:     Current Outpatient Medications:     amLODIPine (NORVASC) 5 MG tablet, Take 1 tablet by mouth Daily., Disp: , Rfl:     aspirin 325 MG tablet, Take 1 tablet by mouth Daily., Disp: , Rfl:     atenolol (TENORMIN) 50 MG tablet, , Disp: , Rfl:     carbidopa-levodopa (SINEMET)  MG per tablet, Take 1 tablet by mouth 3 (Three) Times a Day., Disp: , Rfl:     gabapentin (NEURONTIN) 600 MG tablet, Take 1 tablet by mouth 3 (Three) Times a  Day., Disp: , Rfl:     metFORMIN XR (GLUCOPHAGE-XR) 500 MG 24 hr tablet, Take 1 tablet by mouth Daily With Breakfast., Disp: , Rfl:     oxyCODONE-acetaminophen (PERCOCET)  MG per tablet, Take 1 tablet by mouth Every 6 (Six) Hours As Needed for Moderate Pain., Disp: 120 tablet, Rfl: 0    QUEtiapine (SEROquel) 25 MG tablet, Take 1 tablet by mouth Every Night., Disp: , Rfl:     Allergies:   Allergies   Allergen Reactions    Sulfa Antibiotics        PHQ-9 Depression Screening  Little interest or pleasure in doing things?     Feeling down, depressed, or hopeless?     Trouble falling or staying asleep, or sleeping too much?     Feeling tired or having little energy?     Poor appetite or overeating?     Feeling bad about yourself - or that you are a failure or have let yourself or your family down?     Trouble concentrating on things, such as reading the newspaper or watching television?     Moving or speaking so slowly that other people could have noticed? Or the opposite - being so fidgety or restless that you have been moving around a lot more than usual?     Thoughts that you would be better off dead, or of hurting yourself in some way?     PHQ-9 Total Score     If you checked off any problems, how difficult have these problems made it for you to do your work, take care of things at home, or get along with other people?        Distress Screenin     KPS: 40 %     Imaging:  CT Chest Without Contrast Diagnostic    Result Date: 2023  1.  Size increase of previously described nodules of the right lower lobe and possible development of new tiny nodules in the right lung. 2.  No thoracic adenopathy. 3.  Cardiomegaly and severe coronary artery calcifications.   This report was finalized on 2023 11:03 AM by Dr. Bradley Hills MD.      CT Abdomen Pelvis Without Contrast    Result Date: 2023  1.  Stable hypodense lesion right lobe liver which is poorly defined. 2.  No new solid organ lesions  identified. 3.  Stable changes of previous right nephrectomy. 4.  No abdominal or pelvic adenopathy. 5.  Refer to chest CT for pulmonary findings. 6.  Advanced degenerative changes lumbar spine with multilevel stenosis. Other incidental and nonacute findings detailed above are stable from previous.   This report was finalized on 12/18/2023 10:59 AM by Dr. Bradley Hills MD.        Pathology:  Described above    Objective     Physical Exam:  Mrs. Berman is an elderly, frail appearing white female in no acute distress.  Vital signs as below.  KPS 40.    Vital signs: Blood pressure 109/75 mmHg, pulse 61, respirations 20, temperature 97.1 °F, pulse oximetry on room air 90%.  Weight not obtained due to patient instability while standing.    Head: Normocephalic, atraumatic.  Eyes PERRLA, EOMs intact.  Sclera and conjunctiva clear. Natural upper and lower dentition.  Neck: Short, supple no detectable cervical or periclavicular adenopathy.  No JVD or carotid bruits.  Trachea at midline.  Heart: Regular rate and rhythm no murmurs, rubs or gallops auscultated.  Lungs: Clear to auscultation bilaterally  Abdomen: Soft, nontender, no organomegaly.  Normal bowel sounds present.  Lymphatics: No detectable cervical, periclavicular, axillary or inguinal adenopathy  Extremities: Symmetric no cyanosis, clubbing or edema  Integumentary: No suspicious lesions noted on sun exposed skin.  No suspicious rashes or petechiae.  Neurologic: Cranial nerves appear intact.  Mild resting tremors noted.  The patient exhibits instability while standing.  She is ambulant with assistance.  Psychiatric: The patient appears disoriented and easily distracted (history of dementia and/or Alzheimer's disease).    Assessment / Plan      Assessment:  Renal carcinoma, status post right nephrectomy (2008), with peritoneal metastases (resected) and radiographic findings consistent with enlarging pulmonary metastases (C78.0)    Stage IV    Recommendations:  I  have reviewed Mrs. Berman's medical records and imaging studies.  The patient has been followed closely by the medical oncology service.  Unfortunately, the patient appears to have disease progression with in the lung.  The lung nodules are currently asymptomatic.  It is hoped to delay systemic therapy due to anticipated poor tolerance in this elderly lady with multiple comorbidities.  The patient appears to be a candidate for palliative radiotherapy using stereotactic body radiotherapy (SBRT) to the enlarging right lower lobe mass.  This mass currently measures 14 x 14.1 mm (previously 10.9 x 12.7 mm).  The goals of treatment are to reduce the size of this nodule and to prevent further growth which could possibly cause bleeding or other pulmonary compromise.The patient has other right middle and right lower lobe nodules which are all < 5 mm in size.    I explained to the patient and Annemarie Reynolds, the patient's daughter and guardian, that SBRT is a treatment modality that administers high doses of radiotherapy using multiple non-- coplanar treatment beams in a reduced number of fractions to a target site.  SBRT maximizes the dose delivery to the tumor and minimizes the radiation exposure of adjacent normal tissues.  The unique radiobiologic characteristics of focused high-dose radiotherapy have been shown to provide improved tumor control rates when compared to conventionally fractionated radiotherapy.  SBRT is now the treatment of choice for early stage non-small cell lung cancers and oligometastatic disease.    SBRT would consist in the administration of 5000 cGy administered in 5 treatment fractions on an alternate day basis.  I reviewed the rationale for this treatment, duration of therapy, expected outcomes, possible acute and chronic side effects, and posttreatment surveillance measures with the patient and her daughter.  The daughter wishes for her mother to proceed with radiotherapy.  Mrs. Berman and her  daughter understand that radiotherapy is not curative treatment.    Mrs. Berman underwent a treatment planning CT scan this morning.  The patient will be contacted with the treatment start date after radiotherapy planning has been completed.    Time spent with patient 60 minutes (the total time included review of medical records and imaging studies, obtaining an independent history of present illness, an appropriate medical examination/evaluation, patient counseling, and coordination of care).    Thank you for allowing our participation in this very pleasant lady's treatment.    cc:     MD Julee Laureano APRN Stephen J Dick, MD   01/24/24 16:19 EST

## 2024-01-26 DIAGNOSIS — C64.9 CARCINOMA OF KIDNEY, UNSPECIFIED LATERALITY: ICD-10-CM

## 2024-01-26 PROCEDURE — 77293 RESPIRATOR MOTION MGMT SIMUL: CPT | Performed by: RADIOLOGY

## 2024-01-26 PROCEDURE — 77300 RADIATION THERAPY DOSE PLAN: CPT | Performed by: RADIOLOGY

## 2024-01-26 PROCEDURE — 77338 DESIGN MLC DEVICE FOR IMRT: CPT | Performed by: RADIOLOGY

## 2024-01-26 PROCEDURE — 77301 RADIOTHERAPY DOSE PLAN IMRT: CPT | Performed by: RADIOLOGY

## 2024-01-26 RX ORDER — OXYCODONE AND ACETAMINOPHEN 10; 325 MG/1; MG/1
1 TABLET ORAL EVERY 6 HOURS PRN
Qty: 120 TABLET | Refills: 0 | Status: SHIPPED | OUTPATIENT
Start: 2024-01-26

## 2024-01-26 NOTE — TELEPHONE ENCOUNTER
Caller: RodolfoAnnemarie    Relationship: Emergency Contact    Best call back number: [phone]    Requested Prescriptions:   Requested Prescriptions     Pending Prescriptions Disp Refills    oxyCODONE-acetaminophen (PERCOCET)  MG per tablet 120 tablet 0     Sig: Take 1 tablet by mouth Every 6 (Six) Hours As Needed for Moderate Pain.        Pharmacy where request should be sent: [facility] [place], [place] - [address] - [phone] [facility] [phone] FX     Last office visit with prescribing clinician: 12/20/2023   Last telemedicine visit with prescribing clinician: Visit date not found   Next office visit with prescribing clinician: 4/19/2024       Does the patient have less than a 3 day supply:  [x] Yes  [] No      Valente Hicks Rep   01/26/24 14:47 EST

## 2024-01-29 ENCOUNTER — HOSPITAL ENCOUNTER (OUTPATIENT)
Dept: RADIATION ONCOLOGY | Facility: HOSPITAL | Age: 74
Discharge: HOME | End: 2024-01-29
Payer: MEDICARE

## 2024-01-29 LAB
RAD ONC ARIA COURSE ID: NORMAL
RAD ONC ARIA COURSE INTENT: NORMAL
RAD ONC ARIA COURSE LAST TREATMENT DATE: NORMAL
RAD ONC ARIA COURSE START DATE: NORMAL
RAD ONC ARIA COURSE TREATMENT ELAPSED DAYS: 0
RAD ONC ARIA FIRST TREATMENT DATE: NORMAL
RAD ONC ARIA PLAN FRACTIONS TREATED TO DATE: 1
RAD ONC ARIA PLAN ID: NORMAL
RAD ONC ARIA PLAN PRESCRIBED DOSE PER FRACTION: 10 GY
RAD ONC ARIA PLAN PRIMARY REFERENCE POINT: NORMAL
RAD ONC ARIA PLAN TOTAL FRACTIONS PRESCRIBED: 5
RAD ONC ARIA PLAN TOTAL PRESCRIBED DOSE: 5000 CGY
RAD ONC ARIA REFERENCE POINT DOSAGE GIVEN TO DATE: 10 GY
RAD ONC ARIA REFERENCE POINT ID: NORMAL
RAD ONC ARIA REFERENCE POINT SESSION DOSAGE GIVEN: 10 GY

## 2024-01-29 PROCEDURE — 77373 STRTCTC BDY RAD THER TX DLVR: CPT | Performed by: RADIOLOGY

## 2024-01-29 PROCEDURE — 77336 RADIATION PHYSICS CONSULT: CPT | Performed by: RADIOLOGY

## 2024-01-31 ENCOUNTER — HOSPITAL ENCOUNTER (OUTPATIENT)
Dept: RADIATION ONCOLOGY | Facility: HOSPITAL | Age: 74
Discharge: HOME | End: 2024-01-31

## 2024-01-31 LAB

## 2024-01-31 PROCEDURE — 77373 STRTCTC BDY RAD THER TX DLVR: CPT | Performed by: RADIOLOGY

## 2024-02-02 ENCOUNTER — HOSPITAL ENCOUNTER (OUTPATIENT)
Dept: RADIATION ONCOLOGY | Facility: HOSPITAL | Age: 74
Discharge: HOME | End: 2024-02-02

## 2024-02-02 ENCOUNTER — HOSPITAL ENCOUNTER (OUTPATIENT)
Dept: RADIATION ONCOLOGY | Facility: HOSPITAL | Age: 74
Setting detail: RADIATION/ONCOLOGY SERIES
End: 2024-02-02
Payer: MEDICARE

## 2024-02-02 VITALS
HEART RATE: 76 BPM | DIASTOLIC BLOOD PRESSURE: 82 MMHG | WEIGHT: 175.4 LBS | SYSTOLIC BLOOD PRESSURE: 130 MMHG | RESPIRATION RATE: 18 BRPM | OXYGEN SATURATION: 96 % | TEMPERATURE: 97.5 F | BODY MASS INDEX: 29.19 KG/M2

## 2024-02-02 DIAGNOSIS — R91.8 PULMONARY NODULES: Primary | ICD-10-CM

## 2024-02-02 LAB
RAD ONC ARIA COURSE ID: NORMAL
RAD ONC ARIA COURSE INTENT: NORMAL
RAD ONC ARIA COURSE LAST TREATMENT DATE: NORMAL
RAD ONC ARIA COURSE START DATE: NORMAL
RAD ONC ARIA COURSE TREATMENT ELAPSED DAYS: 4
RAD ONC ARIA FIRST TREATMENT DATE: NORMAL
RAD ONC ARIA PLAN FRACTIONS TREATED TO DATE: 3
RAD ONC ARIA PLAN ID: NORMAL
RAD ONC ARIA PLAN PRESCRIBED DOSE PER FRACTION: 10 GY
RAD ONC ARIA PLAN PRIMARY REFERENCE POINT: NORMAL
RAD ONC ARIA PLAN TOTAL FRACTIONS PRESCRIBED: 5
RAD ONC ARIA PLAN TOTAL PRESCRIBED DOSE: 5000 CGY
RAD ONC ARIA REFERENCE POINT DOSAGE GIVEN TO DATE: 30 GY
RAD ONC ARIA REFERENCE POINT ID: NORMAL
RAD ONC ARIA REFERENCE POINT SESSION DOSAGE GIVEN: 10 GY

## 2024-02-02 PROCEDURE — 77373 STRTCTC BDY RAD THER TX DLVR: CPT | Performed by: RADIOLOGY

## 2024-02-02 NOTE — PROGRESS NOTES
On Treatment Visit Note      Patient Name: Nancy Berman  : 1950   MRN: [id number]     Diagnosis:    Stage IV renal carcinoma status post right nephrectomyT () with peritoneal metastases (resected) and radiographic findings consistent with enlarging pulmonary metastases.     The patient is receiving stereotactic body radiotherapy to an enlarging right lower lobe mass.  The patient was seen today before treatment.  To date, the patient has received 2000 cGy of a 5000 cGy regimen.     Radiation Treatments       Active   Plans   RLL SBRT   Most recent treatment: Dose planned: 1,000 cGy (fraction 2 on 2024)   Total: Dose planned: 5,000 cGy (5 fractions)   Elapsed Days: 2      Reference Points   PTV   Most recent treatment: Dose given: 1,000 cGy (on 2024)   Total: Dose given: 2,000 cGy   Elapsed Days: 2           Historical   No historical radiation treatments to show.              Subjective      Treatment Tolerance:  The patient appears to be tolerating SBRT well.  She has no side effects to report from treatment.    Medications:     Current Outpatient Medications:     amLODIPine (NORVASC) 5 MG tablet, Take 1 tablet by mouth Daily., Disp: , Rfl:     aspirin 325 MG tablet, Take 1 tablet by mouth Daily., Disp: , Rfl:     atenolol (TENORMIN) 50 MG tablet, , Disp: , Rfl:     carbidopa-levodopa (SINEMET)  MG per tablet, Take 1 tablet by mouth 3 (Three) Times a Day., Disp: , Rfl:     gabapentin (NEURONTIN) 600 MG tablet, Take 1 tablet by mouth 3 (Three) Times a Day., Disp: , Rfl:     metFORMIN XR (GLUCOPHAGE-XR) 500 MG 24 hr tablet, Take 1 tablet by mouth Daily With Breakfast., Disp: , Rfl:     oxyCODONE-acetaminophen (PERCOCET)  MG per tablet, Take 1 tablet by mouth Every 6 (Six) Hours As Needed for Moderate Pain., Disp: 120 tablet, Rfl: 0    QUEtiapine (SEROquel) 25 MG tablet, Take 1 tablet by mouth Every Night., Disp: , Rfl:     Allergies:   Allergies   Allergen Reactions     Sulfa Antibiotics      Objective     Physical Exam:  The patient is an elderly lady with dementia.  Vital signs as below.    Vital Signs:   Vitals:    02/02/24 1450   BP: 130/82   Pulse: 76   Resp: 18   Temp: 97.5 °F (36.4 °C)   TempSrc: Temporal   SpO2: 96%   Weight: 79.6 kg (175 lb 6.4 oz)   PainSc: 0-No pain     Body mass index is 29.19 kg/m².     Neck: Short, supple no detectable cervical or periclavicular adenopathy  Heart: Regular rate and rhythm  Lungs: Clear bilaterally to auscultation  Chest: No radiation changes noted over right hemithorax  Neurologic: Cranial nerves intact no motor, sensory or cerebellar deficit.  normal gait  Psychiatric: History of dementia and/or Alzheimer's disease    Current Total XRT Dose (cGY):    Radiation Treatments       Active   Plans   RLL SBRT   Most recent treatment: Dose planned: 1,000 cGy (fraction 2 on 1/31/2024)   Total: Dose planned: 5,000 cGy (5 fractions)   Elapsed Days: 2      Reference Points   PTV   Most recent treatment: Dose given: 1,000 cGy (on 1/31/2024)   Total: Dose given: 2,000 cGy   Elapsed Days: 2           Historical   No historical radiation treatments to show.              Plan      Plan:   Patient was seen today for an on treatment visit. Patient is SBRT to an enlarging right lower lobe mass. Patient is stable and tolerating radiation therapy well with minimal side effects. Continue with radiation therapy.     I have reviewed treatment setup notes, checked and approved the daily guidance images. I reviewed dose delivery, treatment parameters and deemed them appropriate. We plan to continue radiation therapy as prescribed.     Digital speech recognition software was used to dictate parts of this note, some dictation errors may occur.    Mc George MD   02/02/24 15:04 EST

## 2024-02-05 ENCOUNTER — HOSPITAL ENCOUNTER (OUTPATIENT)
Dept: RADIATION ONCOLOGY | Facility: HOSPITAL | Age: 74
Discharge: HOME | End: 2024-02-05
Payer: MEDICARE

## 2024-02-05 LAB
RAD ONC ARIA COURSE ID: NORMAL
RAD ONC ARIA COURSE INTENT: NORMAL
RAD ONC ARIA COURSE LAST TREATMENT DATE: NORMAL
RAD ONC ARIA COURSE START DATE: NORMAL
RAD ONC ARIA COURSE TREATMENT ELAPSED DAYS: 7
RAD ONC ARIA FIRST TREATMENT DATE: NORMAL
RAD ONC ARIA PLAN FRACTIONS TREATED TO DATE: 4
RAD ONC ARIA PLAN ID: NORMAL
RAD ONC ARIA PLAN PRESCRIBED DOSE PER FRACTION: 10 GY
RAD ONC ARIA PLAN PRIMARY REFERENCE POINT: NORMAL
RAD ONC ARIA PLAN TOTAL FRACTIONS PRESCRIBED: 5
RAD ONC ARIA PLAN TOTAL PRESCRIBED DOSE: 5000 CGY
RAD ONC ARIA REFERENCE POINT DOSAGE GIVEN TO DATE: 40 GY
RAD ONC ARIA REFERENCE POINT ID: NORMAL
RAD ONC ARIA REFERENCE POINT SESSION DOSAGE GIVEN: 10 GY

## 2024-02-05 PROCEDURE — 77373 STRTCTC BDY RAD THER TX DLVR: CPT | Performed by: RADIOLOGY

## 2024-02-07 ENCOUNTER — HOSPITAL ENCOUNTER (OUTPATIENT)
Dept: RADIATION ONCOLOGY | Facility: HOSPITAL | Age: 74
Discharge: HOME | End: 2024-02-07

## 2024-02-07 LAB
RAD ONC ARIA COURSE ID: NORMAL
RAD ONC ARIA COURSE INTENT: NORMAL
RAD ONC ARIA COURSE LAST TREATMENT DATE: NORMAL
RAD ONC ARIA COURSE START DATE: NORMAL
RAD ONC ARIA COURSE TREATMENT ELAPSED DAYS: 9
RAD ONC ARIA FIRST TREATMENT DATE: NORMAL
RAD ONC ARIA PLAN FRACTIONS TREATED TO DATE: 5
RAD ONC ARIA PLAN ID: NORMAL
RAD ONC ARIA PLAN PRESCRIBED DOSE PER FRACTION: 10 GY
RAD ONC ARIA PLAN PRIMARY REFERENCE POINT: NORMAL
RAD ONC ARIA PLAN TOTAL FRACTIONS PRESCRIBED: 5
RAD ONC ARIA PLAN TOTAL PRESCRIBED DOSE: 5000 CGY
RAD ONC ARIA REFERENCE POINT DOSAGE GIVEN TO DATE: 50 GY
RAD ONC ARIA REFERENCE POINT ID: NORMAL
RAD ONC ARIA REFERENCE POINT SESSION DOSAGE GIVEN: 10 GY

## 2024-02-07 PROCEDURE — 77373 STRTCTC BDY RAD THER TX DLVR: CPT | Performed by: RADIOLOGY

## 2024-02-08 LAB
RAD ONC ARIA COURSE END DATE: NORMAL
RAD ONC ARIA COURSE ID: NORMAL
RAD ONC ARIA COURSE INTENT: NORMAL
RAD ONC ARIA COURSE LAST TREATMENT DATE: NORMAL
RAD ONC ARIA COURSE START DATE: NORMAL
RAD ONC ARIA COURSE TREATMENT ELAPSED DAYS: 9
RAD ONC ARIA FIRST TREATMENT DATE: NORMAL
RAD ONC ARIA PLAN FRACTIONS TREATED TO DATE: 5
RAD ONC ARIA PLAN ID: NORMAL
RAD ONC ARIA PLAN NAME: NORMAL
RAD ONC ARIA PLAN PRESCRIBED DOSE PER FRACTION: 10 GY
RAD ONC ARIA PLAN PRIMARY REFERENCE POINT: NORMAL
RAD ONC ARIA PLAN TOTAL FRACTIONS PRESCRIBED: 5
RAD ONC ARIA PLAN TOTAL PRESCRIBED DOSE: 5000 CGY
RAD ONC ARIA REFERENCE POINT DOSAGE GIVEN TO DATE: 50 GY
RAD ONC ARIA REFERENCE POINT ID: NORMAL

## 2024-02-28 ENCOUNTER — TELEPHONE (OUTPATIENT)
Dept: ONCOLOGY | Facility: CLINIC | Age: 74
End: 2024-02-28
Payer: MEDICARE

## 2024-02-28 DIAGNOSIS — C64.9 CARCINOMA OF KIDNEY, UNSPECIFIED LATERALITY: ICD-10-CM

## 2024-02-28 RX ORDER — OXYCODONE AND ACETAMINOPHEN 10; 325 MG/1; MG/1
1 TABLET ORAL EVERY 6 HOURS PRN
Qty: 120 TABLET | Refills: 0 | Status: SHIPPED | OUTPATIENT
Start: 2024-02-28

## 2024-02-28 NOTE — TELEPHONE ENCOUNTER
Caller: RodolfoAnnemarie    Relationship: Emergency Contact    Best call back number: [phone]     Requested Prescriptions:   Requested Prescriptions     Pending Prescriptions Disp Refills    oxyCODONE-acetaminophen (PERCOCET)  MG per tablet 120 tablet 0     Sig: Take 1 tablet by mouth Every 6 (Six) Hours As Needed for Moderate Pain.        Pharmacy where request should be sent: [facility] [place], [place] - [address] - [phone] [facility] [phone] FX     Last office visit with prescribing clinician: 12/20/2023   Last telemedicine visit with prescribing clinician: Visit date not found   Next office visit with prescribing clinician: 4/19/2024       Does the patient have less than a 3 day supply:  [x] Yes  [] No    Would you like a call back once the refill request has been completed: [] Yes [x] No    If the office needs to give you a call back, can they leave a voicemail: [] Yes [x] No    Valente Kaplan Rep   02/28/24 13:42 EST

## 2024-03-18 ENCOUNTER — OFFICE VISIT (OUTPATIENT)
Dept: RADIATION ONCOLOGY | Facility: HOSPITAL | Age: 74
End: 2024-03-18
Payer: MEDICARE

## 2024-03-18 VITALS
RESPIRATION RATE: 18 BRPM | TEMPERATURE: 97.3 F | HEART RATE: 64 BPM | OXYGEN SATURATION: 99 % | DIASTOLIC BLOOD PRESSURE: 70 MMHG | SYSTOLIC BLOOD PRESSURE: 106 MMHG

## 2024-03-18 DIAGNOSIS — R91.8 PULMONARY NODULES: Primary | ICD-10-CM

## 2024-03-18 PROCEDURE — 3074F SYST BP LT 130 MM HG: CPT | Performed by: RADIOLOGY

## 2024-03-18 PROCEDURE — 3078F DIAST BP <80 MM HG: CPT | Performed by: RADIOLOGY

## 2024-03-18 PROCEDURE — 1126F AMNT PAIN NOTED NONE PRSNT: CPT | Performed by: RADIOLOGY

## 2024-03-18 PROCEDURE — G0463 HOSPITAL OUTPT CLINIC VISIT: HCPCS | Performed by: RADIOLOGY

## 2024-03-18 PROCEDURE — 99024 POSTOP FOLLOW-UP VISIT: CPT | Performed by: RADIOLOGY

## 2024-03-18 NOTE — PROGRESS NOTES
FOLLOW UP NOTE    PATIENT:                                                      Nancy Berman  MEDICAL RECORD #:                        [id number]  :                                                          1950  COMPLETION DATE:   2024  DIAGNOSIS:     Pulmonary metastases Stage IV renal cell carcinoma status post nephrectomy on the right in  with peritoneal metastases that were also resected.      CANCER STAGING: Lung metastases for which the patient received stereotactic body radiation to an enlarging right lower lobe mass she received a total of 5000 cGy in 5 fractions.      Renal carcinoma  Staging form: Kidney, AJCC 8th Edition  - Clinical: No stage assigned - Unsigned  - Pathologic: Stage IV (pTX, pNX, pM1) - Signed by GABY Graham MD on 2018        BRIEF HISTORY: The patient was diagnosed with stage IV renal cell carcinoma status post nephrectomy in  with peritoneal metastases that were also resected.  She was recently found to have radiographic findings consistent with enlarging pulmonary metastases for which she received stereotactic radiation therapy.    MEDICATIONS: Medication reconciliation for the patient was reviewed and confirmed in the electronic medical record.    Review of Systems   Constitutional: Negative.    HENT:  Negative.     Eyes: Negative.    Respiratory: Negative.     Cardiovascular: Negative.    Gastrointestinal: Negative.    Genitourinary: Negative.     Musculoskeletal: Negative.    Skin: Negative.    Neurological: Negative.    Psychiatric/Behavioral: Negative.           Physical Exam  Constitutional:       Appearance: Normal appearance.   HENT:      Head: Normocephalic and atraumatic.   Eyes:      Extraocular Movements: Extraocular movements intact.      Pupils: Pupils are equal, round, and reactive to light.   Cardiovascular:      Rate and Rhythm: Normal rate and regular rhythm.   Pulmonary:      Effort: Pulmonary effort is normal.      Breath  sounds: Normal breath sounds.   Abdominal:      General: Abdomen is flat.      Palpations: Abdomen is soft.   Musculoskeletal:         General: Normal range of motion.      Cervical back: Normal range of motion.   Skin:     General: Skin is warm and dry.   Neurological:      General: No focal deficit present.      Mental Status: She is alert and oriented to person, place, and time. Mental status is at baseline.   Psychiatric:         Mood and Affect: Mood normal.         Behavior: Behavior normal.         VITAL SIGNS: There were no vitals filed for this visit.    Nancy Berman reports a pain score of 0..  .       The following portions of the patient's history were reviewed and updated as appropriate: allergies, current medications, past family history, past medical history, past social history, past surgical history and problem list.         Pulmonary nodules [R91.8]    IMPRESSION: The patient has recovered well from her stereotactic body radiation therapy for lung metastases.  To the right lower lobe.  She has no complaints.    RECOMMENDATIONS: The patient will be 6 getting a CT of the chest in 1 month and is to follow-up with Dr. Graham we will follow her up in 1 month as well.                No follow-ups on file.     Neisha Browning MD

## 2024-03-27 DIAGNOSIS — C64.9 CARCINOMA OF KIDNEY, UNSPECIFIED LATERALITY: ICD-10-CM

## 2024-03-27 RX ORDER — OXYCODONE AND ACETAMINOPHEN 10; 325 MG/1; MG/1
1 TABLET ORAL EVERY 6 HOURS PRN
Qty: 120 TABLET | Refills: 0 | Status: SHIPPED | OUTPATIENT
Start: 2024-03-27

## 2024-03-27 NOTE — TELEPHONE ENCOUNTER
Caller: RodolfoAnnemarie    Relationship: Emergency Contact    Best call back number: [phone]    Requested Prescriptions:   Requested Prescriptions     Pending Prescriptions Disp Refills    oxyCODONE-acetaminophen (PERCOCET)  MG per tablet 120 tablet 0     Sig: Take 1 tablet by mouth Every 6 (Six) Hours As Needed for Moderate Pain.        Pharmacy where request should be sent: [facility] [place], [place] - [address] - [phone] [facility] [phone] FX     Last office visit with prescribing clinician: 12/20/2023   Last telemedicine visit with prescribing clinician: Visit date not found   Next office visit with prescribing clinician: 4/22/2024     Does the patient have less than a 3 day supply:  [x] Yes  [] No    Would you like a call back once the refill request has been completed: [] Yes [x] No    If the office needs to give you a call back, can they leave a voicemail: [] Yes [x] No

## 2024-04-26 ENCOUNTER — HOSPITAL ENCOUNTER (OUTPATIENT)
Dept: RADIATION ONCOLOGY | Facility: HOSPITAL | Age: 74
Discharge: HOME | End: 2024-04-26
Payer: MEDICARE

## 2024-04-26 DIAGNOSIS — C64.9 CARCINOMA OF KIDNEY, UNSPECIFIED LATERALITY: ICD-10-CM

## 2024-04-26 DIAGNOSIS — C64.9 RENAL CELL CARCINOMA, UNSPECIFIED LATERALITY: ICD-10-CM

## 2024-04-26 DIAGNOSIS — R91.8 PULMONARY NODULES: ICD-10-CM

## 2024-04-26 PROCEDURE — 71250 CT THORAX DX C-: CPT | Performed by: RADIOLOGY

## 2024-04-26 PROCEDURE — 71250 CT THORAX DX C-: CPT

## 2024-04-29 DIAGNOSIS — C64.9 CARCINOMA OF KIDNEY, UNSPECIFIED LATERALITY: ICD-10-CM

## 2024-04-29 RX ORDER — OXYCODONE AND ACETAMINOPHEN 10; 325 MG/1; MG/1
1 TABLET ORAL EVERY 6 HOURS PRN
Qty: 120 TABLET | Refills: 0 | Status: SHIPPED | OUTPATIENT
Start: 2024-04-29

## 2024-04-29 NOTE — TELEPHONE ENCOUNTER
Caller: RodolfoAnnemarie    Relationship: Emergency Contact    Best call back number: [phone]     Requested Prescriptions:   Requested Prescriptions     Pending Prescriptions Disp Refills    oxyCODONE-acetaminophen (PERCOCET)  MG per tablet 120 tablet 0     Sig: Take 1 tablet by mouth Every 6 (Six) Hours As Needed for Moderate Pain.        Pharmacy where request should be sent: [facility] [place], [place] - [address]  - [phone] [facility] [phone] FX     Last office visit with prescribing clinician: 12/20/2023   Last telemedicine visit with prescribing clinician: Visit date not found   Next office visit with prescribing clinician: 5/2/2024         Does the patient have less than a 3 day supply:  [x] Yes  [] No    Would you like a call back once the refill request has been completed: [] Yes [x] No    If the office needs to give you a call back, can they leave a voicemail: [] Yes [x] No    Valente Sánchez Rep   04/29/24 15:14 EDT

## 2024-05-02 ENCOUNTER — OFFICE VISIT (OUTPATIENT)
Dept: RADIATION ONCOLOGY | Facility: HOSPITAL | Age: 74
End: 2024-05-02
Payer: MEDICARE

## 2024-05-02 ENCOUNTER — LAB (OUTPATIENT)
Dept: ONCOLOGY | Facility: CLINIC | Age: 74
End: 2024-05-02
Payer: MEDICARE

## 2024-05-02 ENCOUNTER — OFFICE VISIT (OUTPATIENT)
Dept: ONCOLOGY | Facility: CLINIC | Age: 74
End: 2024-05-02
Payer: MEDICARE

## 2024-05-02 VITALS
WEIGHT: 175 LBS | DIASTOLIC BLOOD PRESSURE: 86 MMHG | HEART RATE: 87 BPM | SYSTOLIC BLOOD PRESSURE: 126 MMHG | RESPIRATION RATE: 18 BRPM | BODY MASS INDEX: 29.12 KG/M2 | TEMPERATURE: 97.3 F | OXYGEN SATURATION: 98 %

## 2024-05-02 VITALS
HEART RATE: 92 BPM | OXYGEN SATURATION: 99 % | RESPIRATION RATE: 18 BRPM | DIASTOLIC BLOOD PRESSURE: 87 MMHG | WEIGHT: 170.4 LBS | TEMPERATURE: 97.3 F | BODY MASS INDEX: 28.36 KG/M2 | SYSTOLIC BLOOD PRESSURE: 144 MMHG

## 2024-05-02 DIAGNOSIS — C64.9 RENAL CELL CARCINOMA, UNSPECIFIED LATERALITY: ICD-10-CM

## 2024-05-02 DIAGNOSIS — C64.9 CARCINOMA OF KIDNEY, UNSPECIFIED LATERALITY: Primary | ICD-10-CM

## 2024-05-02 DIAGNOSIS — R91.8 PULMONARY NODULES: Primary | ICD-10-CM

## 2024-05-02 DIAGNOSIS — R91.8 PULMONARY NODULES: ICD-10-CM

## 2024-05-02 DIAGNOSIS — C64.9 CARCINOMA OF KIDNEY, UNSPECIFIED LATERALITY: ICD-10-CM

## 2024-05-02 LAB
ALBUMIN SERPL-MCNC: 3.9 G/DL (ref 3.5–5.2)
ALBUMIN/GLOB SERPL: 0.9 G/DL
ALP SERPL-CCNC: 153 U/L (ref 39–117)
ALT SERPL W P-5'-P-CCNC: 5 U/L (ref 1–33)
ANION GAP SERPL CALCULATED.3IONS-SCNC: 11.5 MMOL/L (ref 5–15)
AST SERPL-CCNC: 27 U/L (ref 1–32)
BASOPHILS # BLD AUTO: 0.05 10*3/MM3 (ref 0–0.2)
BASOPHILS NFR BLD AUTO: 0.9 % (ref 0–1.5)
BILIRUB SERPL-MCNC: 0.4 MG/DL (ref 0–1.2)
BUN SERPL-MCNC: 21 MG/DL (ref 8–23)
BUN/CREAT SERPL: 13.6 (ref 7–25)
CALCIUM SPEC-SCNC: 8.8 MG/DL (ref 8.6–10.5)
CHLORIDE SERPL-SCNC: 103 MMOL/L (ref 98–107)
CO2 SERPL-SCNC: 18.5 MMOL/L (ref 22–29)
CREAT SERPL-MCNC: 1.54 MG/DL (ref 0.57–1)
DEPRECATED RDW RBC AUTO: 45.1 FL (ref 37–54)
EGFRCR SERPLBLD CKD-EPI 2021: 35.5 ML/MIN/1.73
EOSINOPHIL # BLD AUTO: 0.12 10*3/MM3 (ref 0–0.4)
EOSINOPHIL NFR BLD AUTO: 2.2 % (ref 0.3–6.2)
ERYTHROCYTE [DISTWIDTH] IN BLOOD BY AUTOMATED COUNT: 12.1 % (ref 12.3–15.4)
GLOBULIN UR ELPH-MCNC: 4.5 GM/DL
GLUCOSE SERPL-MCNC: 193 MG/DL (ref 65–99)
HCT VFR BLD AUTO: 41.6 % (ref 34–46.6)
HGB BLD-MCNC: 13 G/DL (ref 12–15.9)
IMM GRANULOCYTES # BLD AUTO: 0.01 10*3/MM3 (ref 0–0.05)
IMM GRANULOCYTES NFR BLD AUTO: 0.2 % (ref 0–0.5)
LYMPHOCYTES # BLD AUTO: 1.42 10*3/MM3 (ref 0.7–3.1)
LYMPHOCYTES NFR BLD AUTO: 25.6 % (ref 19.6–45.3)
MCH RBC QN AUTO: 31.7 PG (ref 26.6–33)
MCHC RBC AUTO-ENTMCNC: 31.3 G/DL (ref 31.5–35.7)
MCV RBC AUTO: 101.5 FL (ref 79–97)
MONOCYTES # BLD AUTO: 0.46 10*3/MM3 (ref 0.1–0.9)
MONOCYTES NFR BLD AUTO: 8.3 % (ref 5–12)
NEUTROPHILS NFR BLD AUTO: 3.48 10*3/MM3 (ref 1.7–7)
NEUTROPHILS NFR BLD AUTO: 62.8 % (ref 42.7–76)
NRBC BLD AUTO-RTO: 0 /100 WBC (ref 0–0.2)
PLATELET # BLD AUTO: 231 10*3/MM3 (ref 140–450)
PMV BLD AUTO: 10 FL (ref 6–12)
POTASSIUM SERPL-SCNC: 4.9 MMOL/L (ref 3.5–5.2)
PROT SERPL-MCNC: 8.4 G/DL (ref 6–8.5)
RBC # BLD AUTO: 4.1 10*6/MM3 (ref 3.77–5.28)
SODIUM SERPL-SCNC: 133 MMOL/L (ref 136–145)
WBC NRBC COR # BLD AUTO: 5.54 10*3/MM3 (ref 3.4–10.8)

## 2024-05-02 PROCEDURE — 80053 COMPREHEN METABOLIC PANEL: CPT | Performed by: INTERNAL MEDICINE

## 2024-05-02 PROCEDURE — 3077F SYST BP >= 140 MM HG: CPT | Performed by: INTERNAL MEDICINE

## 2024-05-02 PROCEDURE — G0463 HOSPITAL OUTPT CLINIC VISIT: HCPCS | Performed by: RADIOLOGY

## 2024-05-02 PROCEDURE — 85025 COMPLETE CBC W/AUTO DIFF WBC: CPT | Performed by: INTERNAL MEDICINE

## 2024-05-02 PROCEDURE — 99214 OFFICE O/P EST MOD 30 MIN: CPT | Performed by: INTERNAL MEDICINE

## 2024-05-02 PROCEDURE — 3079F DIAST BP 80-89 MM HG: CPT | Performed by: INTERNAL MEDICINE

## 2024-05-02 PROCEDURE — 1126F AMNT PAIN NOTED NONE PRSNT: CPT | Performed by: INTERNAL MEDICINE

## 2024-05-02 NOTE — PROGRESS NOTES
"  Name:  Nancy Berman  :  1950  Date:  2024     REFERRING PROVIDER  ANGELA Clark    PRIMARY CARE PROVIDER  Julee Lerner APRN    REASON FOR FOLLOWUP  1. Carcinoma of kidney, unspecified laterality      CHIEF COMPLAINT  Chronic back pain, stable.    Dear Ms. Eduarda,    HISTORY OF PRESENT ILLNESS:   I saw Ms. Berman in follow up today in our medical oncology clinic. As you are aware, she is a pleasant, 74 y.o.,  female with a history of hypertension, diabetes and chronic kidney disease who was initially diagnosed with, and underwent an uncomplicated resection of, a left-sided kidney cancer in . Adjuvant therapy was not indicated. Ultimately, she had been doing very well from the standpoint of this disease ever since then (for the past ten years). However, in early summer 2018, as part of a routine follow up evaluation for her chronic kidney disease (of her remaining, right kidney), you ordered a renal ultrasound. Unexpectedly, this study identified a retroperitoneal mass; and subsequent CT scans (performed in late 2018) showed \"nodular foci in the retroperitoneum\", a \"heterogeneous lesion inferior in the right lobe of the liver\" and \"small, tiny peripheral densities in the lungs most noticeable on the right\", consistent with more widespread and metastatic disease. She underwent a CT-guided biopsy of a retroperitoneal mass at  on 2018, and the pathology results were consistent with recurrent, renal cell carcinoma ( urology ultimately wound up performing a palliative debulking surgery to resect this oligometastatic, retroperitoneal mass on 2019). She was subsequently referred to our clinic for further evaluation and management. For the most part, her disease has been very manageable with occasional, localized therapies (including 2019's surgery) alone ever since then.    INTERIM HISTORY:  Ms. Berman returns to clinic today for follow " up yet again accompanied by her daughter. She has a history of longstanding back pain (which, for years, has had a tendency to worsen when she stands up to wash dishes and forces her to sit down periodically to rest) and fatigue. This symptom has recently been back under fairly good control after we increased the dose of her prn Percocet slightly (to 10 mg) q6hr in late 2023. She underwent a five fraction course of SBRT to an enlarging, right lower lobe lung nodule in early February; and she confirms today that she tolerated this therapy very well, without any noticeable side effects. She overall continues to feel the same as she usually does, and she once again has no new or other specific complaints.    Past Medical History:   Diagnosis Date    Arthritis     Cancer     left Kidney    Chronic kidney disease, stage III (moderate)     Diabetes mellitus     High cholesterol     Hypertension     Low back pain     Spinal stenosis        Past Surgical History:   Procedure Laterality Date    BLADDER SURGERY      HYSTERECTOMY      KIDNEY SURGERY Left     Nephrectomy for cancerous mass    MT TX HUMRAL SHAFT FX W/INSJ IMED IMPLT W/W CERCLGE Left 4/20/2017    Procedure: LEFT HUMERUS INTRAMEDULLARY NAIL/BHAVANA INSERTION;  Surgeon: Jose Willard MD;  Location: [facility];  Service: Orthopedics       Social History     Socioeconomic History    Marital status: Legally    Tobacco Use    Smoking status: Never    Smokeless tobacco: Never   Substance and Sexual Activity    Alcohol use: Yes     Comment: social    Drug use: Defer    Sexual activity: Defer       Family History   Problem Relation Age of Onset    Parkinsonism Mother     Heart disease Father     Diabetes Father     Cancer Brother        Allergies   Allergen Reactions    Sulfa Antibiotics        Current Outpatient Medications   Medication Sig Dispense Refill    amLODIPine (NORVASC) 5 MG tablet Take 1 tablet by mouth Daily.      aspirin 325 MG tablet Take 1  tablet by mouth Daily.      atenolol (TENORMIN) 50 MG tablet       carbidopa-levodopa (SINEMET)  MG per tablet Take 1 tablet by mouth 3 (Three) Times a Day.      gabapentin (NEURONTIN) 600 MG tablet Take 1 tablet by mouth 3 (Three) Times a Day.      metFORMIN XR (GLUCOPHAGE-XR) 500 MG 24 hr tablet Take 1 tablet by mouth Daily With Breakfast.      oxyCODONE-acetaminophen (PERCOCET)  MG per tablet Take 1 tablet by mouth Every 6 (Six) Hours As Needed for Moderate Pain. 120 tablet 0    QUEtiapine (SEROquel) 25 MG tablet Take 1 tablet by mouth Every Night.       No current facility-administered medications for this visit.     REVIEW OF SYSTEMS  CONSTITUTIONAL:  No fever, chills or night sweats. Chronic fatigue, stable.  EYES:  No blurry vision, diplopia or other vision changes.  ENT:  No hearing loss, nosebleeds or sore throat.  CARDIOVASCULAR:  No palpitations, arrhythmia, syncopal episodes or edema.  PULMONARY:  No hemoptysis, wheezing, chronic cough or shortness of breath.  GASTROINTESTINAL:  No nausea or vomiting.  No constipation or diarrhea. No abdominal pain.  GENITOURINARY:  No hematuria or kidney stones. Recent burning with urination, for which she is starting an antibiotic today, as per the HPI above.  MUSCULOSKELETAL:  As per the HPI above.  INTEGUMENTARY: No rashes or pruritus.  ENDOCRINE:  No excessive thirst or hot flashes.  HEMATOLOGIC:  No history of free bleeding, spontaneous bleeding or clotting.  IMMUNOLOGIC:  No allergies or frequent infections.  NEUROLOGIC: No numbness, tingling, seizures or weakness.  PSYCHIATRIC:  Mild chronic anxiety, stable.    PHYSICAL EXAMINATION  /87   Pulse 92   Temp 97.3 °F (36.3 °C) (Temporal)   Resp 18   Wt 77.3 kg (170 lb 6.4 oz)   SpO2 99%   BMI 28.36 kg/m²     ECOG score: 0   GENERAL:  A well-developed, well-nourished, elderly, black female in no acute distress.  HEENT:  Pupils equally round and reactive to light. Extraocular muscles  intact.  CARDIOVASCULAR:  Regular rate and rhythm. No murmurs, gallops or rubs.  LUNGS:  Clear to auscultation bilaterally.  ABDOMEN:  Soft, nontender, nondistended with positive bowel sounds.  EXTREMITIES:  No clubbing, cyanosis or edema bilaterally.  SKIN:  No rashes or petechiae.  NEURO:  Cranial nerves grossly intact. No focal deficits. Mild, resting tremors, stable.  PSYCH:  Alert and oriented x3.    LABORATORY  Lab Results   Component Value Date    WBC 5.54 05/02/2024    HGB 13.0 05/02/2024    HCT 41.6 05/02/2024    .5 (H) 05/02/2024     05/02/2024    NEUTROABS 3.48 05/02/2024       Lab Results   Component Value Date     (L) 05/02/2024    K 4.9 05/02/2024     05/02/2024    CO2 18.5 (L) 05/02/2024    BUN 21 05/02/2024    CREATININE 1.54 (H) 05/02/2024    GLUCOSE 193 (H) 05/02/2024    CALCIUM 8.8 05/02/2024    AST 27 05/02/2024    ALT 5 05/02/2024    ALKPHOS 153 (H) 05/02/2024    BILITOT 0.4 05/02/2024    PROTEINTOT 8.4 05/02/2024    ALBUMIN 3.9 05/02/2024     CBC (05/02/2024): WBCs: 5.54; HgB: 13.0; Hct: 41.6; platelets: 231  CBC (12/20/2023): WBCs: 5.96; HgB: 13.9; Hct: 46.1; platelets: 246  CBC (06/20/2023): WBCs: 8.89; HgB: 12.2; Hct: 39.1; platelets: 275  CBC (12/20/2022): WBCs: 7.0; HgB: 12.5; Hct: 43.5; platelets: 293  CBC (03/05/2021): WBCs: 4.4; HgB: 13.8; Hct: 43.8; platelets: 263  CBC (12/11/2020): WBCs: 7.2; HgB: 13.2; Hct: 41.0; platelets: 290  CBC (05/21/2020): WBCs: 6.9; HgB: 14.2; Hct: 44.3; platelets: 282  CBC (10/22/2019): WBCs: 6.1; HgB: 12.8; Hct: 39.1; platelets: 318  CBC (06/14/2019): WBCs: 4.7; HgB: 12.7; Hct: 38.7; platelets: 252  CBC (03/05/2019): WBCs: 5.9; HgB: 13.6; Hct: 41.4; platelets: 264  CBC (11/20/2018): WBCs: 5.3; HgB: 13.0; Hct: 40.4; platelets: 243  CBC (08/23/2018): WBCs: 5.7; HgB: 13.8; Hct: 43.4; platelets: 248    IMAGING  CT chest, abdomen and pelvis with contrast (06/30/2018, at ):  Impression:  1) Small, tiny peripheral densities in the lungs  most noticeable on the right.  2) Heterogeneous lesion inferior in the right lobe of the liver.  3) Heterogeneous enhancing nodular foci in the retroperitoneum.  4) Vascular calcifications with moderate coronary artery disease.  5) Moderate degenerative changes in the spine with moderate spinal canal stenosis.    CT chest without contrast (08/16/2018):  Impression: Negative CT scan of the thorax. Nothing is seen to suggest metastatic disease in the chest.    CT abdomen and pelvis without contrast (08/16/2018):  Impression: Patient status post right nephrectomy. There is a low density mass in the retroperitoneum that would be consistent with metastatic disease to the retroperitoneal lymph nodes possibly involving the inferior vena cava. It measures approximately 3.8 cm in diameter.    NM bone scan, whole body (09/27/2018):  Impression: There are no findings to suggest metastatic disease or to explain the patient's symptoms.    CT chest without contrast (11/20/2018):  Impression: Stable CT scan of the thorax. Nothing seen to suggest metastatic disease. There is fairly heavy coronary artery calcification. There were no pulmonary parenchymal lung nodules or pleural effusions. No enlarged lymph nodes were demonstrated.    CT abdomen and pelvis without contrast (11/20/2018):  Impression:  Patient status post right nephrectomy. There continues to be some abnormal soft tissue density in the retroperitoneum in the pericaval area concerning for retroperitoneal lymphadenopathy. This is completely unchanged, however, from 08/2018 exam.    CT abdomen and pelvis without contrast (03/05/2019):  Impression: Stable appearance of the abdomen including soft tissue density mass in the retroperitoneal region. There continues to be some abnormal nearly 7 cm soft tissue density in the retroperitoneum in the pericaval area that remains stable.    CT chest, abdomen and pelvis with contrast (10/30/2019):  Impression: A noncalcified nodule  right lower lobe on image 54 series 3 measures 7 x 6 mm and appears slightly larger, previously measuring 6 x 4 mm. No new nodules. Interval resection of retroperitoneal maxwell masses. Stable, lobulated mass in the lower right hepatic lobe.    CT chest without contrast (05/20/2020):  Impression:  1) No parenchymal soft tissue nodules or masses.  2) No pericardial or pleural effusions.  3) No adenopathy.    CT chest, abdomen and pelvis with contrast (08/05/2020, compared to 10/30/2019, at ):  Impression: No evidence of disease progression with unchanged 8 mm noncalcified right lower lobe pulmonary nodule. Unchanged, lobulated mass in the right hemiliver. No evidence of disease progression.    NM bone scan, whole body (12/30/2020):  Impression: No evidence of metastatic disease to the skeleton.    CT chest with contrast (02/26/2021):  Impression: At this time there are no CT findings to suggest metastatic disease in the chest from the patient's renal cell carcinoma.    CT abdomen and pelvis with contrast (02/26/2021, compared to 02/22/2019):  Impression: The patient has developed an area of abnormal density in the right lobe of the liver not seen on the previous CTs. It measures 28 x 12 mm. [Upon further review with radiology, while this area was not seen on the previous, noncontrasted CTs of the abdomen, it is visible (and stable) on an available MRI from back in 2009.] The soft tissue abnormality in the retroperitoneum near the level of the renal vein is improved in comparing with earlier CTs. The right kidney is absent consistent with previous nephrectomy.    CT chest, abdomen and pelvis with contrast  (01/19/2022):  Impression:  1) Stable right lower lobe 8 mm pulmonary nodule unchanged dating back to 2019. No new pulmonary nodule or evidence of metastatic disease of the chest.  2) Stable appearance of lobulated right hemiliver lesion without interval change or evidence for progression in the abdomen and pelvis.  Stable appearance of the right nephrectomy site.      CT abdomen and pelvis (01/17/2023, compared to 02/26/2021):  Impression:  1) Development of small pulmonary nodule in the right lower lobe and increased size of a pre-existing pulmonary nodule in the right lower lobe (now measures 10.9 mm) suspicious for metastatic disease.  2) No change in size of appearance of a hypodense right lobe liver lesion. No additional solid organ lesions identified.  3) No abdominal or pelvic adenopathy.  4) Right nephrectomy. Other nonacute/incidental findings.    CT chest without contrast (12/18/2023, compared to 02/26/2021):  Impression:  1) Size increase of previously described nodules of the right lower lobe and possible development of new tiny nodules in the right lung. 4.6 mm nodule right lower lobe, image #34. 14.0 x 14.1 mm nodule right lower lobe, image #43, was previously 10.9 x 12.7 mm indicating size increase. Tiny nodule in the more posterior basal segment of the right lower lobe is 3.3 mm and may be new or size increased from previous. 4.4 mm nodule right lower lobe was previously 3.4 mm and may be slightly increased in size. New pulmonary nodule in the right middle lobe is 4.9 mm.  2) No thoracic adenopathy.  3) Cardiomegaly and severe coronary artery calcifications.    CT abdomen and pelvis without contrast (12/18/2023, compared to 01/17/2023):  Impression:  1) Stable hypodense lesion right lobe liver which is poorly defined.  2) No new solid organ lesions identified.  3) Stable changes of previous right nephrectomy.  4) No abdominal or pelvic adenopathy.  5) Refer to chest CT for pulmonary findings.  6) Advanced degenerative changes lumbar spine with multilevel stenosis. Other incidental and nonacute findings are stable from previous.    CT chest without contrast (04/26/2024, compared to 12/18/2023):  Impression:  1) Parenchymal nodules are stable since the previous exam.  2) Lytic focus in right ninth rib also  stable.  3) No evidence of new metastatic disease.    PATHOLOGY  Right kidney, radical nephrectomy (11/05/2008, per pathology report; outside slides):  Renal cell carcinoma, clear cell type. Unremarkable adrenal gland.    Right retroperitoneal mass (07/12/2018):  Positive for malignancy; consistent with recurrent renal cell carcinoma. PAX8 is strongly positive in tumor cells, and Inhibin is negative.    Retroperitoneal mass, resection (07/22/2019):  Metastatic renal cell carcinoma.    RADIATION THERAPY  Radiation Treatments       Active   No active radiation treatments to show.     Historical   Plans   RLL SBRT   Most recent treatment: Dose planned: 1,000 cGy (fraction 5 on 2/7/2024)   Total: Dose planned: 5,000 cGy (5 fractions)   Elapsed Days: 9      Reference Points   PTV   Most recent treatment: Dose given: -- (on 2/7/2024)   Total: Dose given: 5,000 cGy   Elapsed Days: 9                   IMPRESSION AND PLAN  Ms. Berman is a 74 y.o.,  female with:  Renal cell carcinoma: Initially diagnosed in 2008 and status post a left-sided nephrectomy, with no signs/symptoms of recurrent disease for the next ten years. Unfortunately, as of June 2018, this was/is no longer the case. CT scans performed at  on 06/30/2018 identified multiple foci in the retroperitoneum (along with possible lung lesions; although lung imaging since then has been less concerning), consistent with (and CT-guided FNA confirmed) recurrent, and now metastatic, disease. I have had multiple, long discussions with the patient and her daughters since the time of her initial presentation in our clinic (on 07/25/2018) regarding this diagnosis and, in general terms, its prognosis. In short, they remain aware that this disease is no longer classically curable; but that, particularly given her good performance status, it was (and remains) treatable. While there have always been multiple, systemic options available to us (including PO TKIs,  such as pazopanib, and IV immunotherapy, such as qmonthly nivolumab), it has continued to be reasonable to defer starting any specific, systemic treatment to date. Renal cell carcinoma is typically slow growing, and this has been especially true in her case. She did not relapse for ten years, intermittent CT scans since the ones performed in late June 2018 at  have shown, and continue to show, an overall low, tumor burden; and she was (and remains) minimally (other than issue #3) symptomatic (and issue #3 likely has more to do with arthritis/degenerative disc disease than her cancer). In general, the longer an elderly patient with multiple comobidities can avoid the potential side effects of palliative treatment, the better. Given her continued, very stable, oligometastatic disease, by early 2019 it seemed worthwhile to send her back to urology for reevaluation to consider resecting the retroperitoneal, soft tissue mass. This referral was made in Spring 2019, and following an evaluation at  (which included an MRI to make sure the mass did not appear to involve the great vessels),  urology ultimately took her to the OR on 07/22/2019. This procedure went extremely well. Today, nearly five (5) years later, she remains in an overall very good partial remission (see issue #2, below). Now that she has received a five fraction course of SBRT to an enlarging, dominant nodule in the lower lobe of the right lung, the most recent repeat imaging (including the CTs of the abdomen and pelvis performed on 12/18/2023 and the chest CT performed on 04/26/2024 (all summarized above) once again does not show any signs of further progression of her disease. She will continue to follow up with  urology on an as needed basis. We will see her back in our clinic in four months (~late August) with a CBC, CMP and repeat CTs of the chest, abdomen and pelvis without contrast.  Pulmonary nodules: While otherwise unremarkable, the repeat  CT scans performed on 12/18/2023 (and summarized above) showed, for the first time in years, some signs of disease progression (within the lungs). While all of the noted lung nodules are not necessarily malignant, the very slow, but steady increase in the largest nodule (which, in the right lower lobe, had increased to ~1.4 cm in size), the probable development of at least a few, new nodules and their locations in the bilateral lower lungs were/are all suggestive of a late re-recurrence of issue #1. Biopsying one of these lesions would still be ideal to confirm the exact etiology (a second, unrelated malignancy, particularly lung cancer, is certainly possible); however, with their distal locations and current sizes, at a minimum, a navigational bronchoscopy would likely still be required to do so. Simply presuming that all/most of these are related to issue #1 and proceeding with palliative immunotherapy also still seems premature, as she remains asymptomatic from the standpoint of these relatively small nodules, and immunotherapy is not necessarily side effect free (or guaranteed to work). Given all of this, following a long discussion in late 2023 regarding the potential risks vs. benefits of such therapy, the patient and her daughter were agreeable to our referring her to [facility] radiation oncology for SBRT to this dominant, enlarging nodule(s) in the right lower lobe; and she received a five fraction course in early February 2024. As discussed above, the follow up chest CT performed on 04/26/2024 (and summarized above) does not show any evidence of additional, progressive disease. We will see her back in our clinic in four months (~late August) with a CBC, CMP and repeat CTs.of the chest, abdomen and pelvis without contrast.  Back pain: A chronic issue which the retroperitoneal foci from issue #1 was likely exacerbating; but which, now that the mass has been excised, is likely still primarily due to longstanding  arthritis. As her neck pain has recently been a little worse, we further evaluated with a repeat NM bone scan; and this study, performed on 12/30/2020 (and summarized above) remained stable and completely unremarkable. With her diagnosis with issue #1, our clinic has agreed to provide her with her pain medicine Continue Percocet 10/325 mg q6hr prn. Continue to monitor.  The patient and her daughter were in agreement with these plans.    It is a pleasure to participate in Ms. Berman's care. Please do not hesitate to call with any questions or concerns that you may have.    A  total of 30 minutes were spent coordinating this patient’s care in clinic today; more than 50% of this time was face-to-face with the patient and her daughter, reviewing her interim medical history, discussing the results of last week's repeat CT of the chest and counseling on the current treatment and followup plan. All questions were answered to their satisfaction.    FOLLOW UP  Continue Percocet 10 mg PO q6hr prn. With [facility] radiation oncology prn, as planned. Return to our clinic in 4 months (~late August 2024) with a CBC, CMP and repeat CTs of the chest, abdomen and pelvis without contrast.            This document was electronically signed by GABY Graham MD May 2, 2024 11:24 EDT      CC: ANGELA Clark MD Stephen E. Strup, MD

## 2024-05-02 NOTE — PROGRESS NOTES
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  The patient is status post SBRT to an enlarging right lower lobe nodule.    History Summary:  Mrs. 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  The patient was noted to develop retroperitoneal masses consistent with metastatic renal cell carcinoma in 2018.  The patient underwent resection of the masses on 07/2019.  Surveillance imaging of the chest has revealed the development of pulmonary nodules consistent with metastases.  The patient was noted to have enlargement of the right lower lobe nodule/mass from 3.3 mm to 14.1 mm.  The patient was referred to our service for stereotactic body radiosurgery (SBRT) to the dominant right lower lobe nodule.  It is hoped that palliative immunotherapy could be delayed until the patient became symptomatic.    Mrs. Berman completed a 5000 cGy SBRT regimen to the right lower lobe mass on 02/07/2024.  Treatment was well-tolerated.    Surveillance CT scan of chest without contrast (04/26/2024) revealed multiple parenchymal pulmonary nodules bilaterally.  The largest nodule in the right lower lung (treated by SBRT) measures 14 mm and stable.  No new lung nodules were identified.  The patient has a stable lytic focus in the right ninth rib.    The patient states that she is doing well.  She denies shortness of breath while resting, chest pains, productive cough, hemoptysis, and hoarseness.  She does note shortness of breath on walking for short distances.  The patient states that the right rib lesion is currently asymptomatic.    Review of Systems:     14 point comprehensive review of systems was  negative.    Past Medical History:   Diagnosis Date    Arthritis     Cancer     left Kidney    Chronic kidney disease, stage III (moderate)     Diabetes mellitus     High cholesterol     Hypertension     Low back pain     Spinal stenosis         Past Surgical History:   Procedure Laterality Date    BLADDER SURGERY      HYSTERECTOMY      KIDNEY SURGERY Left     Nephrectomy for cancerous mass    RI TX HUMRAL SHAFT FX W/INSJ IMED IMPLT W/W CERCLGE Left 4/20/2017    Procedure: LEFT HUMERUS INTRAMEDULLARY NAIL/BHAVANA INSERTION;  Surgeon: Jose Willard MD;  [facility];  Service: Orthopedics      Family History   Problem Relation Age of Onset    Parkinsonism Mother     Heart disease Father     Diabetes Father     Cancer Brother         Social History     Socioeconomic History    Marital status: Legally    Tobacco Use    Smoking status: Never    Smokeless tobacco: Never   Substance and Sexual Activity    Alcohol use: Yes     Comment: social    Drug use: Defer    Sexual activity: Defer     Allergies:  Sulfa antibiotics   Prior to Admission medications    Medication Sig Start Date End Date Taking? Authorizing Provider   amLODIPine (NORVASC) 5 MG tablet Take 1 tablet by mouth Daily.   Yes Link Brownlee MD   aspirin 325 MG tablet Take 1 tablet by mouth Daily.   Yes Link Bronwlee MD   atenolol (TENORMIN) 50 MG tablet  5/15/20  Yes Link Brownlee MD   carbidopa-levodopa (SINEMET)  MG per tablet Take 1 tablet by mouth 3 (Three) Times a Day.   Yes Link Brownlee MD   gabapentin (NEURONTIN) 600 MG tablet Take 1 tablet by mouth 3 (Three) Times a Day.   Yes Link Brownlee MD   metFORMIN XR (GLUCOPHAGE-XR) 500 MG 24 hr tablet Take 1 tablet by mouth Daily With Breakfast.   Yes Link Brownlee MD   oxyCODONE-acetaminophen (PERCOCET)  MG per tablet Take 1 tablet by mouth Every 6 (Six) Hours As Needed for Moderate Pain. 4/29/24  Yes Virginia Butler MD    QUEtiapine (SEROquel) 25 MG tablet Take 1 tablet by mouth Every Night.   Yes Provider, MD Link      Pain:(on a scale of 0-10)    Pain Score    05/02/24 0913   PainSc: 0-No pain      Nancy Berman reports a pain score of 0.  .     Quality of Life:   KPS: 70  ECOG: 3    Physical Examination:  Vitals: Blood pressure 126/86, pulse 87, respirations 18, temperature 97.3 °F, pulse oximetry on room air 98%  Constitutional: The patient is a somewhat frail-appearing, well-nourished elderly -American female in no acute distress.  Alert and oriented ×3.  HEENT: Atraumatic. Normocephalic. No abnormalities noted.  Lymphatics: No cervical, supraclavicular, or axillary, or inguinal lymphadenopathy is palpated.  CV: Regular rate and rhythm.  No murmurs, rubs, or gallops are appreciated.  Respiratory: Lungs clear to auscultation.  Breath sounds equal bilaterally.  GI: Abdomen soft, nontender, nondistended, with no hepatosplenomegaly or masses palpated.  Chest: No discomforted elicited on patient of the right anterolateral chest wall (site of costal metastases)  Extremities: No clubbing, cyanosis, or edema.  Neurologic: Cranial nerves II through XII are grossly intact, with no focal neurological deficits noted on exam. Mild resting tremors noted.  Psychiatric: Alert and oriented x3. Normal affect, with no anxiety or depression noted.    Radiographs :   CT scan (04/26/2024) described above    Pathology:   Described above    Labs:   Lab Results   Component Value Date    GLUCOSE 111 (H) 12/20/2023    BUN 28 (H) 12/20/2023    CREATININE 1.65 (H) 12/20/2023    EGFR 32.9 (L) 12/20/2023    BCR 17.0 12/20/2023    K 6.1 (C) 12/20/2023    CO2 18.2 (L) 12/20/2023    CALCIUM 9.2 12/20/2023    ALBUMIN 4.0 12/20/2023    BILITOT 0.6 12/20/2023    AST 88 (H) 12/20/2023    ALT 61 (H) 12/20/2023      WBC   Date Value Ref Range Status   12/20/2023 5.96 3.40 - 10.80 10*3/mm3 Final     Hemoglobin   Date Value Ref Range Status  "  12/20/2023 13.9 12.0 - 15.9 g/dL Final     Hematocrit   Date Value Ref Range Status   12/20/2023 46.1 34.0 - 46.6 % Final     Platelets   Date Value Ref Range Status   12/20/2023 246 140 - 450 10*3/mm3 Final    No results found for: \"PSA\" No results found for: \"CEA\"     Assessment:  Stage IV renal carcinoma with osseous and pulmonary metastases-status post SBRT to enlarging right lower lobe nodule    The patient appears clinically and radiographically stable.    Recommendations:  Mrs. Berman appears to have had a satisfactory response to SBRT.  The right lower lobe nodule appears stable.  No post- radiation scarring around the mass is noted.  Imaging studies have shown the multiple lung nodules to be stable.  The metastatic focus in the right ninth rib is currently asymptomatic.    Mrs. Berman will see Dr. Graham in follow-up later this morning.  The patient will be discharged back to the care of the medical oncology service since she appears stable, has no post radiation therapy side effects to report, and she is being followed closely by Dr. Graham.  We would be pleased to see the patient again if requested.    Time spent with patient 30 minutes (the total time included previsit review of medical records, review of recent imaging studies with the patient and daughter, obtaining an updated history of present illness, performing an appropriate medical examination/evaluation, and patient counseling).    Thank you again for allowing our participation in Mrs. Berman's treatment.    Mc George MD  05/02/2024 09:42    cc:   MD Julee Laureano, ANGELA  "

## 2024-05-02 NOTE — PROGRESS NOTES
Venipuncture Blood Specimen Collection  Venipuncture performed in right arm by Zenaida Delong MA with good hemostasis. Patient tolerated the procedure well without complications.   05/02/24   Zenaida Delong MA

## 2024-06-03 DIAGNOSIS — C64.9 CARCINOMA OF KIDNEY, UNSPECIFIED LATERALITY: ICD-10-CM

## 2024-06-03 RX ORDER — OXYCODONE AND ACETAMINOPHEN 10; 325 MG/1; MG/1
1 TABLET ORAL EVERY 6 HOURS PRN
Qty: 120 TABLET | Refills: 0 | Status: SHIPPED | OUTPATIENT
Start: 2024-06-03

## 2024-06-03 NOTE — TELEPHONE ENCOUNTER
Caller: RodolfoAnnemarie    Relationship: Emergency Contact    Best call back number: [phone]     Requested Prescriptions:   Requested Prescriptions     Pending Prescriptions Disp Refills    oxyCODONE-acetaminophen (PERCOCET)  MG per tablet 120 tablet 0     Sig: Take 1 tablet by mouth Every 6 (Six) Hours As Needed for Moderate Pain.        Pharmacy where request should be sent: [facility] [place], [place] - [address] - [phone] [facility] [phone] FX     Last office visit with prescribing clinician: 5/2/2024   Last telemedicine visit with prescribing clinician: Visit date not found   Next office visit with prescribing clinician: 9/3/2024     Additional details provided by patient:     Does the patient have less than a 3 day supply:  [x] Yes  [] No    Would you like a call back once the refill request has been completed: [] Yes [x] No    If the office needs to give you a call back, can they leave a voicemail: [] Yes [x] No

## 2024-07-01 DIAGNOSIS — C64.9 CARCINOMA OF KIDNEY, UNSPECIFIED LATERALITY: ICD-10-CM

## 2024-07-01 RX ORDER — OXYCODONE AND ACETAMINOPHEN 10; 325 MG/1; MG/1
1 TABLET ORAL EVERY 6 HOURS PRN
Qty: 120 TABLET | Refills: 0 | Status: SHIPPED | OUTPATIENT
Start: 2024-07-01

## 2024-07-01 NOTE — TELEPHONE ENCOUNTER
Caller: RodolfoAnnemarie    Relationship: Emergency Contact    Best call back number: [phone]    Requested Prescriptions:   Requested Prescriptions     Pending Prescriptions Disp Refills    oxyCODONE-acetaminophen (PERCOCET)  MG per tablet 120 tablet 0     Sig: Take 1 tablet by mouth Every 6 (Six) Hours As Needed for Moderate Pain.        Pharmacy where request should be sent: [facility] [place], [place] - [address]  - [phone] [facility] [phone] FX     Last office visit with prescribing clinician: 5/2/2024   Last telemedicine visit with prescribing clinician: Visit date not found   Next office visit with prescribing clinician: 9/3/2024     Additional details provided by patient:     Does the patient have less than a 3 day supply:  [x] Yes  [] No    Would you like a call back once the refill request has been completed: [] Yes [x] No    If the office needs to give you a call back, can they leave a voicemail: [x] Yes [] No    Valente Kearns Rep   07/01/24 15:10 EDT

## 2024-08-02 DIAGNOSIS — C64.9 CARCINOMA OF KIDNEY, UNSPECIFIED LATERALITY: ICD-10-CM

## 2024-08-02 RX ORDER — OXYCODONE AND ACETAMINOPHEN 10; 325 MG/1; MG/1
1 TABLET ORAL EVERY 6 HOURS PRN
Qty: 120 TABLET | Refills: 0 | Status: SHIPPED | OUTPATIENT
Start: 2024-08-02

## 2024-08-02 NOTE — TELEPHONE ENCOUNTER
Caller: Rodolfo,Tia    Relationship: Emergency Contact    Best call back number: [phone]    Requested Prescriptions:   Requested Prescriptions     Pending Prescriptions Disp Refills    oxyCODONE-acetaminophen (PERCOCET)  MG per tablet 120 tablet 0     Sig: Take 1 tablet by mouth Every 6 (Six) Hours As Needed for Moderate Pain.        Pharmacy where request should be sent: [facility] [place], [place] - [address] - [phone] [facility] [phone] FX     Last office visit with prescribing clinician: 5/2/2024   Last telemedicine visit with prescribing clinician: Visit date not found   Next office visit with prescribing clinician: 9/3/2024       Does the patient have less than a 3 day supply:  [x] Yes  [] No        Valente Hicks Rep   08/02/24 11:45 EDT

## 2024-09-04 ENCOUNTER — HOSPITAL ENCOUNTER (OUTPATIENT)
Dept: CT IMAGING | Facility: HOSPITAL | Age: 74
Discharge: HOME | End: 2024-09-04
Payer: MEDICARE

## 2024-09-04 DIAGNOSIS — C64.9 CARCINOMA OF KIDNEY, UNSPECIFIED LATERALITY: ICD-10-CM

## 2024-09-04 DIAGNOSIS — C64.9 RENAL CELL CARCINOMA, UNSPECIFIED LATERALITY: ICD-10-CM

## 2024-09-04 DIAGNOSIS — R91.8 PULMONARY NODULES: ICD-10-CM

## 2024-09-04 PROCEDURE — 71250 CT THORAX DX C-: CPT

## 2024-09-04 PROCEDURE — 74176 CT ABD & PELVIS W/O CONTRAST: CPT

## 2024-09-04 RX ORDER — OXYCODONE AND ACETAMINOPHEN 10; 325 MG/1; MG/1
1 TABLET ORAL EVERY 6 HOURS PRN
Qty: 120 TABLET | Refills: 0 | Status: SHIPPED | OUTPATIENT
Start: 2024-09-04

## 2024-09-04 NOTE — TELEPHONE ENCOUNTER
Caller: RodolfoAnnemarie    Relationship: Emergency Contact    Best call back number: [phone]     Requested Prescriptions:   Requested Prescriptions     Pending Prescriptions Disp Refills    oxyCODONE-acetaminophen (PERCOCET)  MG per tablet 120 tablet 0     Sig: Take 1 tablet by mouth Every 6 (Six) Hours As Needed for Moderate Pain.        Pharmacy where request should be sent: [facility] [place], [place] - [address] - [phone] [facility] [phone] FX     Last office visit with prescribing clinician: 5/2/2024   Last telemedicine visit with prescribing clinician: Visit date not found   Next office visit with prescribing clinician: 9/9/2024       Does the patient have less than a 3 day supply:  [x] Yes  [] No    Would you like a call back once the refill request has been completed: [] Yes [x] No    If the office needs to give you a call back, can they leave a voicemail: [] Yes [x] No    Valente Sáncehz Rep   09/04/24 14:23 EDT

## 2024-09-12 ENCOUNTER — OFFICE VISIT (OUTPATIENT)
Dept: ONCOLOGY | Facility: CLINIC | Age: 74
End: 2024-09-12
Payer: MEDICARE

## 2024-09-12 ENCOUNTER — LAB (OUTPATIENT)
Dept: ONCOLOGY | Facility: CLINIC | Age: 74
End: 2024-09-12
Payer: MEDICARE

## 2024-09-12 VITALS
DIASTOLIC BLOOD PRESSURE: 47 MMHG | HEART RATE: 70 BPM | RESPIRATION RATE: 18 BRPM | TEMPERATURE: 97.3 F | SYSTOLIC BLOOD PRESSURE: 70 MMHG | BODY MASS INDEX: 29.29 KG/M2 | WEIGHT: 176 LBS | OXYGEN SATURATION: 97 %

## 2024-09-12 DIAGNOSIS — C64.9 CARCINOMA OF KIDNEY, UNSPECIFIED LATERALITY: Primary | ICD-10-CM

## 2024-09-12 DIAGNOSIS — R91.8 PULMONARY NODULES: ICD-10-CM

## 2024-09-12 DIAGNOSIS — C64.9 RENAL CELL CARCINOMA, UNSPECIFIED LATERALITY: ICD-10-CM

## 2024-09-12 DIAGNOSIS — C64.9 CARCINOMA OF KIDNEY, UNSPECIFIED LATERALITY: ICD-10-CM

## 2024-09-12 DIAGNOSIS — R52 PAIN: ICD-10-CM

## 2024-09-12 LAB
ALBUMIN SERPL-MCNC: 4 G/DL (ref 3.5–5.2)
ALBUMIN/GLOB SERPL: 0.9 G/DL
ALP SERPL-CCNC: 138 U/L (ref 39–117)
ALT SERPL W P-5'-P-CCNC: 6 U/L (ref 1–33)
ANION GAP SERPL CALCULATED.3IONS-SCNC: 11.5 MMOL/L (ref 5–15)
AST SERPL-CCNC: 47 U/L (ref 1–32)
BASOPHILS # BLD AUTO: 0.03 10*3/MM3 (ref 0–0.2)
BASOPHILS NFR BLD AUTO: 0.5 % (ref 0–1.5)
BILIRUB SERPL-MCNC: 0.6 MG/DL (ref 0–1.2)
BUN SERPL-MCNC: 29 MG/DL (ref 8–23)
BUN/CREAT SERPL: 14.4 (ref 7–25)
CALCIUM SPEC-SCNC: 9.3 MG/DL (ref 8.6–10.5)
CHLORIDE SERPL-SCNC: 105 MMOL/L (ref 98–107)
CO2 SERPL-SCNC: 18.5 MMOL/L (ref 22–29)
CREAT SERPL-MCNC: 2.02 MG/DL (ref 0.57–1)
DEPRECATED RDW RBC AUTO: 44.5 FL (ref 37–54)
EGFRCR SERPLBLD CKD-EPI 2021: 25.6 ML/MIN/1.73
EOSINOPHIL # BLD AUTO: 0.07 10*3/MM3 (ref 0–0.4)
EOSINOPHIL NFR BLD AUTO: 1.2 % (ref 0.3–6.2)
ERYTHROCYTE [DISTWIDTH] IN BLOOD BY AUTOMATED COUNT: 12.7 % (ref 12.3–15.4)
GLOBULIN UR ELPH-MCNC: 4.6 GM/DL
GLUCOSE SERPL-MCNC: 102 MG/DL (ref 65–99)
HCT VFR BLD AUTO: 39.6 % (ref 34–46.6)
HGB BLD-MCNC: 12.7 G/DL (ref 12–15.9)
IMM GRANULOCYTES # BLD AUTO: 0.02 10*3/MM3 (ref 0–0.05)
IMM GRANULOCYTES NFR BLD AUTO: 0.3 % (ref 0–0.5)
LYMPHOCYTES # BLD AUTO: 1.6 10*3/MM3 (ref 0.7–3.1)
LYMPHOCYTES NFR BLD AUTO: 27.5 % (ref 19.6–45.3)
MCH RBC QN AUTO: 30.8 PG (ref 26.6–33)
MCHC RBC AUTO-ENTMCNC: 32.1 G/DL (ref 31.5–35.7)
MCV RBC AUTO: 95.9 FL (ref 79–97)
MONOCYTES # BLD AUTO: 0.54 10*3/MM3 (ref 0.1–0.9)
MONOCYTES NFR BLD AUTO: 9.3 % (ref 5–12)
NEUTROPHILS NFR BLD AUTO: 3.55 10*3/MM3 (ref 1.7–7)
NEUTROPHILS NFR BLD AUTO: 61.2 % (ref 42.7–76)
NRBC BLD AUTO-RTO: 0 /100 WBC (ref 0–0.2)
PLATELET # BLD AUTO: 239 10*3/MM3 (ref 140–450)
PMV BLD AUTO: 10 FL (ref 6–12)
POTASSIUM SERPL-SCNC: 5.4 MMOL/L (ref 3.5–5.2)
PROT SERPL-MCNC: 8.6 G/DL (ref 6–8.5)
RBC # BLD AUTO: 4.13 10*6/MM3 (ref 3.77–5.28)
SODIUM SERPL-SCNC: 135 MMOL/L (ref 136–145)
WBC NRBC COR # BLD AUTO: 5.81 10*3/MM3 (ref 3.4–10.8)

## 2024-09-12 PROCEDURE — 3074F SYST BP LT 130 MM HG: CPT | Performed by: INTERNAL MEDICINE

## 2024-09-12 PROCEDURE — 36415 COLL VENOUS BLD VENIPUNCTURE: CPT | Performed by: INTERNAL MEDICINE

## 2024-09-12 PROCEDURE — 99214 OFFICE O/P EST MOD 30 MIN: CPT | Performed by: INTERNAL MEDICINE

## 2024-09-12 PROCEDURE — 1125F AMNT PAIN NOTED PAIN PRSNT: CPT | Performed by: INTERNAL MEDICINE

## 2024-09-12 PROCEDURE — G2211 COMPLEX E/M VISIT ADD ON: HCPCS | Performed by: INTERNAL MEDICINE

## 2024-09-12 PROCEDURE — 80053 COMPREHEN METABOLIC PANEL: CPT | Performed by: INTERNAL MEDICINE

## 2024-09-12 PROCEDURE — 3078F DIAST BP <80 MM HG: CPT | Performed by: INTERNAL MEDICINE

## 2024-09-12 PROCEDURE — 85025 COMPLETE CBC W/AUTO DIFF WBC: CPT | Performed by: INTERNAL MEDICINE

## 2024-09-12 RX ORDER — CLOPIDOGREL BISULFATE 75 MG/1
75 TABLET ORAL DAILY
COMMUNITY
Start: 2024-09-04

## 2024-09-12 RX ORDER — FAMOTIDINE 20 MG/1
20 TABLET, FILM COATED ORAL 2 TIMES DAILY
COMMUNITY
Start: 2024-07-01

## 2024-09-12 RX ORDER — AZITHROMYCIN 250 MG/1
TABLET, FILM COATED ORAL
Qty: 6 TABLET | Refills: 0 | Status: SHIPPED | OUTPATIENT
Start: 2024-09-12

## 2024-09-12 RX ORDER — GLIPIZIDE 10 MG/1
10 TABLET ORAL
COMMUNITY
Start: 2024-09-04

## 2024-09-12 RX ORDER — ATORVASTATIN CALCIUM 40 MG/1
40 TABLET, FILM COATED ORAL DAILY
COMMUNITY
Start: 2024-09-04

## 2024-09-12 NOTE — PROGRESS NOTES
"  Name:  Nancy Berman  :  1950  Date:  2024     REFERRING PROVIDER  ANGELA Clark    PRIMARY CARE PROVIDER  Julee Lerner APRN    REASON FOR FOLLOWUP  1. Carcinoma of kidney, unspecified laterality      CHIEF COMPLAINT  Chronic back pain, stable.    Dear Ms. Eduarda,    HISTORY OF PRESENT ILLNESS:   I saw Ms. Berman in follow up today in our medical oncology clinic. As you are aware, she is a pleasant, 74 y.o.,  female with a history of hypertension, diabetes and chronic kidney disease who was initially diagnosed with, and underwent an uncomplicated resection of, a left-sided kidney cancer in . Adjuvant therapy was not indicated. Ultimately, she had been doing very well from the standpoint of this disease ever since then (for the past ten years). However, in early summer 2018, as part of a routine follow up evaluation for her chronic kidney disease (of her remaining, right kidney), you ordered a renal ultrasound. Unexpectedly, this study identified a retroperitoneal mass; and subsequent CT scans (performed in late 2018) showed \"nodular foci in the retroperitoneum\", a \"heterogeneous lesion inferior in the right lobe of the liver\" and \"small, tiny peripheral densities in the lungs most noticeable on the right\", consistent with more widespread and metastatic disease. She underwent a CT-guided biopsy of a retroperitoneal mass at  on 2018, and the pathology results were consistent with recurrent, renal cell carcinoma ( urology ultimately wound up performing a palliative debulking surgery to resect this oligometastatic, retroperitoneal mass on 2019). She was subsequently referred to our clinic for further evaluation and management. For the most part, her disease has been very manageable with occasional, localized therapies (including 2019's surgery) alone ever since then.    INTERIM HISTORY:  Ms. Berman returns to clinic today for follow " up once again accompanied by her daughter. She has a history of longstanding back pain (which, for years, has had a tendency to worsen when she stands up to wash dishes and forces her to sit down periodically to rest) and fatigue. This symptom has recently been back under fairly good control after we increased the dose of her prn Percocet slightly (to 10 mg) q6hr in late 2023. She underwent a five fraction course of SBRT to an enlarging, right lower lobe lung nodule in early February; and she confirms today that she tolerated this therapy very well, without any noticeable side effects. She overall continues to feel exactly the same as she usually does, and she once again has no new or other specific complaints.    Past Medical History:   Diagnosis Date    Arthritis     Cancer     left Kidney    Chronic kidney disease, stage III (moderate)     Diabetes mellitus     High cholesterol     Hypertension     Low back pain     Spinal stenosis        Past Surgical History:   Procedure Laterality Date    BLADDER SURGERY      HYSTERECTOMY      KIDNEY SURGERY Left     Nephrectomy for cancerous mass    OK TX HUMRAL SHAFT FX W/INSJ IMED IMPLT W/W CERCLGE Left 4/20/2017    Procedure: LEFT HUMERUS INTRAMEDULLARY NAIL/BHAVANA INSERTION;  Surgeon: Jose Willard MD;  Location: [facility];  Service: Orthopedics       Social History     Socioeconomic History    Marital status: Legally    Tobacco Use    Smoking status: Never    Smokeless tobacco: Never   Substance and Sexual Activity    Alcohol use: Yes     Comment: social    Drug use: Defer    Sexual activity: Defer       Family History   Problem Relation Age of Onset    Parkinsonism Mother     Heart disease Father     Diabetes Father     Cancer Brother        Allergies   Allergen Reactions    Sulfa Antibiotics        Current Outpatient Medications   Medication Sig Dispense Refill    amLODIPine (NORVASC) 5 MG tablet Take 1 tablet by mouth Daily.      aspirin 325 MG tablet  Take 1 tablet by mouth Daily.      atenolol (TENORMIN) 50 MG tablet       atorvastatin (LIPITOR) 40 MG tablet Take 1 tablet by mouth Daily.      carbidopa-levodopa (SINEMET)  MG per tablet Take 1 tablet by mouth 3 (Three) Times a Day.      clopidogrel (PLAVIX) 75 MG tablet Take 1 tablet by mouth Daily.      famotidine (PEPCID) 20 MG tablet Take 1 tablet by mouth 2 (Two) Times a Day.      glipizide (GLUCOTROL) 10 MG tablet Take 1 tablet by mouth 2 (Two) Times a Day Before Meals.      metFORMIN XR (GLUCOPHAGE-XR) 500 MG 24 hr tablet Take 1 tablet by mouth Daily With Breakfast.      oxyCODONE-acetaminophen (PERCOCET)  MG per tablet Take 1 tablet by mouth Every 6 (Six) Hours As Needed for Moderate Pain. 120 tablet 0    azithromycin (ZITHROMAX) 250 MG tablet Take 2 tablets the first day, then 1 tablet daily for 4 days. 6 tablet 0    gabapentin (NEURONTIN) 600 MG tablet Take 1 tablet by mouth 3 (Three) Times a Day. (Patient not taking: Reported on 9/12/2024)      QUEtiapine (SEROquel) 25 MG tablet Take 1 tablet by mouth Every Night. (Patient not taking: Reported on 9/12/2024)       No current facility-administered medications for this visit.     REVIEW OF SYSTEMS  CONSTITUTIONAL:  No fever, chills or night sweats. Chronic fatigue, stable.  EYES:  No blurry vision, diplopia or other vision changes.  ENT:  No hearing loss, nosebleeds or sore throat.  CARDIOVASCULAR:  No palpitations, arrhythmia, syncopal episodes or edema.  PULMONARY:  No hemoptysis, wheezing, chronic cough or shortness of breath.  GASTROINTESTINAL:  No nausea or vomiting.  No constipation or diarrhea. No abdominal pain.  GENITOURINARY:  No hematuria or kidney stones.  MUSCULOSKELETAL:  As per the HPI above.  INTEGUMENTARY: No rashes or pruritus.  ENDOCRINE:  No excessive thirst or hot flashes.  HEMATOLOGIC:  No history of free bleeding, spontaneous bleeding or clotting.  IMMUNOLOGIC:  No allergies or frequent infections.  NEUROLOGIC: No  numbness, tingling, seizures or weakness.  PSYCHIATRIC:  Mild chronic anxiety, stable.    PHYSICAL EXAMINATION  BP (!) 70/47   Pulse 70   Temp 97.3 °F (36.3 °C) (Temporal)   Resp 18   Wt 79.8 kg (176 lb)   SpO2 97%   BMI 29.29 kg/m²     ECOG score: 1   GENERAL:  A well-developed, well-nourished, elderly, black female in no acute distress, sitting in a wheelchair.  HEENT:  Pupils equally round and reactive to light. Extraocular muscles intact.  CARDIOVASCULAR:  Regular rate and rhythm. No murmurs, gallops or rubs.  LUNGS:  Clear to auscultation bilaterally.  ABDOMEN:  Soft, nontender, nondistended with positive bowel sounds.  EXTREMITIES:  No clubbing, cyanosis or edema bilaterally.  SKIN:  No rashes or petechiae.  NEURO:  Cranial nerves grossly intact. No focal deficits. Mild, resting tremors, stable.  PSYCH:  Alert and oriented x3.    LABORATORY  Lab Results   Component Value Date    WBC 5.81 09/12/2024    HGB 12.7 09/12/2024    HCT 39.6 09/12/2024    MCV 95.9 09/12/2024     09/12/2024    NEUTROABS 3.55 09/12/2024       Lab Results   Component Value Date     (L) 05/02/2024    K 4.9 05/02/2024     05/02/2024    CO2 18.5 (L) 05/02/2024    BUN 21 05/02/2024    CREATININE 1.54 (H) 05/02/2024    GLUCOSE 193 (H) 05/02/2024    CALCIUM 8.8 05/02/2024    AST 27 05/02/2024    ALT 5 05/02/2024    ALKPHOS 153 (H) 05/02/2024    BILITOT 0.4 05/02/2024    PROTEINTOT 8.4 05/02/2024    ALBUMIN 3.9 05/02/2024     CBC (09/12/2024): WBCs: 5.81; HgB: 12.7; Hct: 39.6; platelets: 239  CBC (05/02/2024): WBCs: 5.54; HgB: 13.0; Hct: 41.6; platelets: 231  CBC (12/20/2023): WBCs: 5.96; HgB: 13.9; Hct: 46.1; platelets: 246  CBC (06/20/2023): WBCs: 8.89; HgB: 12.2; Hct: 39.1; platelets: 275  CBC (12/20/2022): WBCs: 7.0; HgB: 12.5; Hct: 43.5; platelets: 293  CBC (03/05/2021): WBCs: 4.4; HgB: 13.8; Hct: 43.8; platelets: 263  CBC (12/11/2020): WBCs: 7.2; HgB: 13.2; Hct: 41.0; platelets: 290  CBC (05/21/2020): WBCs: 6.9;  HgB: 14.2; Hct: 44.3; platelets: 282  CBC (10/22/2019): WBCs: 6.1; HgB: 12.8; Hct: 39.1; platelets: 318  CBC (06/14/2019): WBCs: 4.7; HgB: 12.7; Hct: 38.7; platelets: 252  CBC (03/05/2019): WBCs: 5.9; HgB: 13.6; Hct: 41.4; platelets: 264  CBC (11/20/2018): WBCs: 5.3; HgB: 13.0; Hct: 40.4; platelets: 243  CBC (08/23/2018): WBCs: 5.7; HgB: 13.8; Hct: 43.4; platelets: 248    IMAGING  CT chest, abdomen and pelvis with contrast (06/30/2018, at ):  Impression:  1) Small, tiny peripheral densities in the lungs most noticeable on the right.  2) Heterogeneous lesion inferior in the right lobe of the liver.  3) Heterogeneous enhancing nodular foci in the retroperitoneum.  4) Vascular calcifications with moderate coronary artery disease.  5) Moderate degenerative changes in the spine with moderate spinal canal stenosis.    CT chest without contrast (08/16/2018):  Impression: Negative CT scan of the thorax. Nothing is seen to suggest metastatic disease in the chest.    CT abdomen and pelvis without contrast (08/16/2018):  Impression: Patient status post right nephrectomy. There is a low density mass in the retroperitoneum that would be consistent with metastatic disease to the retroperitoneal lymph nodes possibly involving the inferior vena cava. It measures approximately 3.8 cm in diameter.    NM bone scan, whole body (09/27/2018):  Impression: There are no findings to suggest metastatic disease or to explain the patient's symptoms.    CT chest without contrast (11/20/2018):  Impression: Stable CT scan of the thorax. Nothing seen to suggest metastatic disease. There is fairly heavy coronary artery calcification. There were no pulmonary parenchymal lung nodules or pleural effusions. No enlarged lymph nodes were demonstrated.    CT abdomen and pelvis without contrast (11/20/2018):  Impression:  Patient status post right nephrectomy. There continues to be some abnormal soft tissue density in the retroperitoneum in the pericaval  area concerning for retroperitoneal lymphadenopathy. This is completely unchanged, however, from 08/2018 exam.    CT abdomen and pelvis without contrast (03/05/2019):  Impression: Stable appearance of the abdomen including soft tissue density mass in the retroperitoneal region. There continues to be some abnormal nearly 7 cm soft tissue density in the retroperitoneum in the pericaval area that remains stable.    CT chest, abdomen and pelvis with contrast (10/30/2019):  Impression: A noncalcified nodule right lower lobe on image 54 series 3 measures 7 x 6 mm and appears slightly larger, previously measuring 6 x 4 mm. No new nodules. Interval resection of retroperitoneal maxwell masses. Stable, lobulated mass in the lower right hepatic lobe.    CT chest without contrast (05/20/2020):  Impression:  1) No parenchymal soft tissue nodules or masses.  2) No pericardial or pleural effusions.  3) No adenopathy.    CT chest, abdomen and pelvis with contrast (08/05/2020, compared to 10/30/2019, at ):  Impression: No evidence of disease progression with unchanged 8 mm noncalcified right lower lobe pulmonary nodule. Unchanged, lobulated mass in the right hemiliver. No evidence of disease progression.    NM bone scan, whole body (12/30/2020):  Impression: No evidence of metastatic disease to the skeleton.    CT chest with contrast (02/26/2021):  Impression: At this time there are no CT findings to suggest metastatic disease in the chest from the patient's renal cell carcinoma.    CT abdomen and pelvis with contrast (02/26/2021, compared to 02/22/2019):  Impression: The patient has developed an area of abnormal density in the right lobe of the liver not seen on the previous CTs. It measures 28 x 12 mm. [Upon further review with radiology, while this area was not seen on the previous, noncontrasted CTs of the abdomen, it is visible (and stable) on an available MRI from back in 2009.] The soft tissue abnormality in the  retroperitoneum near the level of the renal vein is improved in comparing with earlier CTs. The right kidney is absent consistent with previous nephrectomy.    CT chest, abdomen and pelvis with contrast  (01/19/2022):  Impression:  1) Stable right lower lobe 8 mm pulmonary nodule unchanged dating back to 2019. No new pulmonary nodule or evidence of metastatic disease of the chest.  2) Stable appearance of lobulated right hemiliver lesion without interval change or evidence for progression in the abdomen and pelvis. Stable appearance of the right nephrectomy site.      CT abdomen and pelvis (01/17/2023, compared to 02/26/2021):  Impression:  1) Development of small pulmonary nodule in the right lower lobe and increased size of a pre-existing pulmonary nodule in the right lower lobe (now measures 10.9 mm) suspicious for metastatic disease.  2) No change in size of appearance of a hypodense right lobe liver lesion. No additional solid organ lesions identified.  3) No abdominal or pelvic adenopathy.  4) Right nephrectomy. Other nonacute/incidental findings.    CT chest without contrast (12/18/2023, compared to 02/26/2021):  Impression:  1) Size increase of previously described nodules of the right lower lobe and possible development of new tiny nodules in the right lung. 4.6 mm nodule right lower lobe, image #34. 14.0 x 14.1 mm nodule right lower lobe, image #43, was previously 10.9 x 12.7 mm indicating size increase. Tiny nodule in the more posterior basal segment of the right lower lobe is 3.3 mm and may be new or size increased from previous. 4.4 mm nodule right lower lobe was previously 3.4 mm and may be slightly increased in size. New pulmonary nodule in the right middle lobe is 4.9 mm.  2) No thoracic adenopathy.  3) Cardiomegaly and severe coronary artery calcifications.    CT abdomen and pelvis without contrast (12/18/2023, compared to 01/17/2023):  Impression:  1) Stable hypodense lesion right lobe liver which  is poorly defined.  2) No new solid organ lesions identified.  3) Stable changes of previous right nephrectomy.  4) No abdominal or pelvic adenopathy.  5) Refer to chest CT for pulmonary findings.  6) Advanced degenerative changes lumbar spine with multilevel stenosis. Other incidental and nonacute findings are stable from previous.    CT chest without contrast (04/26/2024, compared to 12/18/2023):  Impression:  1) Parenchymal nodules are stable since the previous exam.  2) Lytic focus in right ninth rib also stable.  3) No evidence of new metastatic disease.    CT chest without contrast (09/04/2024, compared to 04/26/2024):  Impression:  1) The previous evaluation showed a right lower lobe 1.3 cm pulmonary nodule. Today the same nodular region is identified but with worsened surrounding atelectasis or some spiculated airspace disease with now another nodule identified more anteriorly measuring 8 mm in the right upper lobe pulmonary nodule appearing stable at 9 mm.  2) Worsened pleural thickening at the right lung base with stable lytic appearance of the right ninth rib adjacent.    CT abdomen and pelvis without contrast (09/04/2024, compared to 12/18/2023):  Impression:  1) No lymphadenopathy or residual mass.  2) Scattered right lower lobe airspace changes noted from previous study where nodule was identified.    PATHOLOGY  Right kidney, radical nephrectomy (11/05/2008, per pathology report; outside slides):  Renal cell carcinoma, clear cell type. Unremarkable adrenal gland.    Right retroperitoneal mass (07/12/2018):  Positive for malignancy; consistent with recurrent renal cell carcinoma. PAX8 is strongly positive in tumor cells, and Inhibin is negative.    Retroperitoneal mass, resection (07/22/2019):  Metastatic renal cell carcinoma.    RADIATION THERAPY  Radiation Treatments       Active   No active radiation treatments to show.     Historical   Plans   RLL SBRT   Most recent treatment: Dose planned: 1,000  cGy (fraction 5 on 2/7/2024)   Total: Dose planned: 5,000 cGy (5 fractions)   Elapsed Days: 9      Reference Points   PTV   Most recent treatment: Dose given: -- (on 2/7/2024)   Total: Dose given: 5,000 cGy   Elapsed Days: 9                   IMPRESSION AND PLAN  Ms. Berman is a 74 y.o.,  female with:  Renal cell carcinoma: Initially diagnosed in 2008 and status post a left-sided nephrectomy, with no signs/symptoms of recurrent disease for the next ten years. Unfortunately, as of June 2018, this was/is no longer the case. CT scans performed at  on 06/30/2018 identified multiple foci in the retroperitoneum (along with possible lung lesions; although lung imaging since then has been less concerning), consistent with (and CT-guided FNA confirmed) recurrent, and now metastatic, disease. I have had multiple, long discussions with the patient and her daughters since the time of her initial presentation in our clinic (on 07/25/2018) regarding this diagnosis and, in general terms, its prognosis. In short, they remain aware that this disease is no longer classically curable; but that, particularly given her good performance status, it was (and remains) treatable. While there have always been multiple, systemic options available to us (including PO TKIs, such as pazopanib, and IV immunotherapy, such as qmonthly nivolumab), it has continued to be reasonable to defer starting any specific, systemic treatment to date. Renal cell carcinoma is typically slow growing, and this has been especially true in her case. She did not relapse for ten years, intermittent CT scans since the ones performed in late June 2018 at  have shown, and continue to show, an overall low, tumor burden; and she was (and remains) minimally (other than issue #3) symptomatic (and issue #3 likely has more to do with arthritis/degenerative disc disease than her cancer). In general, the longer an elderly patient with multiple comobidities  can avoid the potential side effects of palliative treatment, the better. Given her continued, very stable, oligometastatic disease, by early 2019 it seemed worthwhile to send her back to urology for reevaluation to consider resecting the retroperitoneal, soft tissue mass. This referral was made in Spring 2019, and following an evaluation at  (which included an MRI to make sure the mass did not appear to involve the great vessels),  urology ultimately took her to the OR on 07/22/2019. This procedure went extremely well. Today, nearly five (5) years later, she remains in an overall very good partial remission (see issue #2, below). Now that she has received a five fraction course of SBRT (in February 2024) to an enlarging, dominant nodule in the lower lobe of the right lung, the most recent repeat CT scans (including those performed on 04/26/2024 and, now, most recently, 09/04/2024, all summarized above) have shown, and continue to show, probable evolving, post-treatment related changes only, with no definite evidence of new or reprogressive disease. She will continue to follow up with  urology on an as needed basis. We will see her back in our clinic in three months (~early December) with a CBC, CMP and repeat CT of the chest without contrast. Due to issue #3, we will see her back in clinic in three, rather than six, months with a repeat CBC, CMP and CT of the chest without contrast.  Pulmonary nodules: While otherwise unremarkable, the repeat CT scans performed on 12/18/2023 (and summarized above) showed, for the first time in years, some signs of disease progression (within the lungs). While all of the noted lung nodules are not necessarily malignant, the very slow, but steady increase in the largest nodule (which, in the right lower lobe, had increased to ~1.4 cm in size), as well as the probable development of at least a few, new nodules and their locations in the bilateral lower lungs were/are all  suggestive of a late re-recurrence of issue #1. Biopsying one of these lesions would still be ideal to confirm the exact etiology (a second, unrelated malignancy, particularly lung cancer, is certainly still possible); however, with their distal locations and current sizes, at a minimum, a navigational bronchoscopy would likely still be required to do so. Simply presuming that all/most of these are related to issue #1 and proceeding with palliative immunotherapy also still seems premature, as she remains asymptomatic from the standpoint of these relatively small nodules, and immunotherapy is not necessarily side effect free (or guaranteed to work). Given all of this, following a long discussion in late 2023 regarding the potential risks vs. benefits of such therapy, the patient and her daughter were agreeable to our referring her to [facility] radiation oncology for SBRT to this dominant, enlarging nodule(s) in the right lower lobe; and she received a five fraction course in early February 2024. As discussed above, the most recent follow up chest CTs, performed on 04/26/2024 and, now, most recently, 09/04/2024 (both summarized above) remain consistent with evolving, post-therapeutic changes only, with no definite evidence of new or reprogressive disease.  Airspace disease: Although much of the findings in the right lower lobe on last week's repeat CT of the chest are likely related to evolving, post-therapeutic (she received a five-fraction course of SBRT to this region in February 2024) changes, this area has an infectious appearing quality to it as well. A Rx for a Z-pack was provided today; and we will see her back in clinic in three, rather than six, months with a repeat CT of the chest without contrast.  Back pain: A chronic issue which the retroperitoneal foci from issue #1 was likely exacerbating; but which, now that the mass has been excised, is likely still primarily due to longstanding arthritis. As her neck pain  has recently been a little worse, we further evaluated with a repeat NM bone scan; and this study, performed on 12/30/2020 (and summarized above) remained stable and completely unremarkable. With her diagnosis with issue #1, our clinic has agreed to provide her with her pain medicine Continue Percocet 10/325 mg q6hr prn. Continue to monitor.  The patient and her daughter were in agreement with these plans.    It is a pleasure to participate in Ms. Berman's care. Please do not hesitate to call with any questions or concerns that you may have.    A  total of 30 minutes were spent coordinating this patient’s care in clinic today; more than 50% of this time was face-to-face with the patient and her daughter, reviewing her interim medical history, discussing the results of last week's repeat CT of the chest and counseling on the current treatment and followup plan. All questions were answered to their satisfaction.    FOLLOW UP  Rx for a Z-pack (5-day course of azithromycin) provided today. Continue Percocet 10 mg PO q6hr prn. With [facility] radiation oncology prn, as planned. Return to our clinic in 3 months (~early December) with a CBC, CMP and repeat CT of the chest without contrast.            This document was electronically signed by GABY Graham MD September 12, 2024 16:23 EDT      CC: ANGELA Clark MD Stephen E. Strup, MD

## 2024-09-12 NOTE — PROGRESS NOTES
Venipuncture Blood Specimen Collection  Venipuncture performed in Left arm by Jenna Reynolds MA with good hemostasis. Patient tolerated the procedure well without complications.   09/12/24   Jenna Reynolds MA

## 2024-10-03 DIAGNOSIS — C64.9 CARCINOMA OF KIDNEY, UNSPECIFIED LATERALITY: ICD-10-CM

## 2024-10-03 RX ORDER — OXYCODONE AND ACETAMINOPHEN 10; 325 MG/1; MG/1
1 TABLET ORAL EVERY 6 HOURS PRN
Qty: 120 TABLET | Refills: 0 | Status: SHIPPED | OUTPATIENT
Start: 2024-10-03

## 2024-10-03 NOTE — TELEPHONE ENCOUNTER
Caller: RodolfoAnnemarie    Relationship: Emergency Contact    Best call back number: [phone]     Requested Prescriptions:   Requested Prescriptions     Pending Prescriptions Disp Refills    oxyCODONE-acetaminophen (PERCOCET)  MG per tablet 120 tablet 0     Sig: Take 1 tablet by mouth Every 6 (Six) Hours As Needed for Moderate Pain.        [facility] [place] [place] - [address]  - [phone] [facility] [phone] FX     Last office visit with prescribing clinician: 9/12/2024   Last telemedicine visit with prescribing clinician: Visit date not found   Next office visit with prescribing clinician: 12/12/2024     Additional details provided by patient: PT WILL BE OUT TOMORROW    Does the patient have less than a 3 day supply:  [x] Yes  [] No    Would you like a call back once the refill request has been completed: [] Yes [x] No    If the office needs to give you a call back, can they leave a voicemail: [] Yes [x] No    Valente Sánchez Rep   10/03/24 10:30 EDT

## 2024-11-04 DIAGNOSIS — C64.9 CARCINOMA OF KIDNEY, UNSPECIFIED LATERALITY: ICD-10-CM

## 2024-11-04 RX ORDER — OXYCODONE AND ACETAMINOPHEN 10; 325 MG/1; MG/1
1 TABLET ORAL EVERY 6 HOURS PRN
Qty: 120 TABLET | Refills: 0 | Status: SHIPPED | OUTPATIENT
Start: 2024-11-04

## 2024-12-03 DIAGNOSIS — C64.9 CARCINOMA OF KIDNEY, UNSPECIFIED LATERALITY: ICD-10-CM

## 2024-12-03 RX ORDER — OXYCODONE AND ACETAMINOPHEN 10; 325 MG/1; MG/1
1 TABLET ORAL EVERY 6 HOURS PRN
Qty: 120 TABLET | Refills: 0 | Status: SHIPPED | OUTPATIENT
Start: 2024-12-03

## 2024-12-03 NOTE — TELEPHONE ENCOUNTER
Caller: RodolfoAnnemarie    Relationship: Emergency Contact    Best call back number: [phone]    Requested Prescriptions:   Requested Prescriptions     Pending Prescriptions Disp Refills    oxyCODONE-acetaminophen (PERCOCET)  MG per tablet 120 tablet 0     Sig: Take 1 tablet by mouth Every 6 (Six) Hours As Needed for Moderate Pain.        Pharmacy where request should be sent: [facility] [place], [place] - [address] - [phone] [facility] [phone] FX     Last office visit with prescribing clinician: 9/12/2024   Last telemedicine visit with prescribing clinician: Visit date not found   Next office visit with prescribing clinician: 12/12/2024       Does the patient have less than a 3 day supply:  [x] Yes  [] No    Valente Hicks Rep   12/03/24 13:41 EST

## 2024-12-05 ENCOUNTER — HOSPITAL ENCOUNTER (OUTPATIENT)
Dept: CT IMAGING | Facility: HOSPITAL | Age: 74
Discharge: HOME | End: 2024-12-05
Admitting: INTERNAL MEDICINE
Payer: MEDICARE

## 2024-12-05 DIAGNOSIS — C64.9 RENAL CELL CARCINOMA, UNSPECIFIED LATERALITY: ICD-10-CM

## 2024-12-05 DIAGNOSIS — R52 PAIN: ICD-10-CM

## 2024-12-05 DIAGNOSIS — R91.8 PULMONARY NODULES: ICD-10-CM

## 2024-12-05 DIAGNOSIS — C64.9 CARCINOMA OF KIDNEY, UNSPECIFIED LATERALITY: ICD-10-CM

## 2024-12-05 PROCEDURE — 71250 CT THORAX DX C-: CPT

## 2025-01-03 DIAGNOSIS — C64.9 CARCINOMA OF KIDNEY, UNSPECIFIED LATERALITY: ICD-10-CM

## 2025-01-03 RX ORDER — OXYCODONE AND ACETAMINOPHEN 10; 325 MG/1; MG/1
1 TABLET ORAL EVERY 6 HOURS PRN
Qty: 120 TABLET | Refills: 0 | Status: SHIPPED | OUTPATIENT
Start: 2025-01-03

## 2025-01-03 NOTE — TELEPHONE ENCOUNTER
Caller: RodolfoAnnemarie    Relationship: Emergency Contact    Best call back number: [phone]    Requested Prescriptions:   Requested Prescriptions     Pending Prescriptions Disp Refills    oxyCODONE-acetaminophen (PERCOCET)  MG per tablet 120 tablet 0     Sig: Take 1 tablet by mouth Every 6 (Six) Hours As Needed for Moderate Pain.        Pharmacy where request should be sent: [facility] [place], [place] - [address] - [phone] [facility] [phone] FX     Last office visit with prescribing clinician: 9/12/2024   Last telemedicine visit with prescribing clinician: Visit date not found   Next office visit with prescribing clinician: 1/21/2025       Does the patient have less than a 3 day supply:  [x] Yes  [] No      Valente Hicks Rep   01/03/25 14:39 EST

## 2025-02-11 ENCOUNTER — TELEPHONE (OUTPATIENT)
Dept: ONCOLOGY | Facility: CLINIC | Age: 75
End: 2025-02-11

## 2025-02-11 NOTE — TELEPHONE ENCOUNTER
Caller: JAVON    Relationship to patient: ELSA KAPADIA     Best call back number: [phone]     Chief complaint: THEY WOULD LIKE TO GET THE PT SCHEDULED FOR A FOLLOW UP AFTER DOING HER LABS.     Type of visit: LAB AND FOLLOW UP     Requested date: ANY DATE AND TIME THEY JUST NEED AT LEAST 3 DAYS NOTICE TO ARRANGE FOR TRANS PORT.      If rescheduling, when is the original appointment: 01/21/25     Additional notes:THEY WILL FAX OVER THE LABS THAT THEY TOOK THE FACILITY FOR -[phone]

## 2025-02-18 ENCOUNTER — LAB (OUTPATIENT)
Dept: ONCOLOGY | Facility: CLINIC | Age: 75
End: 2025-02-18
Payer: MEDICARE

## 2025-02-18 ENCOUNTER — OFFICE VISIT (OUTPATIENT)
Dept: ONCOLOGY | Facility: CLINIC | Age: 75
End: 2025-02-18
Payer: MEDICARE

## 2025-02-18 VITALS
WEIGHT: 154 LBS | OXYGEN SATURATION: 97 % | SYSTOLIC BLOOD PRESSURE: 103 MMHG | HEIGHT: 65 IN | RESPIRATION RATE: 18 BRPM | TEMPERATURE: 97.3 F | BODY MASS INDEX: 25.66 KG/M2 | DIASTOLIC BLOOD PRESSURE: 69 MMHG | HEART RATE: 58 BPM

## 2025-02-18 DIAGNOSIS — C64.9 RENAL CELL CARCINOMA, UNSPECIFIED LATERALITY: ICD-10-CM

## 2025-02-18 DIAGNOSIS — C64.9 CARCINOMA OF KIDNEY, UNSPECIFIED LATERALITY: Primary | ICD-10-CM

## 2025-02-18 DIAGNOSIS — R91.8 PULMONARY NODULES: ICD-10-CM

## 2025-02-18 NOTE — PROGRESS NOTES
"  Name:  Nancy Berman  :  1950  Date:  2025     REFERRING PROVIDER  ANGELA Clark    PRIMARY CARE PROVIDER  Julee Lerner APRN    REASON FOR FOLLOWUP  1. Carcinoma of kidney, unspecified laterality      CHIEF COMPLAINT  Chronic back pain, stable.    Dear Ms. Eduarda,    HISTORY OF PRESENT ILLNESS:   I saw Ms. Berman in follow up today in our medical oncology clinic. As you are aware, she is a pleasant, 75 y.o.,  female with a history of hypertension, diabetes and chronic kidney disease who was initially diagnosed with, and underwent an uncomplicated resection of, a left-sided kidney cancer in . Adjuvant therapy was not indicated. Ultimately, she had been doing very well from the standpoint of this disease ever since then (for the past ten years). However, in early summer 2018, as part of a routine follow up evaluation for her chronic kidney disease (of her remaining, right kidney), you ordered a renal ultrasound. Unexpectedly, this study identified a retroperitoneal mass; and subsequent CT scans (performed in late 2018) showed \"nodular foci in the retroperitoneum\", a \"heterogeneous lesion inferior in the right lobe of the liver\" and \"small, tiny peripheral densities in the lungs most noticeable on the right\", consistent with more widespread and metastatic disease. She underwent a CT-guided biopsy of a retroperitoneal mass at  on 2018, and the pathology results were consistent with recurrent, renal cell carcinoma ( urology ultimately wound up performing a palliative debulking surgery to resect this oligometastatic, retroperitoneal mass on 2019). She was subsequently referred to our clinic for further evaluation and management. For the most part, her disease has been very manageable with occasional, localized therapies (including 2019's surgery) alone ever since then.    INTERIM HISTORY:  Ms. Berman returns to clinic today for follow " up yet again accompanied by her daughter. She has a history of longstanding back pain (which, for years, has had a tendency to worsen when she stands up to wash dishes and forces her to sit down periodically to rest) and fatigue. This symptom has recently been back under fairly good control after we increased the dose of her prn Percocet slightly (to 10 mg) q6hr in late 2023. She underwent a five fraction course of SBRT to an enlarging, right lower lobe lung nodule in early February; and she confirms today that she tolerated this therapy very well, without any noticeable side effects. She overall continues to feel the same as she usually does, and she once again has no new or other specific complaints.    Past Medical History:   Diagnosis Date    Arthritis     Cancer     left Kidney    Chronic kidney disease, stage III (moderate)     Diabetes mellitus     High cholesterol     Hypertension     Low back pain     Spinal stenosis        Past Surgical History:   Procedure Laterality Date    BLADDER SURGERY      HYSTERECTOMY      KIDNEY SURGERY Left     Nephrectomy for cancerous mass    MD TX HUMRAL SHAFT FX W/INSJ IMED IMPLT W/W CERCLGE Left 4/20/2017    Procedure: LEFT HUMERUS INTRAMEDULLARY NAIL/BHAVANA INSERTION;  Surgeon: Jose Willard MD;  Location: [facility];  Service: Orthopedics       Social History     Socioeconomic History    Marital status: Legally    Tobacco Use    Smoking status: Never    Smokeless tobacco: Never   Substance and Sexual Activity    Alcohol use: Yes     Comment: social    Drug use: Defer    Sexual activity: Defer       Family History   Problem Relation Age of Onset    Parkinsonism Mother     Heart disease Father     Diabetes Father     Cancer Brother        Allergies   Allergen Reactions    Sulfa Antibiotics        Current Outpatient Medications   Medication Sig Dispense Refill    amLODIPine (NORVASC) 5 MG tablet Take 1 tablet by mouth Daily.      aspirin 325 MG tablet Take 1  tablet by mouth Daily.      atenolol (TENORMIN) 50 MG tablet       atorvastatin (LIPITOR) 40 MG tablet Take 1 tablet by mouth Daily.      azithromycin (ZITHROMAX) 250 MG tablet Take 2 tablets the first day, then 1 tablet daily for 4 days. 6 tablet 0    carbidopa-levodopa (SINEMET)  MG per tablet Take 1 tablet by mouth 3 (Three) Times a Day.      clopidogrel (PLAVIX) 75 MG tablet Take 1 tablet by mouth Daily.      famotidine (PEPCID) 20 MG tablet Take 1 tablet by mouth 2 (Two) Times a Day.      glipizide (GLUCOTROL) 10 MG tablet Take 1 tablet by mouth 2 (Two) Times a Day Before Meals.      metFORMIN XR (GLUCOPHAGE-XR) 500 MG 24 hr tablet Take 1 tablet by mouth Daily With Breakfast.      oxyCODONE-acetaminophen (PERCOCET)  MG per tablet Take 1 tablet by mouth Every 6 (Six) Hours As Needed for Moderate Pain. 120 tablet 0    gabapentin (NEURONTIN) 600 MG tablet Take 1 tablet by mouth 3 (Three) Times a Day. (Patient not taking: Reported on 9/12/2024)      QUEtiapine (SEROquel) 25 MG tablet Take 1 tablet by mouth Every Night. (Patient not taking: Reported on 2/18/2025)       No current facility-administered medications for this visit.     REVIEW OF SYSTEMS  CONSTITUTIONAL:  No fever, chills or night sweats. Chronic fatigue, stable.  EYES:  No blurry vision, diplopia or other vision changes.  ENT:  No hearing loss, nosebleeds or sore throat.  CARDIOVASCULAR:  No palpitations, arrhythmia, syncopal episodes or edema.  PULMONARY:  No hemoptysis, wheezing, chronic cough or shortness of breath.  GASTROINTESTINAL:  No nausea or vomiting.  No constipation or diarrhea. No abdominal pain.  GENITOURINARY:  No hematuria or kidney stones.  MUSCULOSKELETAL:  As per the HPI above.  INTEGUMENTARY: No rashes or pruritus.  ENDOCRINE:  No excessive thirst or hot flashes.  HEMATOLOGIC:  No history of free bleeding, spontaneous bleeding or clotting.  IMMUNOLOGIC:  No allergies or frequent infections.  NEUROLOGIC: No numbness,  "tingling, seizures or weakness.  PSYCHIATRIC:  Mild chronic anxiety, stable.    PHYSICAL EXAMINATION  /69   Pulse 58   Temp 97.3 °F (36.3 °C) (Temporal)   Resp 18   Ht 165.1 cm (65\")   Wt 69.9 kg (154 lb)   SpO2 97%   BMI 25.63 kg/m²     ECOG score: 1   GENERAL:  A well-developed, well-nourished, elderly, black female in no acute distress, again sitting in a wheelchair.  HEENT:  Pupils equally round and reactive to light. Extraocular muscles intact.  CARDIOVASCULAR:  Regular rate and rhythm. No murmurs, gallops or rubs.  LUNGS:  Clear to auscultation bilaterally.  ABDOMEN:  Soft, nontender, nondistended with positive bowel sounds.  EXTREMITIES:  No clubbing, cyanosis or edema bilaterally.  SKIN:  No rashes or petechiae.  NEURO:  Cranial nerves grossly intact. No focal deficits. Mild, resting tremors, stable.  PSYCH:  Alert and oriented x3.    LABORATORY  Lab Results   Component Value Date    WBC 5.81 09/12/2024    HGB 12.7 09/12/2024    HCT 39.6 09/12/2024    MCV 95.9 09/12/2024     09/12/2024    NEUTROABS 3.55 09/12/2024       Lab Results   Component Value Date     (L) 09/12/2024    K 5.4 (H) 09/12/2024     09/12/2024    CO2 18.5 (L) 09/12/2024    BUN 29 (H) 09/12/2024    CREATININE 2.02 (H) 09/12/2024    GLUCOSE 102 (H) 09/12/2024    CALCIUM 9.3 09/12/2024    AST 47 (H) 09/12/2024    ALT 6 09/12/2024    ALKPHOS 138 (H) 09/12/2024    BILITOT 0.6 09/12/2024    PROTEINTOT 8.6 (H) 09/12/2024    ALBUMIN 4.0 09/12/2024     CBC (09/12/2024): WBCs: 5.81; HgB: 12.7; Hct: 39.6; platelets: 239  CBC (05/02/2024): WBCs: 5.54; HgB: 13.0; Hct: 41.6; platelets: 231  CBC (12/20/2023): WBCs: 5.96; HgB: 13.9; Hct: 46.1; platelets: 246  CBC (06/20/2023): WBCs: 8.89; HgB: 12.2; Hct: 39.1; platelets: 275  CBC (12/20/2022): WBCs: 7.0; HgB: 12.5; Hct: 43.5; platelets: 293  CBC (03/05/2021): WBCs: 4.4; HgB: 13.8; Hct: 43.8; platelets: 263  CBC (12/11/2020): WBCs: 7.2; HgB: 13.2; Hct: 41.0; platelets: " 290  CBC (05/21/2020): WBCs: 6.9; HgB: 14.2; Hct: 44.3; platelets: 282  CBC (10/22/2019): WBCs: 6.1; HgB: 12.8; Hct: 39.1; platelets: 318  CBC (06/14/2019): WBCs: 4.7; HgB: 12.7; Hct: 38.7; platelets: 252  CBC (03/05/2019): WBCs: 5.9; HgB: 13.6; Hct: 41.4; platelets: 264  CBC (11/20/2018): WBCs: 5.3; HgB: 13.0; Hct: 40.4; platelets: 243  CBC (08/23/2018): WBCs: 5.7; HgB: 13.8; Hct: 43.4; platelets: 248    IMAGING  CT chest, abdomen and pelvis with contrast (06/30/2018, at ):  Impression:  1) Small, tiny peripheral densities in the lungs most noticeable on the right.  2) Heterogeneous lesion inferior in the right lobe of the liver.  3) Heterogeneous enhancing nodular foci in the retroperitoneum.  4) Vascular calcifications with moderate coronary artery disease.  5) Moderate degenerative changes in the spine with moderate spinal canal stenosis.    CT chest without contrast (08/16/2018):  Impression: Negative CT scan of the thorax. Nothing is seen to suggest metastatic disease in the chest.    CT abdomen and pelvis without contrast (08/16/2018):  Impression: Patient status post right nephrectomy. There is a low density mass in the retroperitoneum that would be consistent with metastatic disease to the retroperitoneal lymph nodes possibly involving the inferior vena cava. It measures approximately 3.8 cm in diameter.    NM bone scan, whole body (09/27/2018):  Impression: There are no findings to suggest metastatic disease or to explain the patient's symptoms.    CT chest without contrast (11/20/2018):  Impression: Stable CT scan of the thorax. Nothing seen to suggest metastatic disease. There is fairly heavy coronary artery calcification. There were no pulmonary parenchymal lung nodules or pleural effusions. No enlarged lymph nodes were demonstrated.    CT abdomen and pelvis without contrast (11/20/2018):  Impression:  Patient status post right nephrectomy. There continues to be some abnormal soft tissue density in  the retroperitoneum in the pericaval area concerning for retroperitoneal lymphadenopathy. This is completely unchanged, however, from 08/2018 exam.    CT abdomen and pelvis without contrast (03/05/2019):  Impression: Stable appearance of the abdomen including soft tissue density mass in the retroperitoneal region. There continues to be some abnormal nearly 7 cm soft tissue density in the retroperitoneum in the pericaval area that remains stable.    CT chest, abdomen and pelvis with contrast (10/30/2019):  Impression: A noncalcified nodule right lower lobe on image 54 series 3 measures 7 x 6 mm and appears slightly larger, previously measuring 6 x 4 mm. No new nodules. Interval resection of retroperitoneal maxwell masses. Stable, lobulated mass in the lower right hepatic lobe.    CT chest without contrast (05/20/2020):  Impression:  1) No parenchymal soft tissue nodules or masses.  2) No pericardial or pleural effusions.  3) No adenopathy.    CT chest, abdomen and pelvis with contrast (08/05/2020, compared to 10/30/2019, at ):  Impression: No evidence of disease progression with unchanged 8 mm noncalcified right lower lobe pulmonary nodule. Unchanged, lobulated mass in the right hemiliver. No evidence of disease progression.    NM bone scan, whole body (12/30/2020):  Impression: No evidence of metastatic disease to the skeleton.    CT chest with contrast (02/26/2021):  Impression: At this time there are no CT findings to suggest metastatic disease in the chest from the patient's renal cell carcinoma.    CT abdomen and pelvis with contrast (02/26/2021, compared to 02/22/2019):  Impression: The patient has developed an area of abnormal density in the right lobe of the liver not seen on the previous CTs. It measures 28 x 12 mm. [Upon further review with radiology, while this area was not seen on the previous, noncontrasted CTs of the abdomen, it is visible (and stable) on an available MRI from back in 2009.] The soft  tissue abnormality in the retroperitoneum near the level of the renal vein is improved in comparing with earlier CTs. The right kidney is absent consistent with previous nephrectomy.    CT chest, abdomen and pelvis with contrast  (01/19/2022):  Impression:  1) Stable right lower lobe 8 mm pulmonary nodule unchanged dating back to 2019. No new pulmonary nodule or evidence of metastatic disease of the chest.  2) Stable appearance of lobulated right hemiliver lesion without interval change or evidence for progression in the abdomen and pelvis. Stable appearance of the right nephrectomy site.      CT abdomen and pelvis (01/17/2023, compared to 02/26/2021):  Impression:  1) Development of small pulmonary nodule in the right lower lobe and increased size of a pre-existing pulmonary nodule in the right lower lobe (now measures 10.9 mm) suspicious for metastatic disease.  2) No change in size of appearance of a hypodense right lobe liver lesion. No additional solid organ lesions identified.  3) No abdominal or pelvic adenopathy.  4) Right nephrectomy. Other nonacute/incidental findings.    CT chest without contrast (12/18/2023, compared to 02/26/2021):  Impression:  1) Size increase of previously described nodules of the right lower lobe and possible development of new tiny nodules in the right lung. 4.6 mm nodule right lower lobe, image #34. 14.0 x 14.1 mm nodule right lower lobe, image #43, was previously 10.9 x 12.7 mm indicating size increase. Tiny nodule in the more posterior basal segment of the right lower lobe is 3.3 mm and may be new or size increased from previous. 4.4 mm nodule right lower lobe was previously 3.4 mm and may be slightly increased in size. New pulmonary nodule in the right middle lobe is 4.9 mm.  2) No thoracic adenopathy.  3) Cardiomegaly and severe coronary artery calcifications.    CT abdomen and pelvis without contrast (12/18/2023, compared to 01/17/2023):  Impression:  1) Stable hypodense  lesion right lobe liver which is poorly defined.  2) No new solid organ lesions identified.  3) Stable changes of previous right nephrectomy.  4) No abdominal or pelvic adenopathy.  5) Refer to chest CT for pulmonary findings.  6) Advanced degenerative changes lumbar spine with multilevel stenosis. Other incidental and nonacute findings are stable from previous.    CT chest without contrast (04/26/2024, compared to 12/18/2023):  Impression:  1) Parenchymal nodules are stable since the previous exam.  2) Lytic focus in right ninth rib also stable.  3) No evidence of new metastatic disease.    CT chest without contrast (09/04/2024, compared to 04/26/2024):  Impression:  1) The previous evaluation showed a right lower lobe 1.3 cm pulmonary nodule. Today the same nodular region is identified but with worsened surrounding atelectasis or some spiculated airspace disease with now another nodule identified more anteriorly measuring 8 mm in the right upper lobe pulmonary nodule appearing stable at 9 mm.  2) Worsened pleural thickening at the right lung base with stable lytic appearance of the right ninth rib adjacent.    CT abdomen and pelvis without contrast (09/04/2024, compared to 12/18/2023):  Impression:  1) No lymphadenopathy or residual mass.  2) Scattered right lower lobe airspace changes noted from previous study where nodule was identified.    CT chest without contrast (12/05/2024, compared to 09/04/2024):  Impression:  1) Previously identified nodules unchanged.  2) Pleural thickening at the right lung base with lytic appearance of the right posterior ninth rib also unchanged.    PATHOLOGY  Right kidney, radical nephrectomy (11/05/2008, per pathology report; outside slides):  Renal cell carcinoma, clear cell type. Unremarkable adrenal gland.    Right retroperitoneal mass (07/12/2018):  Positive for malignancy; consistent with recurrent renal cell carcinoma. PAX8 is strongly positive in tumor cells, and Inhibin is  negative.    Retroperitoneal mass, resection (07/22/2019):  Metastatic renal cell carcinoma.    RADIATION THERAPY  Radiation Treatments       Active   No active radiation treatments to show.     Historical   Plans   RLL SBRT   Most recent treatment: Dose planned: 1,000 cGy (fraction 5 on 2/7/2024)   Total: Dose planned: 5,000 cGy (5 fractions)   Elapsed Days: 9      Reference Points   PTV   Most recent treatment: Dose given: -- (on 2/7/2024)   Total: Dose given: 5,000 cGy   Elapsed Days: 9                   IMPRESSION AND PLAN  Ms. Berman is a 75 y.o.,  female with:  Renal cell carcinoma: Initially diagnosed in 2008 and status post a left-sided nephrectomy, with no signs/symptoms of recurrent disease for the next ten years. Unfortunately, as of June 2018, this was/is no longer the case. CT scans performed at  on 06/30/2018 identified multiple foci in the retroperitoneum (along with possible lung lesions; although lung imaging since then has been less concerning), consistent with (and CT-guided FNA confirmed) recurrent, and now metastatic, disease. I have had multiple, long discussions with the patient and her daughters since the time of her initial presentation in our clinic (on 07/25/2018) regarding this diagnosis and, in general terms, its prognosis. In short, they remain aware that this disease is no longer classically curable; but that, particularly given her good performance status, it was (and remains) treatable. While there have always been multiple, systemic options available to us (including PO TKIs, such as pazopanib, and IV immunotherapy, such as qmonthly nivolumab), it has continued to be reasonable to defer starting any specific, systemic treatment to date. Renal cell carcinoma is typically slow growing, and this has been especially true in her case. She did not relapse for ten years, intermittent CT scans since the ones performed in late June 2018 at  have shown, and continue to  show, an overall low, tumor burden; and she was (and remains) minimally (other than issue #3) symptomatic (and issue #3 likely has more to do with arthritis/degenerative disc disease than her cancer). In general, the longer an elderly patient with multiple comobidities can avoid the potential side effects of palliative treatment, the better. Given her continued, very stable, oligometastatic disease, by early 2019 it seemed worthwhile to send her back to urology for reevaluation to consider resecting the retroperitoneal, soft tissue mass. This referral was made in Spring 2019, and following an evaluation at  (which included an MRI to make sure the mass did not appear to involve the great vessels),  urology ultimately took her to the OR on 07/22/2019. This procedure went extremely well. Today, nearly five (5) years later, she remains in an overall very good partial remission (see issue #2, below). Now that she has received a five fraction course of SBRT (in February 2024) to an enlarging, dominant nodule in the lower lobe of the right lung, the most recent repeat CT scans (including those performed on 04/26/2024, 09/04/2024 and, now, most recently, 12/05/2024, all summarized above) have shown, and continue to show, probable evolving, post-treatment related changes only, with no definite evidence of new or reprogressive disease. She will continue to follow up with  urology on an as needed basis. We will see her back in our clinic in four months (~mid-June) with a CBC, CMP and repeat CTs of the chest, abdomen and pelvis without contrast.  Pulmonary nodules: While otherwise unremarkable, the repeat CT scans performed on 12/18/2023 (and summarized above) showed, for the first time in years, some signs of disease progression (within the lungs). While all of the noted lung nodules are not necessarily malignant, the very slow, but steady increase in the largest nodule (which, in the right lower lobe, had increased to  ~1.4 cm in size), as well as the probable development of at least a few, new nodules and their locations in the bilateral lower lungs were/are all suggestive of a late re-recurrence of issue #1. Biopsying one of these lesions would still be ideal to confirm the exact etiology (a second, unrelated malignancy, particularly lung cancer, is certainly still possible); however, with their distal locations and current sizes, at a minimum, a navigational bronchoscopy would likely still be required to do so. Simply presuming that all/most of these are related to issue #1 and proceeding with palliative immunotherapy also still seems premature, as she remains asymptomatic from the standpoint of these relatively small nodules, and immunotherapy is not necessarily side effect free (or guaranteed to work). Given all of this, following a long discussion in late 2023 regarding the potential risks vs. benefits of such therapy, the patient and her daughter were agreeable to our referring her to [facility] radiation oncology for SBRT to this dominant, enlarging nodule(s) in the right lower lobe; and she received a five fraction course in early February 2024. As discussed above, the most recent follow up chest CTs, performed on 04/26/2024, 09/04/2024 and, now, most recently, 12/05/2024 (all summarized above) have been, and remain, consistent with evolving, post-therapeutic changes only, with no definite evidence of new or reprogressive disease.  Back pain: A chronic issue which the retroperitoneal foci from issue #1 was likely exacerbating; but which, now that the mass has been excised, is likely still primarily due to longstanding arthritis. As her neck pain has recently been a little worse, we further evaluated with a repeat NM bone scan; and this study, performed on 12/30/2020 (and summarized above) remained stable and completely unremarkable. With her diagnosis with issue #1, our clinic has agreed to provide her with her pain medicine.  Continue Percocet 10/325 mg q6hr prn. Continue to monitor.  The patient and her daughter were in agreement with these plans.    It is a pleasure to participate in Ms. Berman's care. Please do not hesitate to call with any questions or concerns that you may have.    A  total of 30 minutes were spent coordinating this patient’s care in clinic today; more than 50% of this time was face-to-face with the patient and her daughter, reviewing her interim medical history, discussing the results of December's repeat CT of the chest and counseling on the current followup plan. All questions were answered to their satisfaction.    FOLLOW UP  Continue Percocet 10 mg PO q6hr prn. With [facility] radiation oncology prn, as planned. Return to our clinic in 4 months (~mid-June) with a CBC, CMP and repeat CTs of the chest, abdomen and pelvis without contrast.          This document was electronically signed by GABY Graham MD February 18, 2025 14:26 EST      CC: ANGELA Clark MD Stephen E. Strup, MD

## 2025-02-18 NOTE — PROGRESS NOTES
Venipuncture Blood Specimen Collection  Venipuncture performed in left arm by Daphnie Santizo MA with good hemostasis. Patient tolerated the procedure well without complications.   02/18/25   Daphnie Santizo MA

## (undated) DEVICE — HOLDER: Brand: DEROYAL

## (undated) DEVICE — ENCORE® LATEX MICRO SIZE 7.5, STERILE LATEX POWDER-FREE SURGICAL GLOVE: Brand: ENCORE

## (undated) DEVICE — CUSTOM PACK: Brand: UNBRANDED

## (undated) DEVICE — BLD CLIP UNIV SURG GRY

## (undated) DEVICE — IMPLANTABLE DEVICE
Type: IMPLANTABLE DEVICE | Site: HUMERUS | Status: NON-FUNCTIONAL
Removed: 2017-04-20

## (undated) DEVICE — BIT DRL 3FLUT QC NDL PT 3.2X145MM

## (undated) DEVICE — KWIRE TROC/TP 2.5X285MM NS
Type: IMPLANTABLE DEVICE | Status: NON-FUNCTIONAL
Removed: 2017-04-20

## (undated) DEVICE — DRP C/ARM W/BAND W/CLIPS 41X74IN

## (undated) DEVICE — DRSNG WND STRIP OPTIFOAM AG A/MIC LF 3.5X6IN STRL

## (undated) DEVICE — 4-PORT MANIFOLD: Brand: NEPTUNE 2

## (undated) DEVICE — PROXIMATE RH ROTATING HEAD SKIN STAPLERS (35 WIDE) CONTAINS 35 STAINLESS STEEL STAPLES: Brand: PROXIMATE

## (undated) DEVICE — PK SHLDR 70

## (undated) DEVICE — SHOULDER IMMOBILIZER: Brand: DEROYAL

## (undated) DEVICE — DRSNG WND OPTIFOAM FEN LF 3X3IN STRL